# Patient Record
Sex: FEMALE | Race: WHITE | Employment: OTHER | URBAN - METROPOLITAN AREA
[De-identification: names, ages, dates, MRNs, and addresses within clinical notes are randomized per-mention and may not be internally consistent; named-entity substitution may affect disease eponyms.]

---

## 2017-01-03 ENCOUNTER — HOSPITAL ENCOUNTER (OUTPATIENT)
Dept: LAB | Age: 62
Discharge: HOME | End: 2017-01-03

## 2017-01-03 ENCOUNTER — HOSPITAL ENCOUNTER (OUTPATIENT)
Dept: CT IMAGING | Age: 62
Discharge: HOME | End: 2017-01-03
Attending: FAMILY MEDICINE
Payer: COMMERCIAL

## 2017-01-03 DIAGNOSIS — R05.9 COUGH: ICD-10-CM

## 2017-01-03 DIAGNOSIS — R06.00 DYSPNEA: ICD-10-CM

## 2017-01-03 DIAGNOSIS — I11.9 BENIGN HYPERTENSIVE HEART DISEASE WITHOUT HEART FAILURE: ICD-10-CM

## 2017-01-03 PROCEDURE — 71270 CT THORAX DX C-/C+: CPT

## 2017-01-03 PROCEDURE — 71260 CT THORAX DX C+: CPT

## 2017-01-03 PROCEDURE — 74011636320 HC RX REV CODE- 636/320: Performed by: FAMILY MEDICINE

## 2017-01-03 PROCEDURE — 99001 SPECIMEN HANDLING PT-LAB: CPT | Performed by: INTERNAL MEDICINE

## 2017-01-03 RX ADMIN — IOPAMIDOL 80 ML: 612 INJECTION, SOLUTION INTRAVENOUS at 07:47

## 2017-01-04 ENCOUNTER — CLINICAL SUPPORT (OUTPATIENT)
Dept: CARDIOLOGY CLINIC | Age: 62
End: 2017-01-04

## 2017-01-04 VITALS
BODY MASS INDEX: 37.74 KG/M2 | HEIGHT: 63 IN | OXYGEN SATURATION: 96 % | HEART RATE: 84 BPM | WEIGHT: 213 LBS | DIASTOLIC BLOOD PRESSURE: 73 MMHG | SYSTOLIC BLOOD PRESSURE: 129 MMHG

## 2017-01-04 DIAGNOSIS — I10 ESSENTIAL HYPERTENSION: Primary | ICD-10-CM

## 2017-01-04 NOTE — PROGRESS NOTES
Roge Nelson is a 59 y.o. female that is here for a blood pressure check. Her current medications are listed below. Current Outpatient Prescriptions   Medication Sig    lisinopril (PRINIVIL, ZESTRIL) 10 mg tablet Take 1 Tab by mouth daily.  hydrochlorothiazide (HYDRODIURIL) 25 mg tablet Take 1 Tab by mouth daily.  ATORVASTATIN CALCIUM (LIPITOR PO) Take 10 mg by mouth daily.  aspirin 81 mg chewable tablet Take 1 Tab by mouth daily.  buPROPion XL (WELLBUTRIN XL) 150 mg tablet Take 150 mg by mouth every morning.  CALCIUM PO Take 1,200 mg by mouth daily.  multivitamin, stress formula (STRESS TAB) tablet Take 1 Tab by mouth daily.  cholecalciferol (VITAMIN D3) 1,000 unit tablet Take  by mouth daily.  omega-3 fatty acids-vitamin e (FISH OIL) 1,000 mg cap Take 1 Cap by mouth.  Desvenlafaxine SR (PRISTIQ) 50 mg tablet Take 50 mg by mouth daily. Indications: GENERALIZED ANXIETY DISORDER     No current facility-administered medications for this visit. Her   Visit Vitals    /73    Pulse 84    Ht 5' 3\" (1.6 m)    Wt 213 lb (96.6 kg)    SpO2 96%    BMI 37.73 kg/m2       She took her lisinopril this morning at 7:30. She has no complaints and is doing well. She had her labs yesterday and they were reviewed. All within normal limits. Advised to continue same, no changes at this time per Dr. Argenis Garnica. Patient verbalized understanding.

## 2017-01-11 ENCOUNTER — CLINICAL SUPPORT (OUTPATIENT)
Dept: CARDIOLOGY CLINIC | Age: 62
End: 2017-01-11

## 2017-01-11 DIAGNOSIS — R00.1 SYMPTOMATIC BRADYCARDIA: Primary | ICD-10-CM

## 2017-01-11 DIAGNOSIS — Z95.0 CARDIAC PACEMAKER IN SITU: ICD-10-CM

## 2017-02-06 ENCOUNTER — HOSPITAL ENCOUNTER (OUTPATIENT)
Dept: CT IMAGING | Age: 62
Discharge: HOME | End: 2017-02-06
Attending: INTERNAL MEDICINE
Payer: COMMERCIAL

## 2017-02-06 DIAGNOSIS — J84.10 PULMONARY FIBROSIS, UNSPECIFIED (HCC): ICD-10-CM

## 2017-02-06 PROCEDURE — 71250 CT THORAX DX C-: CPT

## 2017-03-23 ENCOUNTER — HOSPITAL ENCOUNTER (OUTPATIENT)
Dept: NON INVASIVE DIAGNOSTICS | Age: 62
Discharge: HOME | End: 2017-03-23
Attending: PHYSICIAN ASSISTANT
Payer: COMMERCIAL

## 2017-03-23 DIAGNOSIS — R06.09 DOE (DYSPNEA ON EXERTION): ICD-10-CM

## 2017-03-23 PROCEDURE — 93306 TTE W/DOPPLER COMPLETE: CPT

## 2017-04-12 ENCOUNTER — OFFICE VISIT (OUTPATIENT)
Dept: CARDIOLOGY CLINIC | Age: 62
End: 2017-04-12

## 2017-04-12 DIAGNOSIS — I48.3 TYPICAL ATRIAL FLUTTER (HCC): Primary | ICD-10-CM

## 2017-04-12 DIAGNOSIS — R00.1 SYMPTOMATIC BRADYCARDIA: ICD-10-CM

## 2017-04-12 DIAGNOSIS — Z95.0 CARDIAC PACEMAKER IN SITU: ICD-10-CM

## 2017-06-20 ENCOUNTER — OFFICE VISIT (OUTPATIENT)
Dept: CARDIOLOGY CLINIC | Age: 62
End: 2017-06-20

## 2017-06-20 VITALS
OXYGEN SATURATION: 96 % | BODY MASS INDEX: 37.74 KG/M2 | DIASTOLIC BLOOD PRESSURE: 73 MMHG | SYSTOLIC BLOOD PRESSURE: 136 MMHG | HEIGHT: 63 IN | WEIGHT: 213 LBS | HEART RATE: 73 BPM

## 2017-06-20 DIAGNOSIS — I48.3 TYPICAL ATRIAL FLUTTER (HCC): Primary | ICD-10-CM

## 2017-06-20 RX ORDER — LOSARTAN POTASSIUM 25 MG/1
TABLET ORAL
Refills: 0 | COMMUNITY
Start: 2017-05-22

## 2017-06-20 RX ORDER — ATORVASTATIN CALCIUM 10 MG/1
TABLET, FILM COATED ORAL
Refills: 0 | COMMUNITY
Start: 2017-05-21 | End: 2020-01-15 | Stop reason: DRUGHIGH

## 2017-06-20 NOTE — LETTER
Patient:  Naomi Arias YOB: 1955 Date of Visit: 6/20/2017 Dear Alyssa Johnson., MD 
[address] [place] [address],[address] [place] [id number] VIA Facsimile: [phone] 
 : 
 
 
     [facility] HPI:  
 Ms. Madai Mayer is a 59 y.o. woman with hypertension. She has a history of atrial flutter with symptomatic bradycardia. She is status post atrial flutter ablation and permanent pacemaker implantation in March 2013. Ms. Madai Mayer is here today for follow up appointment. She used to be an established patient of Dr. Mota November. She is here to establish care with me today. She denies any symptoms to suggest angina or heart failure. She says she has been diagnosed with chronic bronchiolitis by Dr. Jc Boyle and she has been started on inhaler treatment. Since then her breathing has improved significantly. She can walk at least three blocks without any shortness of breath. She says that she does not have any symptoms to be concerned of angina. She denies any chest pain or chest tightness with resting or exertion. She denies any presyncope or syncope. She uses a CPAP machine for her sleep apnea. Past Medical History:  
Diagnosis Date  Atrial flutter CHADS score 1  (-CHF, +HTN, -AGE, -DM, -CVA)  Atrial flutter ablation 3/13  Dyslipidemia  Hypertension  Obstructive sleep apnea CPAP  
 Pacemaker 3/13 ([employer])  Symptomatic bradycardia Past Surgical History:  
Procedure Laterality Date  HX HYSTERECTOMY ? years ago  HX OOPHORECTOMY Current Outpatient Prescriptions Medication Sig  
 losartan (COZAAR) 25 mg tablet take 1 tablet by mouth once daily  atorvastatin (LIPITOR) 10 mg tablet  hydrochlorothiazide (HYDRODIURIL) 25 mg tablet Take 1 Tab by mouth daily.  aspirin 81 mg chewable tablet Take 1 Tab by mouth daily.  buPROPion XL (WELLBUTRIN XL) 150 mg tablet Take 150 mg by mouth every morning.  CALCIUM PO Take 1,200 mg by mouth daily.  multivitamin, stress formula (STRESS TAB) tablet Take 1 Tab by mouth daily.  cholecalciferol (VITAMIN D3) 1,000 unit tablet Take  by mouth daily.  omega-3 fatty acids-vitamin e (FISH OIL) 1,000 mg cap Take 1 Cap by mouth.  Desvenlafaxine SR (PRISTIQ) 50 mg tablet Take 50 mg by mouth daily. Indications: GENERALIZED ANXIETY DISORDER No current facility-administered medications for this visit. Allergies and Intolerances:  
No Known Allergies Family History:  
Non contributory for premature coronary disease in first degree relatives. Social History: She  reports that she has never smoked. She has never used smokeless tobacco.  She  reports that she drinks alcohol. Review of Systems: As above otherwise 11 point review of systems negative including constitutional, skin, HENT, eyes, respiratory, cardiovascular, gastrointestinal, genitourinary, musculoskeletal, endo/heme/aller, neurological. 
 
 
Physical:  
Vitals:  
BP: 136/73 HR: 73 Wt: 213 lb (96.6 kg) Exam:  
GENERAL:  Ms. Tyler Arboleda is a middle-aged woman without complaints. CHEST:  Clear with good air movement CARDIAC:  Regular. No murmurs ABDOMEN:  Bowel sounds present. EXTREMITIES:  No edema. CAROTID:  No bruit. NECK:  No JVD. Review of Data:  
LABS:  
Lab Results Component Value Date/Time WBC 6.5 03/06/2013 12:40 PM  
 HGB 12.7 03/06/2013 12:40 PM  
 HCT 39.7 03/06/2013 12:40 PM  
 PLATELET 484 50/51/2826 12:40 PM  
 
Lab Results Component Value Date/Time  Sodium 138 01/03/2017 12:00 AM  
 Potassium 4.3 01/03/2017 12:00 AM  
 Chloride 98 01/03/2017 12:00 AM  
 CO2 21 01/03/2017 12:00 AM  
 Anion gap 5 02/05/2014 03:42 PM  
 Glucose 105 01/03/2017 12:00 AM  
 BUN 22 01/03/2017 12:00 AM  
 Creatinine 0.74 01/03/2017 12:00 AM  
 BUN/Creatinine ratio 30 01/03/2017 12:00 AM  
 GFR est  01/03/2017 12:00 AM  
 GFR est non-AA 86 01/03/2017 12:00 AM  
 Calcium 9.6 01/03/2017 12:00 AM  
 Bilirubin, total 0.3 03/06/2013 12:40 PM  
 AST (SGOT) 24 03/06/2013 12:40 PM  
 Alk. phosphatase 87 03/06/2013 12:40 PM  
 Protein, total 6.9 03/06/2013 12:40 PM  
 Albumin 3.2 03/06/2013 12:40 PM  
 Globulin 3.7 03/06/2013 12:40 PM  
 A-G Ratio 0.9 03/06/2013 12:40 PM  
 ALT (SGPT) 23 03/06/2013 12:40 PM  
 
Lab Results Component Value Date/Time Cholesterol, total 222 07/12/2010 08:49 AM  
 HDL Cholesterol 51 07/12/2010 08:49 AM  
 LDL, calculated 150 07/12/2010 08:49 AM  
 Triglyceride 105 07/12/2010 08:49 AM  
 
No results found for: HBA1C, [id number] December 2015: Total cholesterol 156, triglycerides 60, HDL 53, LDL 80. EKG: June 2016: 
100% paced 81 bpm. 
 
TRANSTHORACIC ECHO: January 2013: 
LEFT VENTRICLE: Size was normal. Systolic function was normal. Ejection fraction was estimated in the range of 55 % to 60 %. No obvious wall motion abnormalities identified in the views obtained. Wall thickness was normal. DOPPLER: The study was not technically sufficient to allow evaluation of LV diastolic function. RIGHT VENTRICLE: The size was normal. Wall thickness was normal. DOPPLER: The tricuspid jet envelope definition was inadequate for estimation of RV systolic pressure. There are no indirect findings (abnormal RV volume or geometry, altered pulmonary flow velocity profile, or leftward septal displacement) which would suggest moderate or severe pulmonary hypertension. LEFT ATRIUM: Size was normal. 
 
RIGHT ATRIUM: Size was normal. 
 
MITRAL VALVE: There was normal leaflet separation. DOPPLER: There was no evidence for stenosis. There was trivial regurgitation. AORTIC VALVE: The valve was trileaflet. Leaflets exhibited sclerosis. DOPPLER: There was no stenosis. There was no regurgitation. TRICUSPID VALVE: There was normal leaflet separation. DOPPLER: There was no evidence for tricuspid stenosis. There was trivial regurgitation. PULMONIC VALVE: Not well visualized, but normal Doppler findings. AORTA: The root exhibited normal size. PERICARDIUM: No significant pericardial effusion identified. TRANSESOPHAGEAL ECHO: March 2013: 
LEFT VENTRICLE: The ventricle was dilated. Systolic function was normal. Ejection fraction was estimated in the range of 55 % to 60 %. There were no regional wall motion abnormalities. RIGHT VENTRICLE: The size was normal. Systolic function was normal. DOPPLER: Systolic pressure was at the upper limits of normal. Estimated peak pressure was 35 mmHg. LEFT ATRIUM: The atrium was dilated. No thrombus was identified. APPENDAGE: The size was normal. No thrombus was identified. DOPPLER: The function was normal (normal emptying velocity). ATRIAL SEPTUM: No defect or patent foramen ovale was identified. Contrast injection was performed. There was no right-to-left shunt, with provocative maneuvers to increase right atrial pressure. RIGHT ATRIUM: Size was normal. No thrombus was identified. MITRAL VALVE: Normal valve structure. There was normal leaflet separation. No obvious mass, vegetation or thrombus noted. DOPPLER: There was no evidence for stenosis. There was trivial regurgitation. AORTIC VALVE: The valve was trileaflet. Leaflets exhibited normal thickness and normal cuspal separation. No obvious mass, vegetation or thrombus noted. DOPPLER: There was no stenosis. There was no regurgitation. TRICUSPID VALVE: Normal valve structure. There was normal leaflet separation. No obvious mass, vegetation or thrombus noted. DOPPLER: There was mild regurgitation. PULMONIC VALVE: Leaflets exhibited normal thickness, no calcification, and normal cuspal separation. No obvious mass, vegetation or thrombus noted. DOPPLER: There was no regurgitation. AORTA: The root exhibited normal size. There was no atheroma. There was no evidence for dissection. There was no evidence for aneurysm. PERICARDIUM: There was no pericardial effusion. STRESS TEST (EST, PHARM, NUC, ECHO etc): January 2013: 
1. Technically difficult study. 2. Anterior perfusion defect consistent with soft tissue attenuation defect. 3. No regional wall motion abnormalities. 4. Normal systolic functioning. Ejection fraction 60%. CATHETERIZATION:  
 
 
Impression / Plan:   
 Hypertension  Dyslipidemia  Atrial flutter  Symptomatic bradycardia HTN: 
BP today in office 136/76 mm Hg Continue HCTZ and Losartan. Stable HLD: 
Currently on atorvastatin 10 mg daily. Being managed by PCP regularly per patient. Will defer to PCP A. Flutter: She underwent flutter ablation in March, 2013. Today she does not report any recurrent awareness of palpitations. She is being paced. EKG confirmed that. Otherwise her CHADS score remains calculated at 1. She is anticoagulated with aspirin. Other than the above, or unless any additional issues arise, I have asked that she follow up in six months. Sincerely, Dontrell Gregory MD

## 2017-06-20 NOTE — PROGRESS NOTES
1. Have you been to the ER, urgent care clinic since your last visit? Hospitalized since your last visit? No    2. Have you seen or consulted any other health care providers outside of the [address] [place] [address] since your last visit? Include any pap smears or colon screening.  No

## 2017-06-20 NOTE — MR AVS SNAPSHOT
Visit Information Date & Time Provider Department Dept. Phone Encounter #  
 6/20/2017  8:30 AM Julio Vega MD Cardio Specialist at [facility] [phone] [id number] Follow-up Instructions Return in about 9 months (around 3/20/2018). Your Appointments 7/12/2017  4:00 PM  
CARELINK with Pacer  [facility] ([facility]) Appt Note: 3 month f/u after April -[facility] [address] [address] [address] [phone] Danilo Kan  
  
    
 10/11/2017  4:00 PM  
CARELINK with Pacebrittni  [facility] [address] ([facility]) Appt Note: 3 month f/u after July -[facility] [address] [address] [address] [phone] Upcoming Health Maintenance Date Due Hepatitis C Screening 1955 DTaP/Tdap/Td series (1 - Tdap) 12/25/1974 FOBT Q 1 YEAR AGE 50-75 12/25/2003 ZOSTER VACCINE AGE 60> 12/25/2013 PAP AKA CERVICAL CYTOLOGY 3/21/2015 INFLUENZA AGE 9 TO ADULT 8/1/2017 BREAST CANCER SCRN MAMMOGRAM 12/28/2018 Allergies as of 6/20/2017  Review Complete On: 6/20/2017 By: Zelda Sahu LPN No Known Allergies Current Immunizations  Never Reviewed No immunizations on file. Not reviewed this visit You Were Diagnosed With   
  
 Codes Comments Typical atrial flutter (HCC)    -  Primary ICD-10-CM: I48.3 ICD-9-CM: 427.32 Vitals BP Pulse Height(growth percentile) Weight(growth percentile) SpO2 BMI  
 136/73 73 5' 3\" (1.6 m) 213 lb (96.6 kg) 96% 37.73 kg/m2 OB Status Smoking Status Hysterectomy Never Smoker BMI and BSA Data Body Mass Index Body Surface Area  
 37.73 kg/m 2 2.07 m 2 Preferred Pharmacy Pharmacy Name Phone [facility] [phone]  [facility] [phone] Your Updated Medication List  
  
   
This list is accurate as of: 6/20/17  8:49 AM.  Always use your most recent med list.  
  
  
  
  
 aspirin 81 mg chewable tablet Take 1 Tab by mouth daily. atorvastatin 10 mg tablet Commonly known as:  LIPITOR  
  
 CALCIUM PO Take 1,200 mg by mouth daily. FISH OIL 1,000 mg Cap Generic drug:  omega-3 fatty acids-vitamin e Take 1 Cap by mouth. hydroCHLOROthiazide 25 mg tablet Commonly known as:  HYDRODIURIL Take 1 Tab by mouth daily. losartan 25 mg tablet Commonly known as:  COZAAR  
take 1 tablet by mouth once daily  
  
 multivitamin, stress formula tablet Commonly known as:  STRESS TAB Take 1 Tab by mouth daily. PRISTIQ 50 mg tablet Generic drug:  Desvenlafaxine SR Take 50 mg by mouth daily. Indications: GENERALIZED ANXIETY DISORDER  
  
 VITAMIN D3 1,000 unit tablet Generic drug:  cholecalciferol Take  by mouth daily. WELLBUTRIN  mg tablet Generic drug:  buPROPion XL Take 150 mg by mouth every morning. We Performed the Following AMB POC EKG ROUTINE W/ 12 LEADS, INTER & REP [11466 CPT(R)] Follow-up Instructions Return in about 9 months (around 3/20/2018). Introducing [facility]! Dear Jamel Quinones: Thank you for requesting a [facility] account. Our records indicate that you already have an active [facility] [facility] account. You can access your account anytime at [email or web address] Did you know that you can access your hospital and ER discharge instructions at any time in [facility] [facility]? You can also review all of your test results from your hospital stay or ER visit. Additional Information If you have questions, please visit the Frequently Asked Questions section of the [employer] [facility] website at [email or web address] Remember, [facility] [facility] is NOT to be used for urgent needs. For medical emergencies, dial 911. Now available from your iPhone and Android! Please provide this summary of care documentation to your next provider. Your primary care clinician is listed as CATHY Perez. If you have any questions after today's visit, please call [phone].

## 2017-06-20 NOTE — PROGRESS NOTES
[facility]    HPI:    Ms. Fani Grace is a 59 y.o. woman with hypertension. She has a history of atrial flutter with symptomatic bradycardia. She is status post atrial flutter ablation and permanent pacemaker implantation in March 2013. Ms. Fani Grace is here today for follow up appointment. She used to be an established patient of Dr. Funmi Mosley. She is here to establish care with me today. She denies any symptoms to suggest angina or heart failure. She says she has been diagnosed with chronic bronchiolitis by Dr. Ralph Walker and she has been started on inhaler treatment. Since then her breathing has improved significantly. She can walk at least three blocks without any shortness of breath. She says that she does not have any symptoms to be concerned of angina. She denies any chest pain or chest tightness with resting or exertion. She denies any presyncope or syncope. She uses a CPAP machine for her sleep apnea. Past Medical History:   Diagnosis Date    Atrial flutter     CHADS score 1  (-CHF, +HTN, -AGE, -DM, -CVA)    Atrial flutter ablation     3/13    Dyslipidemia     Hypertension     Obstructive sleep apnea     CPAP    Pacemaker     3/13 ([employer])    Symptomatic bradycardia        Past Surgical History:   Procedure Laterality Date    HX HYSTERECTOMY      ? years ago    HX OOPHORECTOMY         Current Outpatient Prescriptions   Medication Sig    losartan (COZAAR) 25 mg tablet take 1 tablet by mouth once daily    atorvastatin (LIPITOR) 10 mg tablet     hydrochlorothiazide (HYDRODIURIL) 25 mg tablet Take 1 Tab by mouth daily.  aspirin 81 mg chewable tablet Take 1 Tab by mouth daily.  buPROPion XL (WELLBUTRIN XL) 150 mg tablet Take 150 mg by mouth every morning.  CALCIUM PO Take 1,200 mg by mouth daily.  multivitamin, stress formula (STRESS TAB) tablet Take 1 Tab by mouth daily.  cholecalciferol (VITAMIN D3) 1,000 unit tablet Take  by mouth daily.       omega-3 fatty acids-vitamin e (FISH OIL) 1,000 mg cap Take 1 Cap by mouth.  Desvenlafaxine SR (PRISTIQ) 50 mg tablet Take 50 mg by mouth daily. Indications: GENERALIZED ANXIETY DISORDER     No current facility-administered medications for this visit. Allergies and Intolerances:   No Known Allergies      Family History:   Non contributory for premature coronary disease in first degree relatives. Social History:   She  reports that she has never smoked. She has never used smokeless tobacco.  She  reports that she drinks alcohol. Review of Systems:   As above otherwise 11 point review of systems negative including constitutional, skin, HENT, eyes, respiratory, cardiovascular, gastrointestinal, genitourinary, musculoskeletal, endo/heme/aller, neurological.      Physical:   Vitals:   BP: 136/73  HR: 73  Wt: 213 lb (96.6 kg)    Exam:   GENERAL:  Ms. Arabella Palacios is a middle-aged woman without complaints. CHEST:  Clear with good air movement  CARDIAC:  Regular. No murmurs  ABDOMEN:  Bowel sounds present. EXTREMITIES:  No edema. CAROTID:  No bruit. NECK:  No JVD. Review of Data:   LABS:   Lab Results   Component Value Date/Time    WBC 6.5 03/06/2013 12:40 PM    HGB 12.7 03/06/2013 12:40 PM    HCT 39.7 03/06/2013 12:40 PM    PLATELET 316 57/52/5482 12:40 PM     Lab Results   Component Value Date/Time    Sodium 138 01/03/2017 12:00 AM    Potassium 4.3 01/03/2017 12:00 AM    Chloride 98 01/03/2017 12:00 AM    CO2 21 01/03/2017 12:00 AM    Anion gap 5 02/05/2014 03:42 PM    Glucose 105 01/03/2017 12:00 AM    BUN 22 01/03/2017 12:00 AM    Creatinine 0.74 01/03/2017 12:00 AM    BUN/Creatinine ratio 30 01/03/2017 12:00 AM    GFR est  01/03/2017 12:00 AM    GFR est non-AA 86 01/03/2017 12:00 AM    Calcium 9.6 01/03/2017 12:00 AM    Bilirubin, total 0.3 03/06/2013 12:40 PM    AST (SGOT) 24 03/06/2013 12:40 PM    Alk.  phosphatase 87 03/06/2013 12:40 PM    Protein, total 6.9 03/06/2013 12:40 PM    Albumin 3.2 03/06/2013 12:40 PM    Globulin 3.7 03/06/2013 12:40 PM    A-G Ratio 0.9 03/06/2013 12:40 PM    ALT (SGPT) 23 03/06/2013 12:40 PM     Lab Results   Component Value Date/Time    Cholesterol, total 222 07/12/2010 08:49 AM    HDL Cholesterol 51 07/12/2010 08:49 AM    LDL, calculated 150 07/12/2010 08:49 AM    Triglyceride 105 07/12/2010 08:49 AM     No results found for: HBA1C, NST1PSFH    December 2015: Total cholesterol 156, triglycerides 60, HDL 53, LDL 80. EKG: June 2016:  100% paced 81 bpm.    TRANSTHORACIC ECHO: January 2013:  LEFT VENTRICLE: Size was normal. Systolic function was normal. Ejection fraction was estimated in the range of 55 % to 60 %. No obvious wall motion abnormalities identified in the views obtained. Wall thickness was normal. DOPPLER: The study was not technically sufficient to allow evaluation of LV diastolic function. RIGHT VENTRICLE: The size was normal. Wall thickness was normal. DOPPLER: The tricuspid jet envelope definition was inadequate for estimation of RV systolic pressure. There are no indirect findings (abnormal RV volume or geometry, altered pulmonary flow velocity profile, or leftward septal displacement) which would suggest moderate or severe pulmonary hypertension. LEFT ATRIUM: Size was normal.    RIGHT ATRIUM: Size was normal.    MITRAL VALVE: There was normal leaflet separation. DOPPLER: There was no evidence for stenosis. There was trivial regurgitation. AORTIC VALVE: The valve was trileaflet. Leaflets exhibited sclerosis. DOPPLER: There was no stenosis. There was no regurgitation. TRICUSPID VALVE: There was normal leaflet separation. DOPPLER: There was no evidence for tricuspid stenosis. There was trivial regurgitation. PULMONIC VALVE: Not well visualized, but normal Doppler findings. AORTA: The root exhibited normal size. PERICARDIUM: No significant pericardial effusion identified.     TRANSESOPHAGEAL ECHO: March 2013:  LEFT VENTRICLE: The ventricle was dilated. Systolic function was normal. Ejection fraction was estimated in the range of 55 % to 60 %. There were no regional wall motion abnormalities. RIGHT VENTRICLE: The size was normal. Systolic function was normal. DOPPLER: Systolic pressure was at the upper limits of normal. Estimated peak pressure was 35 mmHg. LEFT ATRIUM: The atrium was dilated. No thrombus was identified. APPENDAGE: The size was normal. No thrombus was identified. DOPPLER: The function was normal (normal emptying velocity). ATRIAL SEPTUM: No defect or patent foramen ovale was identified. Contrast injection was performed. There was no right-to-left shunt, with provocative maneuvers to increase right atrial pressure. RIGHT ATRIUM: Size was normal. No thrombus was identified. MITRAL VALVE: Normal valve structure. There was normal leaflet separation. No obvious mass, vegetation or thrombus noted. DOPPLER: There was no evidence for stenosis. There was trivial regurgitation. AORTIC VALVE: The valve was trileaflet. Leaflets exhibited normal thickness and normal cuspal separation. No obvious mass, vegetation or thrombus noted. DOPPLER: There was no stenosis. There was no regurgitation. TRICUSPID VALVE: Normal valve structure. There was normal leaflet separation. No obvious mass, vegetation or thrombus noted. DOPPLER: There was mild regurgitation. PULMONIC VALVE: Leaflets exhibited normal thickness, no calcification, and normal cuspal separation. No obvious mass, vegetation or thrombus noted. DOPPLER: There was no regurgitation. AORTA: The root exhibited normal size. There was no atheroma. There was no evidence for dissection. There was no evidence for aneurysm. PERICARDIUM: There was no pericardial effusion. STRESS TEST (EST, PHARM, NUC, ECHO etc): January 2013:  1. Technically difficult study. 2. Anterior perfusion defect consistent with soft tissue attenuation defect.   3. No regional wall motion abnormalities. 4. Normal systolic functioning. Ejection fraction 60%. CATHETERIZATION:       Impression / Plan:     Hypertension    Dyslipidemia    Atrial flutter    Symptomatic bradycardia       HTN:  BP today in office 136/76 mm Hg  Continue HCTZ and Losartan. Stable    HLD:  Currently on atorvastatin 10 mg daily. Being managed by PCP regularly per patient. Will defer to PCP    A. Flutter:  She underwent flutter ablation in March, 2013. Today she does not report any recurrent awareness of palpitations. She is being paced. EKG confirmed that. Otherwise her CHADS score remains calculated at 1. She is anticoagulated with aspirin. Other than the above, or unless any additional issues arise, I have asked that she follow up in six months.

## 2017-07-18 ENCOUNTER — APPOINTMENT (OUTPATIENT)
Dept: GENERAL RADIOLOGY | Age: 62
End: 2017-07-18
Attending: EMERGENCY MEDICINE
Payer: COMMERCIAL

## 2017-07-18 ENCOUNTER — APPOINTMENT (OUTPATIENT)
Dept: NUCLEAR MEDICINE | Age: 62
End: 2017-07-18
Attending: EMERGENCY MEDICINE
Payer: COMMERCIAL

## 2017-07-18 ENCOUNTER — HOSPITAL ENCOUNTER (OUTPATIENT)
Age: 62
Setting detail: OBSERVATION
Discharge: HOME | End: 2017-07-21
Attending: EMERGENCY MEDICINE | Admitting: FAMILY MEDICINE
Payer: COMMERCIAL

## 2017-07-18 DIAGNOSIS — R07.9 ACUTE CHEST PAIN: ICD-10-CM

## 2017-07-18 DIAGNOSIS — I50.31 ACUTE DIASTOLIC CONGESTIVE HEART FAILURE (HCC): Primary | ICD-10-CM

## 2017-07-18 LAB
ALBUMIN SERPL BCP-MCNC: 3.2 G/DL (ref 3.4–5)
ALBUMIN/GLOB SERPL: 0.8 {RATIO} (ref 0.8–1.7)
ALP SERPL-CCNC: 87 U/L (ref 45–117)
ALT SERPL-CCNC: 26 U/L (ref 13–56)
ANION GAP BLD CALC-SCNC: 10 MMOL/L (ref 3–18)
AST SERPL W P-5'-P-CCNC: 20 U/L (ref 15–37)
BASOPHILS # BLD AUTO: 0 K/UL (ref 0–0.06)
BASOPHILS # BLD: 0 % (ref 0–2)
BILIRUB SERPL-MCNC: 1 MG/DL (ref 0.2–1)
BNP SERPL-MCNC: 5308 PG/ML (ref 0–900)
BUN SERPL-MCNC: 11 MG/DL (ref 7–18)
BUN/CREAT SERPL: 14 (ref 12–20)
CALCIUM SERPL-MCNC: 9.6 MG/DL (ref 8.5–10.1)
CHLORIDE SERPL-SCNC: 106 MMOL/L (ref 100–108)
CO2 SERPL-SCNC: 24 MMOL/L (ref 21–32)
CREAT SERPL-MCNC: 0.78 MG/DL (ref 0.6–1.3)
D DIMER PPP FEU-MCNC: 0.58 UG/ML(FEU)
DIFFERENTIAL METHOD BLD: ABNORMAL
EOSINOPHIL # BLD: 0.1 K/UL (ref 0–0.4)
EOSINOPHIL NFR BLD: 1 % (ref 0–5)
ERYTHROCYTE [DISTWIDTH] IN BLOOD BY AUTOMATED COUNT: 13.8 % (ref 11.6–14.5)
GLOBULIN SER CALC-MCNC: 4.1 G/DL (ref 2–4)
GLUCOSE SERPL-MCNC: 116 MG/DL (ref 74–99)
HCT VFR BLD AUTO: 39.2 % (ref 35–45)
HGB BLD-MCNC: 13.4 G/DL (ref 12–16)
LIPASE SERPL-CCNC: 217 U/L (ref 73–393)
LYMPHOCYTES # BLD AUTO: 10 % (ref 21–52)
LYMPHOCYTES # BLD: 1.3 K/UL (ref 0.9–3.6)
MCH RBC QN AUTO: 31.2 PG (ref 24–34)
MCHC RBC AUTO-ENTMCNC: 34.2 G/DL (ref 31–37)
MCV RBC AUTO: 91.2 FL (ref 74–97)
MONOCYTES # BLD: 1.2 K/UL (ref 0.05–1.2)
MONOCYTES NFR BLD AUTO: 9 % (ref 3–10)
NEUTS SEG # BLD: 10.4 K/UL (ref 1.8–8)
NEUTS SEG NFR BLD AUTO: 80 % (ref 40–73)
PLATELET # BLD AUTO: 162 K/UL (ref 135–420)
PMV BLD AUTO: 11.6 FL (ref 9.2–11.8)
POTASSIUM SERPL-SCNC: 4 MMOL/L (ref 3.5–5.5)
PROT SERPL-MCNC: 7.3 G/DL (ref 6.4–8.2)
RBC # BLD AUTO: 4.3 M/UL (ref 4.2–5.3)
SODIUM SERPL-SCNC: 140 MMOL/L (ref 136–145)
TROPONIN I SERPL-MCNC: 0.03 NG/ML (ref 0–0.04)
WBC # BLD AUTO: 13 K/UL (ref 4.6–13.2)

## 2017-07-18 PROCEDURE — 94761 N-INVAS EAR/PLS OXIMETRY MLT: CPT

## 2017-07-18 PROCEDURE — 99285 EMERGENCY DEPT VISIT HI MDM: CPT

## 2017-07-18 PROCEDURE — 93005 ELECTROCARDIOGRAM TRACING: CPT

## 2017-07-18 PROCEDURE — 74011250637 HC RX REV CODE- 250/637: Performed by: EMERGENCY MEDICINE

## 2017-07-18 PROCEDURE — 71010 XR CHEST PORT: CPT

## 2017-07-18 PROCEDURE — 96376 TX/PRO/DX INJ SAME DRUG ADON: CPT

## 2017-07-18 PROCEDURE — 74011250636 HC RX REV CODE- 250/636: Performed by: EMERGENCY MEDICINE

## 2017-07-18 PROCEDURE — 83690 ASSAY OF LIPASE: CPT | Performed by: EMERGENCY MEDICINE

## 2017-07-18 PROCEDURE — 80053 COMPREHEN METABOLIC PANEL: CPT | Performed by: EMERGENCY MEDICINE

## 2017-07-18 PROCEDURE — 85025 COMPLETE CBC W/AUTO DIFF WBC: CPT | Performed by: EMERGENCY MEDICINE

## 2017-07-18 PROCEDURE — 74011000250 HC RX REV CODE- 250: Performed by: EMERGENCY MEDICINE

## 2017-07-18 PROCEDURE — 83880 ASSAY OF NATRIURETIC PEPTIDE: CPT | Performed by: EMERGENCY MEDICINE

## 2017-07-18 PROCEDURE — 96375 TX/PRO/DX INJ NEW DRUG ADDON: CPT

## 2017-07-18 PROCEDURE — 85379 FIBRIN DEGRADATION QUANT: CPT | Performed by: EMERGENCY MEDICINE

## 2017-07-18 PROCEDURE — 77030029684 HC NEB SM VOL KT MONA -A

## 2017-07-18 PROCEDURE — 96374 THER/PROPH/DIAG INJ IV PUSH: CPT

## 2017-07-18 PROCEDURE — 94640 AIRWAY INHALATION TREATMENT: CPT

## 2017-07-18 PROCEDURE — 84484 ASSAY OF TROPONIN QUANT: CPT | Performed by: EMERGENCY MEDICINE

## 2017-07-18 PROCEDURE — A9567 TECHNETIUM TC-99M AEROSOL: HCPCS

## 2017-07-18 RX ORDER — MORPHINE SULFATE 4 MG/ML
4 INJECTION, SOLUTION INTRAMUSCULAR; INTRAVENOUS
Status: COMPLETED | OUTPATIENT
Start: 2017-07-18 | End: 2017-07-18

## 2017-07-18 RX ORDER — FUROSEMIDE 10 MG/ML
40 INJECTION INTRAMUSCULAR; INTRAVENOUS
Status: COMPLETED | OUTPATIENT
Start: 2017-07-18 | End: 2017-07-18

## 2017-07-18 RX ORDER — MORPHINE SULFATE 2 MG/ML
2 INJECTION, SOLUTION INTRAMUSCULAR; INTRAVENOUS
Status: COMPLETED | OUTPATIENT
Start: 2017-07-18 | End: 2017-07-18

## 2017-07-18 RX ORDER — ONDANSETRON 2 MG/ML
4 INJECTION INTRAMUSCULAR; INTRAVENOUS
Status: COMPLETED | OUTPATIENT
Start: 2017-07-18 | End: 2017-07-18

## 2017-07-18 RX ORDER — FAMOTIDINE 10 MG/ML
20 INJECTION INTRAVENOUS
Status: COMPLETED | OUTPATIENT
Start: 2017-07-18 | End: 2017-07-18

## 2017-07-18 RX ORDER — IPRATROPIUM BROMIDE AND ALBUTEROL SULFATE 2.5; .5 MG/3ML; MG/3ML
3 SOLUTION RESPIRATORY (INHALATION)
Status: COMPLETED | OUTPATIENT
Start: 2017-07-18 | End: 2017-07-18

## 2017-07-18 RX ADMIN — Medication 4 MG: at 23:27

## 2017-07-18 RX ADMIN — IPRATROPIUM BROMIDE AND ALBUTEROL SULFATE 3 ML: .5; 3 SOLUTION RESPIRATORY (INHALATION) at 21:51

## 2017-07-18 RX ADMIN — FAMOTIDINE 20 MG: 10 INJECTION INTRAVENOUS at 21:50

## 2017-07-18 RX ADMIN — ONDANSETRON 4 MG: 2 INJECTION INTRAMUSCULAR; INTRAVENOUS at 21:45

## 2017-07-18 RX ADMIN — NITROGLYCERIN 1 INCH: 20 OINTMENT TOPICAL at 22:21

## 2017-07-18 RX ADMIN — FUROSEMIDE 40 MG: 10 INJECTION, SOLUTION INTRAMUSCULAR; INTRAVENOUS at 21:48

## 2017-07-18 RX ADMIN — MORPHINE SULFATE 2 MG: 2 INJECTION, SOLUTION INTRAMUSCULAR; INTRAVENOUS at 21:46

## 2017-07-18 NOTE — IP AVS SNAPSHOT
[address] [address] 
[phone] Patient: Lenny Palmer MRN: [id number] Current Discharge Medication List  
  
START taking these medications Dose & Instructions Dispensing Information Comments Morning Noon Evening Bedtime  
 bumetanide 1 mg tablet Commonly known as:  Seven Tubbs Your next dose is:  Tomorrow Dose:  1 mg Take 1 Tab by mouth daily. Quantity:  30 Tab Refills:  0  
     
  
   
   
   
  
 carvedilol 6.25 mg tablet Commonly known as:  Jessica Alstrom Your next dose is: Tonight Dose:  6.25 mg Take 1 Tab by mouth two (2) times daily (with meals). Quantity:  60 Tab Refills:  0 CONTINUE these medications which have NOT CHANGED Dose & Instructions Dispensing Information Comments Morning Noon Evening Bedtime  
 aspirin 81 mg chewable tablet Your next dose is:  Tomorrow Dose:  81 mg Take 1 Tab by mouth daily. Quantity:  90 Tab Refills:  3  
     
  
   
   
   
  
 atorvastatin 10 mg tablet Commonly known as:  LIPITOR Your next dose is:  Tomorrow Refills:  0  
     
  
   
   
   
  
 CALCIUM PO Your next dose is:  Tomorrow Dose:  1200 mg Take 1,200 mg by mouth daily. Refills:  0  
     
  
   
   
   
  
 FISH OIL 1,000 mg Cap Generic drug:  omega-3 fatty acids-vitamin e Your next dose is:  Tomorrow Dose:  1 Cap Take 1 Cap by mouth. Refills:  0  
     
  
   
   
   
  
 losartan 25 mg tablet Commonly known as:  COZAAR Your next dose is:  Tomorrow 
 
  
   
 take 1 tablet by mouth once daily Refills:  0  
     
  
   
   
   
  
 multivitamin, stress formula tablet Commonly known as:  STRESS TAB Your next dose is:  Tomorrow Dose:  1 Tab Take 1 Tab by mouth daily. Refills:  0 PRISTIQ 50 mg ER tablet Generic drug:  desvenlafaxine succinate Your next dose is:  Tomorrow Dose:  50 mg Take 50 mg by mouth daily. Indications: GENERALIZED ANXIETY DISORDER Refills:  0  
     
  
   
   
   
  
 VITAMIN D3 1,000 unit tablet Generic drug:  cholecalciferol Your next dose is:  Tomorrow Take  by mouth daily. Refills:  0 WELLBUTRIN  mg tablet Generic drug:  buPROPion XL Your next dose is:  Tomorrow Dose:  150 mg Take 150 mg by mouth every morning. Refills:  0 STOP taking these medications   
 hydroCHLOROthiazide 25 mg tablet Commonly known as:  HYDRODIURIL Where to Get Your Medications [facility] [address]  [address], [address] [facility] [address] Phone:  [phone]  
  bumetanide 1 mg tablet  
 carvedilol 6.25 mg tablet

## 2017-07-18 NOTE — IP AVS SNAPSHOT
Juanpablo Latham 
 
 
 [address] [place] [address] 
[phone] Patient: Marcela Chávez MRN: [id number] DIMITRY:[id number] You are allergic to the following No active allergies Recent Documentation Height Weight Breastfeeding? BMI OB Status Smoking Status 1.575 m 95.5 kg No 38.5 kg/m2 Hysterectomy Never Smoker Emergency Contacts Name Discharge Info Relation Home Work Mobile [address] CAREGIVER [3] Spouse [3] [phone] About your hospitalization You were admitted on:  July 19, 2017 You last [facility]  [address] [place] [place] [address] on:  July 21, 2017 Unit phone number:  [phone] Why you were hospitalized Your primary diagnosis was:  Not on File Your diagnoses also included:  Chest Pain, Acute On Chronic Systolic Congestive Heart Failure (Hcc), Bronchiolitis, Dyslipidemia, Cardiac Pacemaker In Situ, Benign Hypertensive Heart Disease Without Heart Failure, Atrial Flutter (Hcc) Providers Seen During Your Hospitalizations Provider Role Specialty Primary office phone Rajan Angel MD Attending Provider Emergency Medicine [phone] Vonda Correa MD Attending Provider [facility] [phone] Mary Henley MD Attending Provider Internal Medicine [phone] Roxana Pitts MD Attending Provider Internal Medicine [phone] Your Primary Care Physician (PCP) Primary Care Physician Office Phone Office Fax Nathalie Huynh [phone] Follow-up Information Follow up With Details Comments Contact Info Javier Hernandez MD Schedule an appointment as soon as possible for a visit in 1 week For follow-up [address] [place] [phone] 
[phone]  Candelaria Westfall MD Schedule an appointment as soon as possible for a visit in 2 weeks For follow-up [id number] [facility] [address] [facility] [place] [phone] Current Discharge Medication List  
  
START taking these medications Dose & Instructions Dispensing Information Comments Morning Noon Evening Bedtime  
 bumetanide 1 mg tablet Commonly known as:  Ana María [place] Your next dose is:  Tomorrow Dose:  1 mg Take 1 Tab by mouth daily. Quantity:  30 Tab Refills:  0  
     
  
   
   
   
  
 carvedilol 6.25 mg tablet Commonly known as:  Wendall [place] Your next dose is: Tonight Dose:  6.25 mg Take 1 Tab by mouth two (2) times daily (with meals). Quantity:  60 Tab Refills:  0 CONTINUE these medications which have NOT CHANGED Dose & Instructions Dispensing Information Comments Morning Noon Evening Bedtime  
 aspirin 81 mg chewable tablet Your next dose is:  Tomorrow Dose:  81 mg Take 1 Tab by mouth daily. Quantity:  90 Tab Refills:  3  
     
  
   
   
   
  
 atorvastatin 10 mg tablet Commonly known as:  LIPITOR Your next dose is:  Tomorrow Refills:  0  
     
  
   
   
   
  
 CALCIUM PO Your next dose is:  Tomorrow Dose:  1200 mg Take 1,200 mg by mouth daily. Refills:  0  
     
  
   
   
   
  
 FISH OIL 1,000 mg Cap Generic drug:  omega-3 fatty acids-vitamin e Your next dose is:  Tomorrow Dose:  1 Cap Take 1 Cap by mouth. Refills:  0  
     
  
   
   
   
  
 losartan 25 mg tablet Commonly known as:  COZAAR Your next dose is:  Tomorrow 
 
  
   
 take 1 tablet by mouth once daily Refills:  0  
     
  
   
   
   
  
 multivitamin, stress formula tablet Commonly known as:  STRESS TAB Your next dose is:  Tomorrow Dose:  1 Tab Take 1 Tab by mouth daily. Refills:  0 PRISTIQ 50 mg ER tablet Generic drug:  desvenlafaxine succinate Your next dose is:  Tomorrow Dose:  50 mg Take 50 mg by mouth daily. Indications: GENERALIZED ANXIETY DISORDER Refills:  0  
     
  
   
   
   
  
 VITAMIN D3 1,000 unit tablet Generic drug:  cholecalciferol Your next dose is:  Tomorrow Take  by mouth daily. Refills:  0 WELLBUTRIN  mg tablet Generic drug:  buPROPion XL Your next dose is:  Tomorrow Dose:  150 mg Take 150 mg by mouth every morning. Refills:  0 STOP taking these medications   
 hydroCHLOROthiazide 25 mg tablet Commonly known as:  HYDRODIURIL Where to Get Your Medications [facility] [address]  [facility] [address] [place] [address] Phone:  [phone]  
  bumetanide 1 mg tablet  
 carvedilol 6.25 mg tablet Discharge Instructions DISCHARGE SUMMARY from Nurse The following personal items are in your possession at time of discharge: 
 
Dental Appliances: None Visual Aid: Glasses, With patient Home Medications: None Jewelry: Genell Milks Clothing: Pants, Socks, Shirt Other Valuables: None PATIENT INSTRUCTIONS: 
 
 
What to do at Home: 
Recommended activity: Activity as tolerated, If you experience any of the following symptoms chest pain, nausea, vomiting, please follow up with PCP. *  Please give a list of your current medications to your Primary Care Provider. *  Please update this list whenever your medications are discontinued, doses are 
    changed, or new medications (including over-the-counter products) are added. *  Please carry medication information at all times in case of emergency situations. These are general instructions for a healthy lifestyle: No smoking/ No tobacco products/ Avoid exposure to second hand smoke Surgeon General's Warning:  Quitting smoking now greatly reduces serious risk to your health. Obesity, smoking, and sedentary lifestyle greatly increases your risk for illness A healthy diet, regular physical exercise & weight monitoring are important for maintaining a healthy lifestyle You may be retaining fluid if you have a history of heart failure or if you experience any of the following symptoms:  Weight gain of 3 pounds or more overnight or 5 pounds in a week, increased swelling in our hands or feet or shortness of breath while lying flat in bed. Please call your doctor as soon as you notice any of these symptoms; do not wait until your next office visit. Recognize signs and symptoms of STROKE: 
 
F-face looks uneven A-arms unable to move or move unevenly S-speech slurred or non-existent T-time-call 911 as soon as signs and symptoms begin-DO NOT go Back to bed or wait to see if you get better-TIME IS BRAIN. Warning Signs of HEART ATTACK Call 911 if you have these symptoms: 
? Chest discomfort. Most heart attacks involve discomfort in the center of the chest that lasts more than a few minutes, or that goes away and comes back. It can feel like uncomfortable pressure, squeezing, fullness, or pain. ? Discomfort in other areas of the upper body. Symptoms can include pain or discomfort in one or both arms, the back, neck, jaw, or stomach. ? Shortness of breath with or without chest discomfort. ? Other signs may include breaking out in a cold sweat, nausea, or lightheadedness. Don't wait more than five minutes to call [address]! Fast action can save your life. Calling 911 is almost always the fastest way to get lifesaving treatment. Emergency Medical Services staff can begin treatment when they arrive  up to an hour sooner than if someone gets to the hospital by car. The discharge information has been reviewed with the patient.   The patient verbalized understanding. Discharge medications reviewed with the patient and appropriate educational materials and side effects teaching were provided. Cardiac Rehabilitation: Care Instructions Your Care Instructions Cardiac rehabilitation is a program for people who have a heart problem, such as a heart attack, heart failure, or a heart valve disease. The program includes exercise, lifestyle changes, education, and emotional support. Cardiac rehab can help you improve the quality of your life through better overall health. It can help you lose weight and feel better about yourself. On your cardiac rehab team, you may have your doctor, a nurse specialist, a dietitian, and a physical therapist. They will design your cardiac rehab program specifically for you. You will learn how to reduce your risk for heart problems, how to manage stress, and how to eat a heart-healthy diet. By the end of the program, you will be ready to maintain a healthier lifestyle on your own. Follow-up care is a key part of your treatment and safety. Be sure to make and go to all appointments, and call your doctor if you are having problems. It's also a good idea to know your test results and keep a list of the medicines you take. How can you care for yourself at home? · Take your medicines exactly as prescribed. Call your doctor if you think you are having a problem with your medicine. You will get more details on the specific medicines your doctor prescribes. · Weigh yourself every day if your doctor tells you to. Watch for sudden weight gain. Weigh yourself on the same scale with the same amount of clothing at the same time of day. · Plan your meals so that you are eating heart-healthy foods. ¨ Eat a variety of foods daily. Fresh fruits and vegetables and whole-grains are good choices. ¨ Limit your fat intake, especially saturated and trans fat. ¨ Limit salt (sodium). ¨ Increase fiber in your diet. ¨ Limit alcohol. · Learn how to take your pulse so that you can track your heart rate during exercise. · Always check with your doctor before you begin a new exercise program. 
· Warm up before you exercise and cool down afterward for at least 15 minutes each. This will help your heart gradually prepare for and recover from exercise and avoid pushing your heart too hard. · Stop exercising if you have any unusual discomfort, such as chest pain. · Do not smoke. Smoking can make heart problems worse. If you need help quitting, talk to your doctor about stop-smoking programs and medicines. These can increase your chances of quitting for good. When should you call for help? Call 911 anytime you think you may need emergency care. For example, call if: 
· You have severe trouble breathing. · You cough up pink, foamy mucus and you have trouble breathing. · You have symptoms of a heart attack. These may include: ¨ Chest pain or pressure, or a strange feeling in the chest. 
¨ Sweating. ¨ Shortness of breath. ¨ Nausea or vomiting. ¨ Pain, pressure, or a strange feeling in the back, neck, jaw, or upper belly or in one or both shoulders or arms. ¨ Lightheadedness or sudden weakness. ¨ A fast or irregular heartbeat. After you call 911, the  may tell you to chew 1 adult-strength or 2 to 4 low-dose aspirin. Wait for an ambulance. Do not try to drive yourself. · You have angina symptoms (such as chest pain or pressure) that do not go away with rest or are not getting better within 5 minutes after you take a dose of nitroglycerin. · You have symptoms of a stroke. These may include: 
¨ Sudden numbness, tingling, weakness, or loss of movement in your face, arm, or leg, especially on only one side of your body. ¨ Sudden vision changes. ¨ Sudden trouble speaking. ¨ Sudden confusion or trouble understanding simple statements. ¨ Sudden problems with walking or balance. ¨ A sudden, severe headache that is different from past headaches. · You passed out (lost consciousness). Call your doctor now or seek immediate medical care if: 
· You have new or increased shortness of breath. · You are dizzy or lightheaded, or you feel like you may faint. · You gain weight suddenly, such as more than 2 to 3 pounds in a day or 5 pounds in a week. (Your doctor may suggest a different range of weight gain.) · You have increased swelling in your legs, ankles, or feet. Watch closely for changes in your health, and be sure to contact your doctor if you have any problems. Where can you learn more? Go to http://itzel-palka.info/. Enter U737 in the search box to learn more about \"Cardiac Rehabilitation: Care Instructions. \" Current as of: February 23, 2017 Content Version: 11.3 © 0632-4136 [employer]. Care instructions adapted under license by [employer] (which disclaims liability or warranty for this information). If you have questions about a medical condition or this instruction, always ask your healthcare professional. [place] [address] any warranty or liability for your use of this information. Avoiding Triggers With Heart Failure: Care Instructions Your Care Instructions Triggers are anything that make your heart failure flare up. A flare-up is also called \"sudden heart failure\" or \"acute heart failure. \" When you have a flare-up, fluid builds up in your lungs, and you have problems breathing. You might need to go to the hospital. By watching for changes in your condition and avoiding triggers, you can prevent heart failure flare-ups. Follow-up care is a key part of your treatment and safety. Be sure to make and go to all appointments, and call your doctor if you are having problems. It's also a good idea to know your test results and keep a list of the medicines you take. How can you care for yourself at home? Watch for changes in your weight and condition · Weigh yourself without clothing at the same time each day. Record your weight. Call your doctor if you have sudden weight gain, such as more than 2 to 3 pounds in a day or 5 pounds in a week. (Your doctor may suggest a different range of weight gain.) A sudden weight gain may mean that your heart failure is getting worse. · Keep a daily record of your symptoms. Write down any changes in how you feel, such as new shortness of breath, cough, or problems eating. Also record if your ankles are more swollen than usual and if you feel more tired than usual. Note anything that you ate or did that could have triggered these changes. Limit sodium Sodium causes your body to hold on to extra water. This may cause your heart failure symptoms to get worse. People get most of their sodium from processed foods. Fast food and restaurant meals also tend to be very high in sodium. · Your doctor may suggest that you limit sodium to 2,000 milligrams (mg) a day or less. That is less than 1 teaspoon of salt a day, including all the salt you eat in cooking or in packaged foods. · Read food labels on cans and food packages. They tell you how much sodium you get in one serving. Check the serving size. If you eat more than one serving, you are getting more sodium. · Be aware that sodium can come in forms other than salt, including monosodium glutamate (MSG), sodium citrate, and sodium bicarbonate (baking soda). MSG is often added to Asian food. You can sometimes ask for food without MSG or salt. · Slowly reducing salt will help you adjust to the taste. Take the salt shaker off the table. · Flavor your food with garlic, lemon juice, onion, vinegar, herbs, and spices instead of salt. Do not use soy sauce, steak sauce, onion salt, garlic salt, mustard, or ketchup on your food, unless it is labeled \"low-sodium\" or \"low-salt. \" 
 · Make your own salad dressings, sauces, and ketchup without adding salt. · Use fresh or frozen ingredients, instead of canned ones, whenever you can. Choose low-sodium canned goods. · Eat less processed food and food from restaurants, including fast food. Exercise as directed Moderate, regular exercise is very good for your heart. It improves your blood flow and helps control your weight. But too much exercise can stress your heart and cause a heart failure flare-up. · Check with your doctor before you start an exercise program. 
· Walking is an easy way to get exercise. Start out slowly. Gradually increase the length and pace of your walk. Swimming, riding a bike, and using a treadmill are also good forms of exercise. · When you exercise, watch for signs that your heart is working too hard. You are pushing yourself too hard if you cannot talk while you are exercising. If you become short of breath or dizzy or have chest pain, stop, sit down, and rest. 
· Do not exercise when you do not feel well. Take medicines correctly · Take your medicines exactly as prescribed. Call your doctor if you think you are having a problem with your medicine. · Make a list of all the medicines you take. Include those prescribed to you by other doctors and any over-the-counter medicines, vitamins, or supplements you take. Take this list with you when you go to any doctor. · Take your medicines at the same time every day. It may help you to post a list of all the medicines you take every day and what time of day you take them. · Make taking your medicine as simple as you can. Plan times to take your medicines when you are doing other things, such as eating a meal or getting ready for bed. This will make it easier to remember to take your medicines. · Get organized. Use helpful tools, such as daily or weekly pill containers. When should you call for help? Call 911 if you have symptoms of sudden heart failure such as: · You have severe trouble breathing. · You cough up pink, foamy mucus. · You have a new irregular or rapid heartbeat. Call your doctor now or seek immediate medical care if: 
· You have new or increased shortness of breath. · You are dizzy or lightheaded, or you feel like you may faint. · You have sudden weight gain, such as more than 2 to 3 pounds in a day or 5 pounds in a week. (Your doctor may suggest a different range of weight gain.) · You have increased swelling in your legs, ankles, or feet. · You are suddenly so tired or weak that you cannot do your usual activities. Watch closely for changes in your health, and be sure to contact your doctor if you develop new symptoms. Where can you learn more? Go to http://itzel-palak.info/. Enter Y373 in the search box to learn more about \"Avoiding Triggers With Heart Failure: Care Instructions. \" Current as of: February 23, 2017 Content Version: 11.3 © 2356-9958 [employer]. Care instructions adapted under license by [employer] (which disclaims liability or warranty for this information). If you have questions about a medical condition or this instruction, always ask your healthcare professional. [address] any warranty or liability for your use of this information. Learning About Heart Failure What is heart failure? Heart failure means that your heart muscle does not pump as much blood as your body needs. Failure does not mean that your heart has stopped. It means that your heart is not pumping as well as it should. Your body has an amazing ability to make up for heart failure. It may do such a good job that you don't know you have a disease. But at some point, your heart and body will no longer be able to keep up. Then fluid starts to build up in your lungs and other parts of your body. What can you expect when you have heart failure? Heart failure is a lifelong (chronic) disease. Treatment may be able to slow the disease and help you feel better. But heart failure tends to get worse over time. Despite this, there are many steps you can take to feel better and stay healthy longer. Early on, your symptoms may not be too bad. As heart failure gets worse, symptoms typically get worse, and you may need to limit your activities. Heart failure can also get worse suddenly. If this happens, you need emergency care. Then, after treatment, your symptoms may go back to being stable (which means they stay the same) for a long time. Heart failure can lead to other health problems, such as heart rhythm problems. Over time, your treatment options may change, especially as your symptoms get worse. As heart failure gets worse, palliative care can help improve the quality of your life. You can do advance care planning to decide what kind of care you want at the end of your life. What are the symptoms? Symptoms of heart failure start to happen when your heart can't pump enough blood to the rest of your body. In the early stages of heart failure, you may: · Feel tired easily. · Be short of breath when you exert yourself. · Feel like your heart is pounding or racing (palpitations). · Feel weak or dizzy. As heart failure gets worse, fluid starts to build up in your lungs and other parts of your body. This may cause you to: · Feel short of breath even at rest. 
· Have swelling (edema), especially in your legs, ankles, and feet. · Gain weight. This may happen over just a day or two, or more slowly. · Cough or wheeze, especially when you lie down. How is heart failure treated? · You'll probably take several medicines. · You might attend cardiac rehabilitation (rehab) to get education and support that help you make lifestyle changes and stay as healthy as possible. · You may get a heart device. A pacemaker helps your heart pump blood.  An ICD can stop abnormal heart rhythms. How can you care for yourself? There are many steps you can take to feel better and stay healthy longer. These steps are an important part of treatment. They can help you stay active and enjoy life. · Take your medicine the right way. Avoid medicines that can make your symptoms worse. · Check your weight and symptoms every day. Know what to do if your symptoms get worse. · Limit sodium. This helps keep fluid from building up. It may help you feel better. · Be active. Exercise regularly, but don't exercise too hard. · Be heart-healthy. Eat healthy foods, stay at a healthy weight, limit alcohol, and don't smoke. · Stay as healthy as possible. Avoid colds and flu, get help for depression and anxiety, and manage stress. Follow-up care is a key part of your treatment and safety. Be sure to make and go to all appointments, and call your doctor if you are having problems. It's also a good idea to know your test results and keep a list of the medicines you take. Where can you learn more? Go to http://itzel-palak.info/. Enter B269 in the search box to learn more about \"Learning About Heart Failure. \" Current as of: November 15, 2016 Content Version: 11.3 © 4296-4909 [employer]. Care instructions adapted under license by [employer] (which disclaims liability or warranty for this information). If you have questions about a medical condition or this instruction, always ask your healthcare professional. [place] [address] any warranty or liability for your use of this information. Learning About Self-Care for Heart Failure What is self-care for heart failure? Heart failure usually gets worse over time.  But there are many things you can do to feel better, stay healthy longer, and avoid the hospital. 
Self-care means managing your health by doing certain things every day, like weighing yourself. It's about knowing which symptoms to watch for so you can avoid getting worse. When you practice good self-care, you know when it's time to call your doctor and when your heart failure has turned into an emergency. The lists below can help. Top 5 self-care tips for every day 1. Take your medicines as prescribed. This gives them the best chance of helping you. 2. Watch for signs that you're getting worse. Weighing yourself every day is one of the best ways to do this. Weight gain may be a sign that your body is holding on to too much fluid. Weigh yourself at the same time each day, using the same scale, on a hard, flat surface. The best time is in the morning after you go to the bathroom and before you eat or drink anything. 3. Find out what your triggers are, and learn to avoid them. Triggers are things that make your heart failure worse, often suddenly. A trigger may be eating too much salt, missing a dose of your medicine, or exercising too hard. 4. Limit sodium. This helps keep fluid from building up and may help you feel better. Your doctor may suggest that you limit sodium to 2,000 milligrams (mg) a day or less. That's less than 1 teaspoon. You can stay under this amount by limiting the salt you eat at home and by watching for \"hidden\" sodium when you eat out or shop for food. 5. Try to exercise throughout the week. Exercise makes your heart stronger and can help you avoid symptoms. Walking is a great way to get exercise. If your doctor says it's safe, start out with some short walks, and then slowly build up to longer ones. When should you act? Try to become familiar with signs that mean your heart failure is getting worse. If you need help, talk with your doctor about making a personal plan. Here are some things to watch for as you practice your daily self-care.  Call your doctor if: 
· You have sudden weight gain, such as more than 2 to 3 pounds in a day or 5 pounds in a week. (Your doctor may suggest a different range of weight gain.) · You have new or worse swelling in your feet, ankles, or legs. · Your breathing gets worse. Activities that did not make you short of breath before are hard for you now. · Your breathing when you lie down is worse than usual, or you wake up at night needing to catch your breath. Be sure to make and go to all of your follow-up appointments. And it's always a good idea to call your doctor anytime you have a sudden change in symptoms. When is it an emergency? Sometimes the symptoms get worse very quickly. This is called sudden heart failure. It causes fluid to build up in your lungs. Sudden heart failure is an emergency. If you have any of these symptoms, you need care right away. Call 911 if: 
· You have severe shortness of breath. · You have an irregular or fast heartbeat. · You cough up foamy, pink mucus. What else can you do to stay healthy? There are other things you can do to take care of your body and your heart. These things will help you feel better. And they will also reduce your risk of heart attack and stroke. · Try to stay at a healthy weight. Eat a healthy diet with lots of fresh fruit, vegetables, and whole grains. · If you smoke, quit. · Limit the amount of alcohol you drink. · Keep high blood pressure and diabetes under control. If you need help, talk with your doctor. If your doctor has not set you up with a cardiac rehabilitation (rehab) program, talk to him or her about whether that is right for you. Cardiac rehab includes exercise, help with diet and lifestyle changes, and emotional support. Also let your doctor know if: 
· You're having trouble sleeping. Sleep is important to your well-being. It also helps your heart work the way it's supposed to. Your doctor can help you decide if you need treatment for sleep problems.  
· You're feeling sad or hopeless much of the time, or you are worried and anxious. Heart failure can be hard on your emotions. Treatment with counseling and medicine can help. And when you feel better, you're more likely to take care of yourself. Follow-up care is a key part of your treatment and safety. Be sure to make and go to all appointments, and call your doctor if you are having problems. It's also a good idea to know your test results and keep a list of the medicines you take. Where can you learn more? Go to http://itzel-palak.info/. Enter B210 in the search box to learn more about \"Learning About Self-Care for Heart Failure. \" Current as of: March 16, 2017 Content Version: 11.3 © 5277-6257 [employer]. Care instructions adapted under license by [employer] (which disclaims liability or warranty for this information). If you have questions about a medical condition or this instruction, always ask your healthcare professional. [place] [address] any warranty or liability for your use of this information. Discharge Orders None Introducing [facility]! Dear Jessica Olivia: Thank you for requesting a [employer] account. Our records indicate that you already have an active [employer] account. You can access your account anytime at [email or web address] Did you know that you can access your hospital and ER discharge instructions at any time in [employer]? You can also review all of your test results from your hospital stay or ER visit. Additional Information If you have questions, please visit the Frequently Asked Questions section of the [employer] website at [email or web address] Remember, [employer] is NOT to be used for urgent needs. For medical emergencies, dial 911. Now available from your iPhone and Android! General Information Please provide this summary of care documentation to your next provider.  
  
  
    
    
 Patient Signature: ____________________________________________________________ Date:  ____________________________________________________________  
  
Edrie Gunnels Provider Signature:  ____________________________________________________________ Date:  ____________________________________________________________

## 2017-07-19 PROBLEM — R07.9 CHEST PAIN: Status: ACTIVE | Noted: 2017-07-19

## 2017-07-19 PROBLEM — I50.9 CHF (CONGESTIVE HEART FAILURE) (HCC): Status: ACTIVE | Noted: 2017-07-19

## 2017-07-19 LAB
ANION GAP BLD CALC-SCNC: 8 MMOL/L (ref 3–18)
ATRIAL RATE: 74 BPM
ATRIAL RATE: 96 BPM
BASOPHILS # BLD AUTO: 0 K/UL (ref 0–0.06)
BASOPHILS # BLD: 0 % (ref 0–2)
BUN SERPL-MCNC: 16 MG/DL (ref 7–18)
BUN/CREAT SERPL: 19 (ref 12–20)
CALCIUM SERPL-MCNC: 9.1 MG/DL (ref 8.5–10.1)
CALCULATED P AXIS, ECG09: 86 DEGREES
CALCULATED P AXIS, ECG09: 93 DEGREES
CALCULATED R AXIS, ECG10: -76 DEGREES
CALCULATED R AXIS, ECG10: -77 DEGREES
CALCULATED T AXIS, ECG11: 87 DEGREES
CALCULATED T AXIS, ECG11: 91 DEGREES
CHLORIDE SERPL-SCNC: 102 MMOL/L (ref 100–108)
CO2 SERPL-SCNC: 28 MMOL/L (ref 21–32)
CREAT SERPL-MCNC: 0.84 MG/DL (ref 0.6–1.3)
DIAGNOSIS, 93000: NORMAL
DIAGNOSIS, 93000: NORMAL
DIFFERENTIAL METHOD BLD: ABNORMAL
EOSINOPHIL # BLD: 0.1 K/UL (ref 0–0.4)
EOSINOPHIL NFR BLD: 0 % (ref 0–5)
ERYTHROCYTE [DISTWIDTH] IN BLOOD BY AUTOMATED COUNT: 13.9 % (ref 11.6–14.5)
GLUCOSE SERPL-MCNC: 106 MG/DL (ref 74–99)
HCT VFR BLD AUTO: 37 % (ref 35–45)
HGB BLD-MCNC: 12.2 G/DL (ref 12–16)
LYMPHOCYTES # BLD AUTO: 10 % (ref 21–52)
LYMPHOCYTES # BLD: 1.2 K/UL (ref 0.9–3.6)
MCH RBC QN AUTO: 30.7 PG (ref 24–34)
MCHC RBC AUTO-ENTMCNC: 33 G/DL (ref 31–37)
MCV RBC AUTO: 93 FL (ref 74–97)
MONOCYTES # BLD: 1.2 K/UL (ref 0.05–1.2)
MONOCYTES NFR BLD AUTO: 10 % (ref 3–10)
NEUTS SEG # BLD: 9.7 K/UL (ref 1.8–8)
NEUTS SEG NFR BLD AUTO: 80 % (ref 40–73)
P-R INTERVAL, ECG05: 172 MS
P-R INTERVAL, ECG05: 192 MS
PLATELET # BLD AUTO: 192 K/UL (ref 135–420)
PMV BLD AUTO: 11.3 FL (ref 9.2–11.8)
POTASSIUM SERPL-SCNC: 4 MMOL/L (ref 3.5–5.5)
Q-T INTERVAL, ECG07: 404 MS
Q-T INTERVAL, ECG07: 470 MS
QRS DURATION, ECG06: 178 MS
QRS DURATION, ECG06: 194 MS
QTC CALCULATION (BEZET), ECG08: 510 MS
QTC CALCULATION (BEZET), ECG08: 521 MS
RBC # BLD AUTO: 3.98 M/UL (ref 4.2–5.3)
SODIUM SERPL-SCNC: 138 MMOL/L (ref 136–145)
TROPONIN I SERPL-MCNC: 0.03 NG/ML (ref 0–0.04)
TROPONIN I SERPL-MCNC: <0.02 NG/ML (ref 0–0.04)
VENTRICULAR RATE, ECG03: 74 BPM
VENTRICULAR RATE, ECG03: 96 BPM
WBC # BLD AUTO: 12.2 K/UL (ref 4.6–13.2)

## 2017-07-19 PROCEDURE — 74011250637 HC RX REV CODE- 250/637: Performed by: INTERNAL MEDICINE

## 2017-07-19 PROCEDURE — 80048 BASIC METABOLIC PNL TOTAL CA: CPT | Performed by: FAMILY MEDICINE

## 2017-07-19 PROCEDURE — C8924 2D TTE W OR W/O FOL W/CON,FU: HCPCS

## 2017-07-19 PROCEDURE — 84484 ASSAY OF TROPONIN QUANT: CPT | Performed by: EMERGENCY MEDICINE

## 2017-07-19 PROCEDURE — 65660000000 HC RM CCU STEPDOWN

## 2017-07-19 PROCEDURE — 36415 COLL VENOUS BLD VENIPUNCTURE: CPT | Performed by: FAMILY MEDICINE

## 2017-07-19 PROCEDURE — 96372 THER/PROPH/DIAG INJ SC/IM: CPT

## 2017-07-19 PROCEDURE — 74011250637 HC RX REV CODE- 250/637: Performed by: FAMILY MEDICINE

## 2017-07-19 PROCEDURE — 85025 COMPLETE CBC W/AUTO DIFF WBC: CPT | Performed by: FAMILY MEDICINE

## 2017-07-19 PROCEDURE — 74011250636 HC RX REV CODE- 250/636: Performed by: INTERNAL MEDICINE

## 2017-07-19 PROCEDURE — 93005 ELECTROCARDIOGRAM TRACING: CPT

## 2017-07-19 PROCEDURE — 96376 TX/PRO/DX INJ SAME DRUG ADON: CPT

## 2017-07-19 PROCEDURE — 94640 AIRWAY INHALATION TREATMENT: CPT

## 2017-07-19 PROCEDURE — 74011250636 HC RX REV CODE- 250/636: Performed by: HOSPITALIST

## 2017-07-19 PROCEDURE — 74011000250 HC RX REV CODE- 250: Performed by: INTERNAL MEDICINE

## 2017-07-19 PROCEDURE — 74011250636 HC RX REV CODE- 250/636: Performed by: FAMILY MEDICINE

## 2017-07-19 PROCEDURE — 93971 EXTREMITY STUDY: CPT

## 2017-07-19 PROCEDURE — 99218 HC RM OBSERVATION: CPT

## 2017-07-19 RX ORDER — LORAZEPAM 2 MG/ML
INJECTION INTRAMUSCULAR
Status: DISPENSED
Start: 2017-07-19 | End: 2017-07-19

## 2017-07-19 RX ORDER — GUAIFENESIN 100 MG/5ML
81 LIQUID (ML) ORAL DAILY
Status: DISCONTINUED | OUTPATIENT
Start: 2017-07-19 | End: 2017-07-21 | Stop reason: HOSPADM

## 2017-07-19 RX ORDER — CARVEDILOL 3.12 MG/1
3.12 TABLET ORAL 2 TIMES DAILY WITH MEALS
Status: DISCONTINUED | OUTPATIENT
Start: 2017-07-19 | End: 2017-07-21

## 2017-07-19 RX ORDER — FUROSEMIDE 10 MG/ML
40 INJECTION INTRAMUSCULAR; INTRAVENOUS 2 TIMES DAILY
Status: COMPLETED | OUTPATIENT
Start: 2017-07-19 | End: 2017-07-20

## 2017-07-19 RX ORDER — ENOXAPARIN SODIUM 100 MG/ML
40 INJECTION SUBCUTANEOUS EVERY 24 HOURS
Status: DISCONTINUED | OUTPATIENT
Start: 2017-07-19 | End: 2017-07-21 | Stop reason: HOSPADM

## 2017-07-19 RX ORDER — HYDROCHLOROTHIAZIDE 25 MG/1
25 TABLET ORAL DAILY
Status: DISCONTINUED | OUTPATIENT
Start: 2017-07-19 | End: 2017-07-19

## 2017-07-19 RX ORDER — ATORVASTATIN CALCIUM 20 MG/1
10 TABLET, FILM COATED ORAL DAILY
Status: DISCONTINUED | OUTPATIENT
Start: 2017-07-19 | End: 2017-07-21 | Stop reason: HOSPADM

## 2017-07-19 RX ORDER — ONDANSETRON 2 MG/ML
4 INJECTION INTRAMUSCULAR; INTRAVENOUS
Status: DISCONTINUED | OUTPATIENT
Start: 2017-07-19 | End: 2017-07-21 | Stop reason: HOSPADM

## 2017-07-19 RX ORDER — SODIUM CHLORIDE 0.9 % (FLUSH) 0.9 %
5-10 SYRINGE (ML) INJECTION EVERY 8 HOURS
Status: DISCONTINUED | OUTPATIENT
Start: 2017-07-19 | End: 2017-07-21 | Stop reason: HOSPADM

## 2017-07-19 RX ORDER — BUPROPION HYDROCHLORIDE 150 MG/1
150 TABLET ORAL
Status: DISCONTINUED | OUTPATIENT
Start: 2017-07-19 | End: 2017-07-21 | Stop reason: HOSPADM

## 2017-07-19 RX ORDER — MORPHINE SULFATE 2 MG/ML
2 INJECTION, SOLUTION INTRAMUSCULAR; INTRAVENOUS
Status: DISCONTINUED | OUTPATIENT
Start: 2017-07-19 | End: 2017-07-21 | Stop reason: HOSPADM

## 2017-07-19 RX ORDER — FUROSEMIDE 10 MG/ML
40 INJECTION INTRAMUSCULAR; INTRAVENOUS DAILY
Status: DISCONTINUED | OUTPATIENT
Start: 2017-07-19 | End: 2017-07-19

## 2017-07-19 RX ORDER — SODIUM CHLORIDE 0.9 % (FLUSH) 0.9 %
5-10 SYRINGE (ML) INJECTION AS NEEDED
Status: DISCONTINUED | OUTPATIENT
Start: 2017-07-19 | End: 2017-07-21 | Stop reason: HOSPADM

## 2017-07-19 RX ORDER — LOSARTAN POTASSIUM 50 MG/1
25 TABLET ORAL DAILY
Status: DISCONTINUED | OUTPATIENT
Start: 2017-07-19 | End: 2017-07-21 | Stop reason: HOSPADM

## 2017-07-19 RX ORDER — DESVENLAFAXINE SUCCINATE 50 MG/1
50 TABLET, EXTENDED RELEASE ORAL DAILY
Status: DISCONTINUED | OUTPATIENT
Start: 2017-07-19 | End: 2017-07-21 | Stop reason: HOSPADM

## 2017-07-19 RX ADMIN — FUROSEMIDE 40 MG: 10 INJECTION, SOLUTION INTRAMUSCULAR; INTRAVENOUS at 17:07

## 2017-07-19 RX ADMIN — ENOXAPARIN SODIUM 40 MG: 40 INJECTION SUBCUTANEOUS at 11:12

## 2017-07-19 RX ADMIN — ONDANSETRON 4 MG: 2 INJECTION INTRAMUSCULAR; INTRAVENOUS at 10:33

## 2017-07-19 RX ADMIN — DESVENLAFAXINE SUCCINATE 50 MG: 50 TABLET, EXTENDED RELEASE ORAL at 11:09

## 2017-07-19 RX ADMIN — PERFLUTREN 1 ML: 6.52 INJECTION, SUSPENSION INTRAVENOUS at 12:30

## 2017-07-19 RX ADMIN — ATORVASTATIN CALCIUM 10 MG: 20 TABLET, FILM COATED ORAL at 11:08

## 2017-07-19 RX ADMIN — CARVEDILOL 3.12 MG: 3.12 TABLET, FILM COATED ORAL at 17:07

## 2017-07-19 RX ADMIN — Medication 10 ML: at 11:12

## 2017-07-19 RX ADMIN — Medication 10 ML: at 14:00

## 2017-07-19 RX ADMIN — BUPROPION HYDROCHLORIDE 150 MG: 150 TABLET, FILM COATED, EXTENDED RELEASE ORAL at 11:09

## 2017-07-19 RX ADMIN — Medication 10 ML: at 22:04

## 2017-07-19 RX ADMIN — LOSARTAN POTASSIUM 25 MG: 50 TABLET ORAL at 11:09

## 2017-07-19 RX ADMIN — ASPIRIN 81 MG 81 MG: 81 TABLET ORAL at 11:08

## 2017-07-19 RX ADMIN — FUROSEMIDE 40 MG: 10 INJECTION, SOLUTION INTRAMUSCULAR; INTRAVENOUS at 11:09

## 2017-07-19 RX ADMIN — HYDROCHLOROTHIAZIDE 25 MG: 25 TABLET ORAL at 11:09

## 2017-07-19 NOTE — PROGRESS NOTES
Progress Note      Patient: Fidel Horn               Sex: female          DOA: 7/18/2017       YOB: 1955      Age:  59 y.o.        LOS:  LOS: 0 days               Subjective:   Help of cardiology greatly appreciated . the patient has acute systolic  chf exacerbation as she has an ef of 30 % . She also dieudonne a h/o bronchiolitis obliterans but her lungs are clear today . Discussed with dr Lizandro Huang . theophylline pt's chads score is 1. She is s/p atrial flutter ablation . she is s/p pacemaker .  The pt has been given iv lasix and has been put on coreg       Objective:      Visit Vitals    /71 (BP 1 Location: Right arm, BP Patient Position: At rest)    Pulse 66    Temp 99.3 °F (37.4 °C)    Resp 20    Ht 5' 2\" (1.575 m)    Wt 101.8 kg (224 lb 6.4 oz)    SpO2 97%    Breastfeeding No    BMI 41.04 kg/m2       Physical Exam:  Pt is alert and denies dyspnea at rest .discussed with herfqamily   Heart reg rate an rhythm   Lungs good breath sounds heard   abdomen soft and no pain on plapation   Neuro nonfocal      Lab/Data Reviewed:  CMP:   Lab Results   Component Value Date/Time     07/19/2017 11:20 AM    K 4.0 07/19/2017 11:20 AM     07/19/2017 11:20 AM    CO2 28 07/19/2017 11:20 AM    AGAP 8 07/19/2017 11:20 AM     (H) 07/19/2017 11:20 AM    BUN 16 07/19/2017 11:20 AM    CREA 0.84 07/19/2017 11:20 AM    GFRAA >60 07/19/2017 11:20 AM    GFRNA >60 07/19/2017 11:20 AM    CA 9.1 07/19/2017 11:20 AM    ALB 3.2 (L) 07/18/2017 09:15 PM    TP 7.3 07/18/2017 09:15 PM    GLOB 4.1 (H) 07/18/2017 09:15 PM    AGRAT 0.8 07/18/2017 09:15 PM    SGOT 20 07/18/2017 09:15 PM    ALT 26 07/18/2017 09:15 PM     CBC:   Lab Results   Component Value Date/Time    WBC 12.2 07/19/2017 11:20 AM    HGB 12.2 07/19/2017 11:20 AM    HCT 37.0 07/19/2017 11:20 AM     07/19/2017 11:20 AM     All Cardiac Markers in the last 24 hours:   Lab Results   Component Value Date/Time    TROIQ <0.02 07/19/2017 11:20 AM    TROIQ 0.03 07/19/2017 01:44 AM    TROIQ 0.03 07/18/2017 09:15 PM           Assessment/Plan     Principal Problem:    CHF (congestive heart failure) ([address] (7/19/2017 pt has acute systolic chf wuith an ef of 30 %    Active Problems:    Chest pain (7/19/2017)  Atrial flutter ablation  S/p pacemaker   chads score of 1   htn  Sleep apnea the patient uses a cpap      Plan:pt is being diuresed as per cardiology . will be on coreg

## 2017-07-19 NOTE — CONSULTS
[facility] - Consult Note    Consultation request by Porter Hobson MD for advice/opinion related to evaluating CHF (congestive heart failure) ([facility] Chest pain;CHF Doloris Hodgkins*    Date of  Admission: 7/18/2017  7:44 PM   Primary Care Physician:  Brianna Harding MD     Assessment:     -  Acute respiratory distress; Most likely CHF exacerbation  -  Acute systolic CHF exacerbation. LVEF 30% in 03/17  -  Chest piressure; Most likely from CHF exacerbation. Doubt true angina. MI ruled out.  Atrial flutter    CHADS score 1  (-CHF, +HTN, -AGE, -DM, -CVA)   Atrial flutter ablation    3/13   Cardiomyopathy ([facility]    LVEF 30% (03/17)   Dyslipidemia    Hypertension    Obstructive sleep apnea    CPAP   Pacemaker    3/13 ([employer])       Plan:     Admitted with most likely systolic CHF exacerbation. Known a.flutter and pacemaker. MI ruled out    - DC HCTZ  - Will give lasix 40 mg IV BID for another 2 more doses. Fluid overload has improved. NO significant rales after lasix this am  - Start coreg 3.125 mg BID for cardiomyopathy and HTN. ALready have back up pacer  - Continue ARB  - - Strict I/O  - Fluid restriction 1.5-1.8 L / Day  - Advised patient for salt restriction in diet. - Sign, symptoms of CHF and diagnosis and management for CHF was discussed with patient in detail.   - Patient was advised to obtain daily am weight and if there is weight gain > 5 lbs over 3-4 days, was advised to take extra dose of diuretics for few days and once achieve baseline weight, reduce back to maintanance dose. - Limited echo today     History of Present Illness: This is a 59 y.o. female admitted for CHF (congestive heart failure) ([facility] Chest pain;CHF (conges*. Patient complains of:    SOB and chest pressure  59year old female with known ef 30%. Aflutter ablation and PPM in 2013  Came with SOB for last 2/3 days.   Chronic LLE swelling, unchanged  No obvious PND  Some chest pressure on deep breathing. V/Q and LE dopper unremarkable  Cardiolgoy called for CHF    Currently Nervous. No CP   No SOB, improved  No palpitations or syncope    Cardiac risk factors: none      Review of Symptoms:  Except as stated above include:  Constitutional:  Negative except Chset pressure, SOB and swelling  Respiratory:  negative  Cardiovascular:  negative  Gastrointestinal: negative  Genitourinary:  negative  Musculoskeletal:  Negative  Neurological:  Negative  Dermatological:  Negative  Endocrinological: Negative  Psychological:  Negative    A comprehensive review of systems was negative.      Past Medical History:     Past Medical History:   Diagnosis Date    Atrial flutter     CHADS score 1  (-CHF, +HTN, -AGE, -DM, -CVA)    Atrial flutter ablation     3/13    Cardiomyopathy ([address]     LVEF 30% (03/17)    Dyslipidemia     Hypertension     Obstructive sleep apnea     CPAP    Pacemaker     3/13 ([employer])         Social History:     Social History     Social History    Marital status:      Spouse name: N/A    Number of children: N/A    Years of education: N/A     Social History Main Topics    Smoking status: Never Smoker    Smokeless tobacco: Never Used    Alcohol use Yes    Drug use: No    Sexual activity: Not Asked     Other Topics Concern    None     Social History Narrative        Family History:     Family History   Problem Relation Age of Onset    Breast Cancer Sister 59        Medications:   No Known Allergies     Current Facility-Administered Medications   Medication Dose Route Frequency    LORazepam (ATIVAN) 2 mg/mL injection        sodium chloride (NS) flush 5-10 mL  5-10 mL IntraVENous Q8H    sodium chloride (NS) flush 5-10 mL  5-10 mL IntraVENous PRN    morphine injection 2 mg  2 mg IntraVENous Q4H PRN    enoxaparin (LOVENOX) injection 40 mg  40 mg SubCUTAneous Q24H    furosemide (LASIX) injection 40 mg  40 mg IntraVENous DAILY    aspirin chewable tablet 81 mg  81 mg Oral DAILY    atorvastatin (LIPITOR) tablet 10 mg  10 mg Oral DAILY    buPROPion XL (WELLBUTRIN XL) tablet 150 mg  150 mg Oral 7am    desvenlafaxine succinate (PRISTIQ) ER tablet 50 mg  50 mg Oral DAILY    hydroCHLOROthiazide (HYDRODIURIL) tablet 25 mg  25 mg Oral DAILY    losartan (COZAAR) tablet 25 mg  25 mg Oral DAILY    ondansetron (ZOFRAN) injection 4 mg  4 mg IntraVENous Q6H PRN         Physical Exam:     Visit Vitals    /73    Pulse 72    Temp 97.8 °F (36.6 °C)    Resp 22    Ht 5' 2\" (1.575 m)    Wt 224 lb 6.4 oz (101.8 kg)    SpO2 95%    Breastfeeding No    BMI 41.04 kg/m2     BP Readings from Last 3 Encounters:   07/19/17 144/73   06/20/17 136/73   01/04/17 129/73     Pulse Readings from Last 3 Encounters:   07/19/17 72   06/20/17 73   01/04/17 84     Wt Readings from Last 3 Encounters:   07/19/17 224 lb 6.4 oz (101.8 kg)   06/20/17 213 lb (96.6 kg)   01/04/17 213 lb (96.6 kg)       General:  alert, cooperative, no distress, appears stated age  Neck:  no JVD  Lungs:  Minimal basilar rales  Heart:  regular rate and rhythm, S1, S2 normal, no murmur, click, rub or gallop  Abdomen:  abdomen is soft without significant tenderness, masses, organomegaly or guarding  Extremities:  extremities normal, atraumatic, no cyanosis. Trace LLE edema. Skin: Warm and dry.  no hyperpigmentation, vitiligo, or suspicious lesions  Neuro: alert, oriented x3, affect appropriate, no focal neurological deficits, moves all extremities well, no involuntary movements, reflexes at knee and ankle intact  Psych: non focal     Data Review:     Recent Labs      07/18/17 2115   WBC  13.0   HGB  13.4   HCT  39.2   PLT  162     Recent Labs      07/18/17 2115   NA  140   K  4.0   CL  106   CO2  24   GLU  116*   BUN  11   CREA  0.78   CA  9.6   ALB  3.2*   SGOT  20   ALT  26       Results for orders placed or performed during the hospital encounter of 07/18/17   EKG, 12 LEAD, INITIAL   Result Value Ref Range    Ventricular Rate 96 BPM Atrial Rate 96 BPM    P-R Interval 192 ms    QRS Duration 178 ms    Q-T Interval 404 ms    QTC Calculation (Bezet) 510 ms    Calculated P Axis 93 degrees    Calculated R Axis -76 degrees    Calculated T Axis 91 degrees    Diagnosis       Electronic ventricular pacemaker  When compared with ECG of 06-MAR-2013 20:39,  Electronic ventricular pacemaker has replaced Sinus rhythm  Vent. rate has increased BY  44 BPM     Results for orders placed or performed in visit on 09/03/14   PACEMAKER CHECK    Impression    V-paced - 99%; A-sensed - 68%; A-paced - 33%; V-sensed - 2%; Lead  impedances and threshold WNL; some mode switching and runs of  PVCs   Results for orders placed or performed in visit on 02/11/13   AMB POC EKG ROUTINE W/ 12 LEADS, INTER & REP    Impression    Atrial flutter with controlled ventricular rate.        All Cardiac Markers in the last 24 hours:    Lab Results   Component Value Date/Time    TROIQ 0.03 07/19/2017 01:44 AM    TROIQ 0.03 07/18/2017 09:15 PM       Last Lipid:    Lab Results   Component Value Date/Time    Cholesterol, total 222 07/12/2010 08:49 AM    HDL Cholesterol 51 07/12/2010 08:49 AM    LDL, calculated 150 07/12/2010 08:49 AM    Triglyceride 105 07/12/2010 08:49 AM    CHOL/HDL Ratio 4.4 07/12/2010 08:49 AM       Signed By: Pepe Osman MD     July 19, 2017

## 2017-07-19 NOTE — H&P
History and Physical    Patient: Garland Reed               Sex: female          DOA: 7/18/2017       YOB: 1955      Age:  59 y.o.        LOS:  LOS: 0 days        Chief Complaint   Patient presents with    Chest Pain (Angina)    Shortness of Breath         HPI:     Garland Reed is a 59 y.o. female with pmhx of htn, hld, atrial flutter, pacemaker, JOSÉ MIGUEL, depression who presents with c/o sob onset 2 days ago. Patient reports that she also had heavy pressure like chest pain and was unable to breath. She denies fever, nausea, vomiting. She was positive for cough productive of white phelgm. In the ED she received morphine , lasix 40 mg, nitrobid. CXR shows cardiomegaly with BL pulmonary congestion. Patient wuill be admitted for further treatment. Past Medical History:   Diagnosis Date    Atrial flutter     CHADS score 1  (-CHF, +HTN, -AGE, -DM, -CVA)    Atrial flutter ablation     3/13    Dyslipidemia     Hypertension     Obstructive sleep apnea     CPAP    Pacemaker     3/13 ([employer])    Symptomatic bradycardia    . Past Surgical History:   Procedure Laterality Date    HX HYSTERECTOMY      ? years ago    HX OOPHORECTOMY         No current facility-administered medications on file prior to encounter. Current Outpatient Prescriptions on File Prior to Encounter   Medication Sig Dispense Refill    losartan (COZAAR) 25 mg tablet take 1 tablet by mouth once daily  0    atorvastatin (LIPITOR) 10 mg tablet   0    hydrochlorothiazide (HYDRODIURIL) 25 mg tablet Take 1 Tab by mouth daily. 90 Tab 3    aspirin 81 mg chewable tablet Take 1 Tab by mouth daily. 90 Tab 3    buPROPion XL (WELLBUTRIN XL) 150 mg tablet Take 150 mg by mouth every morning.  CALCIUM PO Take 1,200 mg by mouth daily.  multivitamin, stress formula (STRESS TAB) tablet Take 1 Tab by mouth daily.  cholecalciferol (VITAMIN D3) 1,000 unit tablet Take  by mouth daily.         omega-3 fatty acids-vitamin e (FISH OIL) 1,000 mg cap Take 1 Cap by mouth.  Desvenlafaxine SR (PRISTIQ) 50 mg tablet Take 50 mg by mouth daily. Indications: GENERALIZED ANXIETY DISORDER         Social History     Social History    Marital status:      Spouse name: N/A    Number of children: N/A    Years of education: N/A     Occupational History    Not on file. Social History Main Topics    Smoking status: Never Smoker    Smokeless tobacco: Never Used    Alcohol use Yes    Drug use: No    Sexual activity: Not on file     Other Topics Concern    Not on file     Social History Narrative       Prior to Admission Medications   Prescriptions Last Dose Informant Patient Reported? Taking? CALCIUM PO   Yes No   Sig: Take 1,200 mg by mouth daily. Desvenlafaxine SR (PRISTIQ) 50 mg tablet   Yes No   Sig: Take 50 mg by mouth daily. Indications: GENERALIZED ANXIETY DISORDER   aspirin 81 mg chewable tablet   No No   Sig: Take 1 Tab by mouth daily. atorvastatin (LIPITOR) 10 mg tablet   Yes No   buPROPion XL (WELLBUTRIN XL) 150 mg tablet   Yes No   Sig: Take 150 mg by mouth every morning. cholecalciferol (VITAMIN D3) 1,000 unit tablet   Yes No   Sig: Take  by mouth daily. hydrochlorothiazide (HYDRODIURIL) 25 mg tablet   No No   Sig: Take 1 Tab by mouth daily. losartan (COZAAR) 25 mg tablet   Yes No   Sig: take 1 tablet by mouth once daily   multivitamin, stress formula (STRESS TAB) tablet   Yes No   Sig: Take 1 Tab by mouth daily. omega-3 fatty acids-vitamin e (FISH OIL) 1,000 mg cap   Yes No   Sig: Take 1 Cap by mouth. Facility-Administered Medications: None       Family History   Problem Relation Age of Onset    Breast Cancer Sister 59       No Known Allergies    Review of Systems   Constitutional: Negative. Eyes: Negative. Respiratory: Positive for cough, sputum production, shortness of breath and wheezing. Cardiovascular: Positive for chest pain.    Gastrointestinal: Negative. Genitourinary: Negative. Musculoskeletal: Negative. Skin: Negative. Neurological: Positive for headaches. Endo/Heme/Allergies: Negative. Psychiatric/Behavioral: Negative. Physical Exam:       Visit Vitals    /58    Pulse 60    Temp 98.7 °F (37.1 °C)    Resp 22    Ht 5' 2\" (1.575 m)    Wt 101.8 kg (224 lb 6.4 oz)    SpO2 98%    Breastfeeding No    BMI 41.04 kg/m2     Physical Exam   Constitutional: She is oriented to person, place, and time. She appears well-developed and well-nourished. She appears distressed. HENT:   Head: Normocephalic and atraumatic. Eyes: Conjunctivae and EOM are normal. Pupils are equal, round, and reactive to light. No scleral icterus. Neck: Neck supple. Cardiovascular: Normal rate, regular rhythm and normal heart sounds. No murmur heard. Pulmonary/Chest: Effort normal. She has wheezes. She has rales. Abdominal: Soft. Bowel sounds are normal.   Musculoskeletal: She exhibits no edema. Neurological: She is alert and oriented to person, place, and time. Skin: Skin is warm. No rash noted. She is not diaphoretic. No erythema. No pallor. Psychiatric: She has a normal mood and affect. Ancillary Studies: All lab and imaging reviewed for the past 24 hours. Recent Results (from the past 24 hour(s))   EKG, 12 LEAD, INITIAL    Collection Time: 07/18/17  7:39 PM   Result Value Ref Range    Ventricular Rate 96 BPM    Atrial Rate 96 BPM    P-R Interval 192 ms    QRS Duration 178 ms    Q-T Interval 404 ms    QTC Calculation (Bezet) 510 ms    Calculated P Axis 93 degrees    Calculated R Axis -76 degrees    Calculated T Axis 91 degrees    Diagnosis       Electronic ventricular pacemaker  When compared with ECG of 06-MAR-2013 20:39,  Electronic ventricular pacemaker has replaced Sinus rhythm  Vent.  rate has increased BY  44 BPM     CBC WITH AUTOMATED DIFF    Collection Time: 07/18/17  9:15 PM   Result Value Ref Range    WBC 13.0 4.6 - 13.2 K/uL    RBC 4.30 4.20 - 5.30 M/uL    HGB 13.4 12.0 - 16.0 g/dL    HCT 39.2 35.0 - 45.0 %    MCV 91.2 74.0 - 97.0 FL    MCH 31.2 24.0 - 34.0 PG    MCHC 34.2 31.0 - 37.0 g/dL    RDW 13.8 11.6 - 14.5 %    PLATELET 220 065 - 893 K/uL    MPV 11.6 9.2 - 11.8 FL    NEUTROPHILS 80 (H) 40 - 73 %    LYMPHOCYTES 10 (L) 21 - 52 %    MONOCYTES 9 3 - 10 %    EOSINOPHILS 1 0 - 5 %    BASOPHILS 0 0 - 2 %    ABS. NEUTROPHILS 10.4 (H) 1.8 - 8.0 K/UL    ABS. LYMPHOCYTES 1.3 0.9 - 3.6 K/UL    ABS. MONOCYTES 1.2 0.05 - 1.2 K/UL    ABS. EOSINOPHILS 0.1 0.0 - 0.4 K/UL    ABS. BASOPHILS 0.0 0.0 - 0.06 K/UL    DF AUTOMATED     METABOLIC PANEL, COMPREHENSIVE    Collection Time: 07/18/17  9:15 PM   Result Value Ref Range    Sodium 140 136 - 145 mmol/L    Potassium 4.0 3.5 - 5.5 mmol/L    Chloride 106 100 - 108 mmol/L    CO2 24 21 - 32 mmol/L    Anion gap 10 3.0 - 18 mmol/L    Glucose 116 (H) 74 - 99 mg/dL    BUN 11 7.0 - 18 MG/DL    Creatinine 0.78 0.6 - 1.3 MG/DL    BUN/Creatinine ratio 14 12 - 20      GFR est AA >60 >60 ml/min/1.73m2    GFR est non-AA >60 >60 ml/min/1.73m2    Calcium 9.6 8.5 - 10.1 MG/DL    Bilirubin, total 1.0 0.2 - 1.0 MG/DL    ALT (SGPT) 26 13 - 56 U/L    AST (SGOT) 20 15 - 37 U/L    Alk.  phosphatase 87 45 - 117 U/L    Protein, total 7.3 6.4 - 8.2 g/dL    Albumin 3.2 (L) 3.4 - 5.0 g/dL    Globulin 4.1 (H) 2.0 - 4.0 g/dL    A-G Ratio 0.8 0.8 - 1.7     TROPONIN I    Collection Time: 07/18/17  9:15 PM   Result Value Ref Range    Troponin-I, Qt. 0.03 0.0 - 0.045 NG/ML   D DIMER    Collection Time: 07/18/17  9:15 PM   Result Value Ref Range    D DIMER 0.58 (H) <0.46 ug/ml(FEU)   NT-PRO BNP    Collection Time: 07/18/17  9:15 PM   Result Value Ref Range    NT pro-BNP 5308 (H) 0 - 900 PG/ML   LIPASE    Collection Time: 07/18/17  9:15 PM   Result Value Ref Range    Lipase 217 73 - 393 U/L   EKG, 12 LEAD, SUBSEQUENT    Collection Time: 07/19/17 12:55 AM   Result Value Ref Range    Ventricular Rate 74 BPM    Atrial Rate 74 BPM P-R Interval 172 ms    QRS Duration 194 ms    Q-T Interval 470 ms    QTC Calculation (Bezet) 521 ms    Calculated P Axis 86 degrees    Calculated R Axis -77 degrees    Calculated T Axis 87 degrees    Diagnosis       Atrial-sensed ventricular-paced rhythm with occasional premature ventricular   complexes  Abnormal ECG  When compared with ECG of 18-JUL-2017 19:39,  premature ventricular complexes are now present  Vent.  rate has decreased BY  22 BPM     TROPONIN I    Collection Time: 07/19/17  1:44 AM   Result Value Ref Range    Troponin-I, Qt. 0.03 0.0 - 0.045 NG/ML       Assessment/Plan     Principal Problem:    CHF (congestive heart failure) ([facility] (7/19/2017)    Active Problems:    Chest pain (7/19/2017)      Left leg swelling  HLD  HTN  Pacemaker  Elevated DDIMER   Obesity    PLAN:    Admit  Restrict fluid   Restrict Na < 2g/d  Lasix 40 mg IV every day  ECHO ordered  Trend troponin for CP  Cardiology consult in AM  PVL left leg ordered to r/o DVT  DVT Prophylaxis   Full code       Davida Hedrick MD  7/19/2017  12:26 AM

## 2017-07-19 NOTE — MANAGEMENT PLAN
[facility]/[address]   Discharge Planning/ Assessment    Reasons for Intervention:   Interviewed patient. Verified demographics listed on face sheet with patient; all information correct. Patient has [employer] [facility] [employer]. Patient stated their PCP is Dr Nadiya Jesus and last appt 2 months ago. She also sees Dr Ashlee Esquivel and Dr Nieves More regularly. Patient lives in private home with spouse. Patient's NOK is , Carlo Coker at [phone]. Patient independent with ADLs prior to admission. Pt works, drives and does all self care. DME prior to admission: CPAP.  Discharge plan is [address] Jaxon STARK BSN  Outcomes Manager  Pager # [phone]    High Risk Criteria  [] Yes  [x]No   Physician Referral  [] Yes  [x]No        Date    Nursing Referral  [] Yes  [x]No        Date    Patient/Family Request  [] Yes  [x]No        Date       Resources:    Medicare  [] Yes  [x]No   Medicaid  [] Yes  [x]No   No Resources  [] Yes  [x]No   Private Insurance  [x] Yes  []No    Name/Phone Number    Other  [] Yes  [x]No        (i.e. Workman's Comp)         Prior Services:    Prior Services  [] Yes  [x]No   Home Health  [] Yes  [x]No   [address]  [] Yes  [x]No        Number of [address]  [] Yes  [x]No       Meals on Wheels  [] Yes  [x]No   Office on Aging  [] Yes  [x]No   Transportation Services  [] Yes  [x]No   Nursing Home  [] Yes  [x]No        Nursing Home Name    [address]  [] Yes  [x]No        [address] Name    Other       Information Source:      Information obtained from  [x] Patient  [] Parent   [] [address]   [] Child  [] Spouse   [] Significant Other/Partner   [] Friend      [] EMS    [] Nursing Home Chart          [] Other:   Chart Review  [x] Yes  []No     Family/Support System:    Patient lives with  [] Alone    [x] Spouse   [] Significant Other  [] Children  [] Caretaker   [] Parent  [] Sibling     [] Other       Other Support System:    Is the patient responsible for care of others  [] Yes  [x]No   Information of person caring for patient on  discharge    Managers financial affairs independently  [x] Yes  []No   If no, explain:      Status Prior to Admission:    Mental Status  [x] Awake  [x] Alert  [x] Oriented  [] Quiet/Calm [] Lethargic/Sedated   [] Disoriented  [] Restless/Anxious  [] Combative   Personal Care  [] Dependent  [x] [address]  [] Requires Assistance   Meal Preparation Ability  [x] Independent   [] Standby Assistance   [] Minimal Assistance   [] Moderate Assistance  [] Maximum Assistance     [] Total Assistance   Chores  [x] Independent with Chores   [] N/A Nursing Home Resident   [] Requires Assistance   Bowel/Bladder  [x] Continent  [] Catheter  [] Incontinent  [] Ostomy Self-Care    [] Urine Diversion Self-Care  [] Maximum Assistance     [] Total Assistance   Number of Persons needed for assistance    DME at home  [] Harris Red  [] Katlyn Red   [] Commode    [] Bathroom/Grab Bars  [] Hospital Bed  [] Nebulizer  [] Oxygen           [] Raised Toilet Seat  [] Shower Chair  [] Side Rails for Bed   [] Tub Transfer Bench   [] Yevgeniy Alcantara  [] Amedeo Grave, Standard      [x] Other:CPAP   Vendor      Treatment Presently Receiving:    Current Treatments  [] Chemotherapy  [] Dialysis  [] Insulin  [] IVAB [x] IVF   [] O2  [] PCA   [] PT   [] RT   [] Tube Feedings   [] Wound Care     Psychosocial Evaluation:    Verbalized Knowledge of Disease Process  [] Patient  []Family   Coping with Disease Process  [] Patient  []Family   Requires Further Counseling Coping with Disease Process  [] Patient  []Family     Identified Projected Needs:    Home Health Aid  [] Yes  [x]No   Transportation  [] Yes  [x]No   Education  [] Yes  [x]No        Specific Education     Financial Counseling  [] Yes  [x]No   Inability to Care for Self/Will Require 24 hour care  [] Yes  [x]No   Pain Management  [] Yes  [x]No   Home Infusion Therapy  [] Yes  [x]No Oxygen Therapy  [] Yes  [x]No   DME  [] Yes  [x]No   Long Term Care Placement  [] Yes  [x]No   Rehab  [] Yes  [x]No   Physical Therapy  [] Yes  [x]No   Needs Anticipated At This Time  [] Yes  [x]No     Intra-Hospital Referral:    [address]  [] Yes  [x]No     [] Yes  [x]No   Patient Representative  [] Yes  [x]No   Staff for Teaching Needs  [] Yes  [x]No   Specialty Teaching Needs     Diabetic Educator  [] Yes  [x]No   Referral for Diabetic Educator Needed  [] Yes  [x]No  If Yes, place order for Nutritionist or Diabetic Consult     Tentative Discharge Plan:    Home with No Services  [x] Yes  []No   Home with [address]  [] Yes  [x]No        If Yes, specify type    [address]  [] Yes  [x]No        If Yes, specify type    Meals on Wheels  [] Yes  [x]No   Office of Aging  [] Yes  [x]No   NHP  [] Yes  [x]No   Return to the Nursing Home  [] Yes  [x]No   Rehab Therapy  [] Yes  [x]No   Acute Rehab  [] Yes  [x]No   Subacute Rehab  [] Yes  [x]No   Private Care  [] Yes  [x]No   Substance Abuse Referral  [] Yes  [x]No   Transportation  [] Yes  [x]No   Chore Service  [] Yes  [x]No   Inpatient Hospice  [] Yes  [x]No   OP RT  [] Yes  [x] No   OP Hemo  [] Yes  [x] No   OP PT  [] Yes  [x]No   Support Group  [] Yes  [x]No   Reach to Recovery  [] Yes  [x]No   OP Oncology Clinic  [] Yes  [x]No   Clinic Appointment  [] Yes  [x]No   DME  [] Yes  [x]No   Comments    Name of D/C Planner or  Given to Patient or Family Lissa Adams RN BSN  Outcomes Manager  Pager # [phone]   Phone Number         Extension    Date 7/19/2017   Time 1130   If you are discharged home, whom do you designate to participate in your discharge plan and receive any information needed?      Enter name of designee spouse        Phone # of designee         Address of designee         Updated         Patient refused to designate any           individual

## 2017-07-19 NOTE — PROGRESS NOTES
735 -- Bedside and Verbal shift change report given to Ashley Coombs (oncoming nurse) by Mounika Mccullough (offgoing nurse). Report included the following information SBAR, Kardex, Intake/Output, MAR, Accordion and Recent Results. Pt has been NPO for Duplex Lower Ext and 2D Echo.     944 -- Pt off unit for Duplex. 1046 -- Pt returned to unit. 1100 -- AM medications administered, will continue to monitor. 1130 -- Pt off unit for 2D Echo.     1330 -- Pt returned to unit. 1800 Fluid rest sign placed on whiteboard.      1400 -- Shift reassessment, pt condition unchanged, will continue to monitor.       1700-- Shift reassessment, pt condition unchanged, will continue to monitor.       1900 -- Bedside and Verbal shift change report given to Wilmer Taylor (oncoming nurse) by Ashley Coombs (offgoing nurse).  Report included the following information SBAR, Kardex, Intake/Output and MAR

## 2017-07-19 NOTE — CDMP QUERY
Pt admitted with Chf, Ht 5'2 with wt of 224 lbs calculating bmi at  39, for coding purposes please document in your progress notes the most current bmi .    =>Morbid Obesity with bmi of 41.0     (Ht 5'2 and wt of 224 lbs )   =>Other Explanation of clinical findings  =>Unable to Determine (no explanation of clinical findings)    The medical record reflects the following:    Risk:  obesity     Clinical Indicators:  ht 5'2, wt 224 lbs    Treatment:  dietary consult    Please clarify and document your clinical opinion in the progress notes and discharge summary including the definitive and/or presumptive diagnosis, (suspected or probable), related to the above clinical findings. Please include clinical findings supporting your diagnosis. If you DECLINE this query or would like to communicate with [employer], please utilize the \"[employer] message box\" at the TOP of the Progress Note on the right.       Thank you,  Jordy Ivy RN/CCDS    [phone]

## 2017-07-19 NOTE — ED NOTES
Patient is difficult IV stick. 3 nurses have looked at patient and 1 ed tech.  Patient has been stuck greater than 3 times

## 2017-07-19 NOTE — PROGRESS NOTES
1959  Received bedside verbal shift report. Pt lying in bed resting watching tv. 3lnc in place. piv # 22 to right hand intact piv# 20 to right wrist intact. Av paced on telemetry box # 11. Call bell within reach, side rails up x 3, bed low and locked. No complaints offered, pt instructed to call for assistance. 0013  Reassessment completed, no changes noted    0525  Reassessment completed , no changes noted. 0740 Bedside and Verbal shift change report given to benny hidalgo rn  (oncoming nurse) by Kyra Mullins rn  (offgoing nurse). Report included the following information SBAR, Kardex, Intake/Output, MAR, Recent Results and Cardiac Rhythm av paced.

## 2017-07-19 NOTE — PROGRESS NOTES
Left lower extremity venous duplex exam completed. Preliminary report: negative for deep venous thrombosis of left lower extremity. Worksheet to provide results is unavailable at this time.

## 2017-07-19 NOTE — ED PROVIDER NOTES
HPI Comments: Marcela Chávez is a 59 y.o. Female with c/o central, pressure like cp with sob for last day exacerbated with lying down, exertion. Nausea with some self induced vomiting, loose stools. Slight swelling left ankle. No fever. Cough with white sputum. No h/o chf, vte. No recent surgery, active cancer, immobilization. Intermittent wheezing with h/o rad    The history is provided by the patient. Past Medical History:   Diagnosis Date    Atrial flutter     CHADS score 1  (-CHF, +HTN, -AGE, -DM, -CVA)    Atrial flutter ablation     3/13    Dyslipidemia     Hypertension     Obstructive sleep apnea     CPAP    Pacemaker     3/13 ([employer])    Symptomatic bradycardia        Past Surgical History:   Procedure Laterality Date    HX HYSTERECTOMY      ? years ago   [place] HX OOPHORECTOMY           Family History:   Problem Relation Age of Onset    Breast Cancer Sister 59       Social History     Social History    Marital status:      Spouse name: N/A    Number of children: N/A    Years of education: N/A     Occupational History    Not on file. Social History Main Topics    Smoking status: Never Smoker    Smokeless tobacco: Never Used    Alcohol use Yes    Drug use: No    Sexual activity: Not on file     Other Topics Concern    Not on file     Social History Narrative         ALLERGIES: Review of patient's allergies indicates no known allergies. Review of Systems   Constitutional: Negative for fever. HENT: Negative for sore throat. Eyes: Negative for visual disturbance. Respiratory: Positive for cough, chest tightness, shortness of breath and wheezing. Cardiovascular: Positive for chest pain. Negative for palpitations. Gastrointestinal: Negative for abdominal pain and constipation. Endocrine: Negative for polyuria. Genitourinary: Negative for difficulty urinating. Musculoskeletal: Negative for gait problem. Skin: Negative for rash.    Neurological: Negative for syncope. Hematological: Does not bruise/bleed easily. Psychiatric/Behavioral: Positive for sleep disturbance. Vitals:    07/18/17 2245 07/19/17 0042 07/19/17 0044 07/19/17 0050   BP:  141/64  126/70   Pulse: 93  79 77   Resp: 26  23 26   Temp:       SpO2: 91%  94% 90%   Weight:                Physical Exam   Constitutional: She is oriented to person, place, and time. She appears well-developed and well-nourished. She appears distressed (appears mildly uncomfortable). HENT:   Head: Normocephalic and atraumatic. Right Ear: External ear normal.   Left Ear: External ear normal.   Nose: Nose normal.   Mouth/Throat: Uvula is midline, oropharynx is clear and moist and mucous membranes are normal.   Eyes: Conjunctivae are normal. No scleral icterus. Neck: Neck supple. Cardiovascular: Normal rate, regular rhythm, normal heart sounds and intact distal pulses. Pulmonary/Chest: Effort normal and breath sounds normal. She exhibits tenderness. Abdominal: Soft. Normal appearance. She exhibits no distension and no mass. There is no hepatosplenomegaly. There is tenderness. There is no rigidity, no rebound, no guarding and no CVA tenderness. Musculoskeletal: She exhibits no edema. Neurological: She is alert and oriented to person, place, and time. Gait normal.   Skin: Skin is warm and dry. She is not diaphoretic. Psychiatric: Her behavior is normal.   Nursing note and vitals reviewed.        [facility]  ED Course       Procedures  Vitals:  Patient Vitals for the past 12 hrs:   Temp Pulse Resp BP SpO2   07/19/17 0050 - 77 26 126/70 90 %   07/19/17 0044 - 79 23 - 94 %   07/19/17 0042 - - - 141/64 -   07/18/17 2245 - 93 26 - 91 %   07/18/17 2230 - 95 25 - 96 %   07/18/17 2221 - 86 - 144/62 -   07/18/17 2215 - 91 22 144/62 (!) 79 %   07/18/17 2200 - 68 23 139/59 100 %   07/18/17 2148 - 71 - 153/79 -   07/18/17 2145 - 71 26 164/66 98 %   07/18/17 2130 - 68 29 (!) 129/107 96 %   07/18/17 2116 - 58 30 145/83 97 % 07/18/17 2100 - 76 27 - 98 %   07/18/17 2045 - 76 27 152/65 -   07/18/17 2030 - 92 26 172/71 95 %   07/18/17 1945 - - - 170/67 93 %   07/18/17 1944 98.6 °F (37 °C) 96 16 161/83 -         Medications ordered:   Medications   LORazepam (ATIVAN) 2 mg/mL injection (not administered)   morphine injection 2 mg (2 mg IntraVENous Given 7/18/17 2146)   ondansetron (ZOFRAN) injection 4 mg (4 mg IntraVENous Given 7/18/17 2145)   famotidine (PF) (PEPCID) injection 20 mg (20 mg IntraVENous Given 7/18/17 2150)   albuterol-ipratropium (DUO-NEB) 2.5 MG-0.5 MG/3 ML (3 mL Nebulization Given 7/18/17 2151)   furosemide (LASIX) injection 40 mg (40 mg IntraVENous Given 7/18/17 2148)   nitroglycerin (NITROBID) 2 % ointment 1 Inch (1 Inch Topical Given 7/18/17 2221)   morphine injection 4 mg (4 mg IntraVENous Given 7/18/17 2327)   technetium pentetate (Tc-DTPA) injection 1 millicurie (1 millicurie Inhalation Given 7/18/17 2339)   technetium albumin aggregated (MAA) solution 5.6 millicurie (5.6 millicuries IntraVENous Given 7/19/17 0000)         Lab findings:  Recent Results (from the past 12 hour(s))   EKG, 12 LEAD, INITIAL    Collection Time: 07/18/17  7:39 PM   Result Value Ref Range    Ventricular Rate 96 BPM    Atrial Rate 96 BPM    P-R Interval 192 ms    QRS Duration 178 ms    Q-T Interval 404 ms    QTC Calculation (Bezet) 510 ms    Calculated P Axis 93 degrees    Calculated R Axis -76 degrees    Calculated T Axis 91 degrees    Diagnosis       Electronic ventricular pacemaker  When compared with ECG of 06-MAR-2013 20:39,  Electronic ventricular pacemaker has replaced Sinus rhythm  Vent.  rate has increased BY  44 BPM     CBC WITH AUTOMATED DIFF    Collection Time: 07/18/17  9:15 PM   Result Value Ref Range    WBC 13.0 4.6 - 13.2 K/uL    RBC 4.30 4.20 - 5.30 M/uL    HGB 13.4 12.0 - 16.0 g/dL    HCT 39.2 35.0 - 45.0 %    MCV 91.2 74.0 - 97.0 FL    MCH 31.2 24.0 - 34.0 PG    MCHC 34.2 31.0 - 37.0 g/dL    RDW 13.8 11.6 - 14.5 % PLATELET 479 799 - 082 K/uL    MPV 11.6 9.2 - 11.8 FL    NEUTROPHILS 80 (H) 40 - 73 %    LYMPHOCYTES 10 (L) 21 - 52 %    MONOCYTES 9 3 - 10 %    EOSINOPHILS 1 0 - 5 %    BASOPHILS 0 0 - 2 %    ABS. NEUTROPHILS 10.4 (H) 1.8 - 8.0 K/UL    ABS. LYMPHOCYTES 1.3 0.9 - 3.6 K/UL    ABS. MONOCYTES 1.2 0.05 - 1.2 K/UL    ABS. EOSINOPHILS 0.1 0.0 - 0.4 K/UL    ABS. BASOPHILS 0.0 0.0 - 0.06 K/UL    DF AUTOMATED     METABOLIC PANEL, COMPREHENSIVE    Collection Time: 07/18/17  9:15 PM   Result Value Ref Range    Sodium 140 136 - 145 mmol/L    Potassium 4.0 3.5 - 5.5 mmol/L    Chloride 106 100 - 108 mmol/L    CO2 24 21 - 32 mmol/L    Anion gap 10 3.0 - 18 mmol/L    Glucose 116 (H) 74 - 99 mg/dL    BUN 11 7.0 - 18 MG/DL    Creatinine 0.78 0.6 - 1.3 MG/DL    BUN/Creatinine ratio 14 12 - 20      GFR est AA >60 >60 ml/min/1.73m2    GFR est non-AA >60 >60 ml/min/1.73m2    Calcium 9.6 8.5 - 10.1 MG/DL    Bilirubin, total 1.0 0.2 - 1.0 MG/DL    ALT (SGPT) 26 13 - 56 U/L    AST (SGOT) 20 15 - 37 U/L    Alk.  phosphatase 87 45 - 117 U/L    Protein, total 7.3 6.4 - 8.2 g/dL    Albumin 3.2 (L) 3.4 - 5.0 g/dL    Globulin 4.1 (H) 2.0 - 4.0 g/dL    A-G Ratio 0.8 0.8 - 1.7     TROPONIN I    Collection Time: 07/18/17  9:15 PM   Result Value Ref Range    Troponin-I, Qt. 0.03 0.0 - 0.045 NG/ML   D DIMER    Collection Time: 07/18/17  9:15 PM   Result Value Ref Range    D DIMER 0.58 (H) <0.46 ug/ml(FEU)   NT-PRO BNP    Collection Time: 07/18/17  9:15 PM   Result Value Ref Range    NT pro-BNP 5308 (H) 0 - 900 PG/ML   LIPASE    Collection Time: 07/18/17  9:15 PM   Result Value Ref Range    Lipase 217 73 - 393 U/L   EKG, 12 LEAD, SUBSEQUENT    Collection Time: 07/19/17 12:55 AM   Result Value Ref Range    Ventricular Rate 74 BPM    Atrial Rate 74 BPM    P-R Interval 172 ms    QRS Duration 194 ms    Q-T Interval 470 ms    QTC Calculation (Bezet) 521 ms    Calculated P Axis 86 degrees    Calculated R Axis -77 degrees    Calculated T Axis 87 degrees Diagnosis       Atrial-sensed ventricular-paced rhythm with occasional premature ventricular   complexes  Abnormal ECG  When compared with ECG of 18-JUL-2017 19:39,  premature ventricular complexes are now present  Vent. rate has decreased BY  22 BPM     TROPONIN I    Collection Time: 07/19/17  1:44 AM   Result Value Ref Range    Troponin-I, Qt. 0.03 0.0 - 0.045 NG/ML       EKG interpretation by ED Physician:  Pacemaker with no acute st tw changes indicative of ischemia  Rate 96, qtc 510  Pacemaker rhythm now present vs previous    X-Ray, CT or other radiology findings or impressions:  XR CHEST PORT    (Results Pending)   NM LUNG PERFUSION W VENT    (Results Pending)   cardiomegaly with interstitial edema  Low prob for pe  Progress notes, Consult notes or additional Procedure notes:   Feeling somewhat better; acute chf likely etiology which was d/w pt  Along with need for admission  D/w dr Mau Monterroso who will admit    ED Critical Care Note    System at risk for life threatening failure: cardiac, pulm, renal, neuro  Associated problems: elevated bnp, hyperglycemia, elevated dimer    Critical Care services provided: bedside management, consult, documentation  Excluded procedures (time not included in critical care): ecg interp    Total Critical Care Time (in minutes) 48          Reevaluation of patient:   Stable for admission    Disposition:  Diagnosis:   1. Acute diastolic congestive heart failure ([address]    2. Acute chest pain        Disposition: admit      Follow-up Information     None            Patient's Medications   Start Taking    No medications on file   Continue Taking    ASPIRIN 81 MG CHEWABLE TABLET    Take 1 Tab by mouth daily. ATORVASTATIN (LIPITOR) 10 MG TABLET        BUPROPION XL (WELLBUTRIN XL) 150 MG TABLET    Take 150 mg by mouth every morning. CALCIUM PO    Take 1,200 mg by mouth daily. CHOLECALCIFEROL (VITAMIN D3) 1,000 UNIT TABLET    Take  by mouth daily.       DESVENLAFAXINE SR (PRISTIQ) 50 MG TABLET    Take 50 mg by mouth daily. Indications: GENERALIZED ANXIETY DISORDER    HYDROCHLOROTHIAZIDE (HYDRODIURIL) 25 MG TABLET    Take 1 Tab by mouth daily. LOSARTAN (COZAAR) 25 MG TABLET    take 1 tablet by mouth once daily    MULTIVITAMIN, STRESS FORMULA (STRESS TAB) TABLET    Take 1 Tab by mouth daily. OMEGA-3 FATTY ACIDS-VITAMIN E (FISH OIL) 1,000 MG CAP    Take 1 Cap by mouth.      These Medications have changed    No medications on file   Stop Taking    No medications on file

## 2017-07-19 NOTE — ACP (ADVANCE CARE PLANNING)
Patient has designated __spouse______________________ to participate in his/her discharge plan and to receive any needed information.      Name: Karen Huntley as pt  Phone Harinder Lance

## 2017-07-20 PROBLEM — J21.9 BRONCHIOLITIS: Status: ACTIVE | Noted: 2017-07-20

## 2017-07-20 PROBLEM — I50.23 ACUTE ON CHRONIC SYSTOLIC CONGESTIVE HEART FAILURE (HCC): Status: ACTIVE | Noted: 2017-07-20

## 2017-07-20 LAB
ALBUMIN SERPL BCP-MCNC: 3.2 G/DL (ref 3.4–5)
ALBUMIN/GLOB SERPL: 0.8 {RATIO} (ref 0.8–1.7)
ALP SERPL-CCNC: 85 U/L (ref 45–117)
ALT SERPL-CCNC: 28 U/L (ref 13–56)
ANION GAP BLD CALC-SCNC: 7 MMOL/L (ref 3–18)
AST SERPL W P-5'-P-CCNC: 19 U/L (ref 15–37)
BASOPHILS # BLD AUTO: 0 K/UL (ref 0–0.06)
BASOPHILS # BLD: 0 % (ref 0–2)
BILIRUB SERPL-MCNC: 0.7 MG/DL (ref 0.2–1)
BUN SERPL-MCNC: 15 MG/DL (ref 7–18)
BUN/CREAT SERPL: 16 (ref 12–20)
CALCIUM SERPL-MCNC: 9.2 MG/DL (ref 8.5–10.1)
CHLORIDE SERPL-SCNC: 102 MMOL/L (ref 100–108)
CO2 SERPL-SCNC: 31 MMOL/L (ref 21–32)
CREAT SERPL-MCNC: 0.96 MG/DL (ref 0.6–1.3)
DIFFERENTIAL METHOD BLD: ABNORMAL
EOSINOPHIL # BLD: 0.2 K/UL (ref 0–0.4)
EOSINOPHIL NFR BLD: 2 % (ref 0–5)
ERYTHROCYTE [DISTWIDTH] IN BLOOD BY AUTOMATED COUNT: 13.8 % (ref 11.6–14.5)
GLOBULIN SER CALC-MCNC: 4.1 G/DL (ref 2–4)
GLUCOSE SERPL-MCNC: 104 MG/DL (ref 74–99)
HCT VFR BLD AUTO: 39.8 % (ref 35–45)
HGB BLD-MCNC: 13 G/DL (ref 12–16)
INR PPP: 1.1 (ref 0.8–1.2)
LYMPHOCYTES # BLD AUTO: 18 % (ref 21–52)
LYMPHOCYTES # BLD: 1.6 K/UL (ref 0.9–3.6)
MCH RBC QN AUTO: 30.7 PG (ref 24–34)
MCHC RBC AUTO-ENTMCNC: 32.7 G/DL (ref 31–37)
MCV RBC AUTO: 93.9 FL (ref 74–97)
MONOCYTES # BLD: 1.3 K/UL (ref 0.05–1.2)
MONOCYTES NFR BLD AUTO: 15 % (ref 3–10)
NEUTS SEG # BLD: 5.6 K/UL (ref 1.8–8)
NEUTS SEG NFR BLD AUTO: 65 % (ref 40–73)
PLATELET # BLD AUTO: 214 K/UL (ref 135–420)
PMV BLD AUTO: 11.8 FL (ref 9.2–11.8)
POTASSIUM SERPL-SCNC: 4.9 MMOL/L (ref 3.5–5.5)
PROT SERPL-MCNC: 7.3 G/DL (ref 6.4–8.2)
PROTHROMBIN TIME: 13.7 SEC (ref 11.5–15.2)
RBC # BLD AUTO: 4.24 M/UL (ref 4.2–5.3)
SODIUM SERPL-SCNC: 140 MMOL/L (ref 136–145)
WBC # BLD AUTO: 8.7 K/UL (ref 4.6–13.2)

## 2017-07-20 PROCEDURE — 96372 THER/PROPH/DIAG INJ SC/IM: CPT

## 2017-07-20 PROCEDURE — 85025 COMPLETE CBC W/AUTO DIFF WBC: CPT | Performed by: FAMILY MEDICINE

## 2017-07-20 PROCEDURE — 74011250636 HC RX REV CODE- 250/636: Performed by: FAMILY MEDICINE

## 2017-07-20 PROCEDURE — 96375 TX/PRO/DX INJ NEW DRUG ADDON: CPT

## 2017-07-20 PROCEDURE — 74011000250 HC RX REV CODE- 250: Performed by: HOSPITALIST

## 2017-07-20 PROCEDURE — 65660000000 HC RM CCU STEPDOWN

## 2017-07-20 PROCEDURE — 85610 PROTHROMBIN TIME: CPT | Performed by: FAMILY MEDICINE

## 2017-07-20 PROCEDURE — 74011250637 HC RX REV CODE- 250/637: Performed by: INTERNAL MEDICINE

## 2017-07-20 PROCEDURE — 96376 TX/PRO/DX INJ SAME DRUG ADON: CPT

## 2017-07-20 PROCEDURE — 80053 COMPREHEN METABOLIC PANEL: CPT | Performed by: FAMILY MEDICINE

## 2017-07-20 PROCEDURE — 74011250637 HC RX REV CODE- 250/637: Performed by: FAMILY MEDICINE

## 2017-07-20 PROCEDURE — 99218 HC RM OBSERVATION: CPT

## 2017-07-20 PROCEDURE — 36415 COLL VENOUS BLD VENIPUNCTURE: CPT | Performed by: FAMILY MEDICINE

## 2017-07-20 PROCEDURE — 74011250636 HC RX REV CODE- 250/636: Performed by: INTERNAL MEDICINE

## 2017-07-20 RX ORDER — BUMETANIDE 0.25 MG/ML
1 INJECTION INTRAMUSCULAR; INTRAVENOUS 2 TIMES DAILY
Status: COMPLETED | OUTPATIENT
Start: 2017-07-20 | End: 2017-07-20

## 2017-07-20 RX ORDER — BUMETANIDE 1 MG/1
1 TABLET ORAL DAILY
Status: DISCONTINUED | OUTPATIENT
Start: 2017-07-21 | End: 2017-07-21 | Stop reason: HOSPADM

## 2017-07-20 RX ADMIN — Medication 10 ML: at 07:34

## 2017-07-20 RX ADMIN — ASPIRIN 81 MG 81 MG: 81 TABLET ORAL at 08:21

## 2017-07-20 RX ADMIN — CARVEDILOL 3.12 MG: 3.12 TABLET, FILM COATED ORAL at 17:58

## 2017-07-20 RX ADMIN — Medication 10 ML: at 21:55

## 2017-07-20 RX ADMIN — BUMETANIDE 1 MG: 0.25 INJECTION INTRAMUSCULAR; INTRAVENOUS at 14:59

## 2017-07-20 RX ADMIN — LOSARTAN POTASSIUM 25 MG: 50 TABLET ORAL at 08:20

## 2017-07-20 RX ADMIN — ATORVASTATIN CALCIUM 10 MG: 20 TABLET, FILM COATED ORAL at 08:21

## 2017-07-20 RX ADMIN — BUPROPION HYDROCHLORIDE 150 MG: 150 TABLET, FILM COATED, EXTENDED RELEASE ORAL at 08:21

## 2017-07-20 RX ADMIN — Medication 10 ML: at 13:10

## 2017-07-20 RX ADMIN — BUMETANIDE 1 MG: 0.25 INJECTION INTRAMUSCULAR; INTRAVENOUS at 21:58

## 2017-07-20 RX ADMIN — DESVENLAFAXINE SUCCINATE 50 MG: 50 TABLET, EXTENDED RELEASE ORAL at 12:54

## 2017-07-20 RX ADMIN — CARVEDILOL 3.12 MG: 3.12 TABLET, FILM COATED ORAL at 08:21

## 2017-07-20 RX ADMIN — ENOXAPARIN SODIUM 40 MG: 40 INJECTION SUBCUTANEOUS at 08:20

## 2017-07-20 RX ADMIN — FUROSEMIDE 40 MG: 10 INJECTION, SOLUTION INTRAMUSCULAR; INTRAVENOUS at 08:20

## 2017-07-20 NOTE — PROGRESS NOTES
Progress Note      Patient: Garland Reed               Sex: female          DOA: 7/18/2017       YOB: 1955      Age:  59 y.o.        LOS:  LOS: 1 day               Subjective:   Acute on chronic systolic HF on IV bumex, appreciate cards, continue 2 more IV doses then oral in am      Objective:      Visit Vitals    /67    Pulse 64    Temp 98.2 °F (36.8 °C)    Resp 14    Ht 5' 2\" (1.575 m)    Wt 95.5 kg (210 lb 8 oz)    SpO2 98%    Breastfeeding No    BMI 38.5 kg/m2       Physical Exam:  Pt is alert and denies dyspnea at rest .discussed with herfqamily   Heart reg rate an rhythm   Lungs good breath sounds heard   abdomen soft and no pain on plapation   Neuro nonfocal      Lab/Data Reviewed:  CMP:   Lab Results   Component Value Date/Time     07/20/2017 04:40 AM    K 4.9 07/20/2017 04:40 AM     07/20/2017 04:40 AM    CO2 31 07/20/2017 04:40 AM    AGAP 7 07/20/2017 04:40 AM     (H) 07/20/2017 04:40 AM    BUN 15 07/20/2017 04:40 AM    CREA 0.96 07/20/2017 04:40 AM    GFRAA >60 07/20/2017 04:40 AM    GFRNA 59 (L) 07/20/2017 04:40 AM    CA 9.2 07/20/2017 04:40 AM    ALB 3.2 (L) 07/20/2017 04:40 AM    TP 7.3 07/20/2017 04:40 AM    GLOB 4.1 (H) 07/20/2017 04:40 AM    AGRAT 0.8 07/20/2017 04:40 AM    SGOT 19 07/20/2017 04:40 AM    ALT 28 07/20/2017 04:40 AM     CBC:   Lab Results   Component Value Date/Time    WBC 8.7 07/20/2017 04:40 AM    HGB 13.0 07/20/2017 04:40 AM    HCT 39.8 07/20/2017 04:40 AM     07/20/2017 04:40 AM     All Cardiac Markers in the last 24 hours:   No results found for: CPK, CKMMB, CKMB, RCK3, CKMBT, CKNDX, CKND1, DEREK, TROPT, TROIQ, ALIZA, TROPT, TNIPOC, BNP, BNPP        Assessment/Plan     Principal Problem:    CHF (congestive heart failure) ([address] (7/19/2017 pt has acute systolic chf wuith an ef of 30 %    Active Problems:    Chest pain (7/19/2017)  Atrial flutter ablation  S/p pacemaker   chads score of 1   htn  Sleep apnea the patient uses a cpap      Plan: DC tomorrow on lasix, Wean 02 off between now and then

## 2017-07-20 NOTE — PROGRESS NOTES
conducted an initial consultation and Spiritual Assessment for Cheryle Brier, who is a 59 y.o.,female. Patients Primary Language is: [place]. According to the patients EMR Catholic Affiliation is: Silvia Oropeza.     The reason the Patient came to the hospital is:   Patient Active Problem List    Diagnosis Date Noted    Acute on chronic systolic congestive heart failure ([facility] [facility] 07/20/2017    Bronchiolitis 07/20/2017    Chest pain 07/19/2017    Cardiac pacemaker in situ 02/25/2016    Hypertension     Dyslipidemia     Atrial flutter      Symptomatic bradycardia         The  provided the following Interventions:  Initiated a relationship of care and support with patient setting on her bed in 3011 this afternoon  Listened empathically as patient talked about being here and the care that she has received. Feels so much better than when she came in. Provided information about Spiritual Care Services and the advance directive information as well. Offered prayer and assurance of continued prayers on patients behalf. Patient will do an advance directive form later. The following outcomes were achieved:  Patient shared limited information about her medical narrative and spiritual journey/beliefs. Patient processed feeling about current hospitalization. Patient expressed gratitude for pastoral care visit. Assessment:  Patient does not have any Roman Catholic/cultural needs that will affect patients preferences in health care. There are no further spiritual or Roman Catholic issues which require Spiritual Care Services interventions at this time. Plan:  Chaplains will continue to follow and will provide pastoral care on an as needed/requested basis    . [facility]   [phone]

## 2017-07-20 NOTE — PROGRESS NOTES
[facility] - Progress Note  Admit Date: 7/18/2017    Assessment:     Hospital Problems  Date Reviewed: 12/19/2016          Codes Class Noted POA    Acute on chronic systolic congestive heart failure [facility]), will have pacemaker interrogated tomorrow. Would continue BB, ARB, and diuretic. Will uptitrate as tolerated. Will repeat CXR and BNP in AM. ICD-10-CM: I50.23  ICD-9-CM: 428.23, 428.0  7/20/2017 Unknown        Bronchiolitis ICD-10-CM: J21.9  ICD-9-CM: 466.19  7/20/2017 Unknown        Chest pain ICD-10-CM: R07.9  ICD-9-CM: 786.50  7/19/2017 Unknown        Cardiac pacemaker in situ, will have interrogated in AM ICD-10-CM: Z95.0  ICD-9-CM: V45.01  2/25/2016 Yes        Hypertension, stable ICD-10-CM: I11.9  ICD-9-CM: 402.10  Unknown Yes        Dyslipidemia ICD-10-CM: E78.5  ICD-9-CM: 272.4  Unknown Yes        Atrial flutter  ICD-10-CM: I48.92  ICD-9-CM: 427.32  Unknown Yes    Overview Addendum 7/20/2017  2:48 PM by Sandra Aguayo MD     CHADS score 1  (-CHF, +HTN, -AGE, -DM, -CVA), S/P Ablation                   Plan: Will check Pacer. Repeat CXR and labs    Subjective:     No new complaints.      Objective:      Patient Vitals for the past 8 hrs:   Temp Pulse Resp BP SpO2   07/20/17 1326 98.2 °F (36.8 °C) 64 14 126/67 98 %   07/20/17 1014 97.9 °F (36.6 °C) 65 16 123/70 96 %         Patient Vitals for the past 96 hrs:   Weight   07/20/17 0643 210 lb 8 oz (95.5 kg)   07/19/17 0250 224 lb 6.4 oz (101.8 kg)   07/18/17 1944 225 lb (102.1 kg)                    Intake/Output Summary (Last 24 hours) at 07/20/17 1449  Last data filed at 07/20/17 1328   Gross per 24 hour   Intake             1228 ml   Output             2300 ml   Net            -1072 ml       Medications  Current Facility-Administered Medications   Medication Dose Route Frequency    bumetanide (BUMEX) injection 1 mg  1 mg IntraVENous BID    [START ON 7/21/2017] bumetanide (BUMEX) tablet 1 mg  1 mg Oral DAILY    sodium chloride (NS) flush 5-10 mL  5-10 mL IntraVENous Q8H    sodium chloride (NS) flush 5-10 mL  5-10 mL IntraVENous PRN    morphine injection 2 mg  2 mg IntraVENous Q4H PRN    enoxaparin (LOVENOX) injection 40 mg  40 mg SubCUTAneous Q24H    aspirin chewable tablet 81 mg  81 mg Oral DAILY    atorvastatin (LIPITOR) tablet 10 mg  10 mg Oral DAILY    buPROPion XL (WELLBUTRIN XL) tablet 150 mg  150 mg Oral 7am    desvenlafaxine succinate (PRISTIQ) ER tablet 50 mg  50 mg Oral DAILY    losartan (COZAAR) tablet 25 mg  25 mg Oral DAILY    ondansetron (ZOFRAN) injection 4 mg  4 mg IntraVENous Q6H PRN    carvedilol (COREG) tablet 3.125 mg  3.125 mg Oral BID WITH MEALS         Physical Exam:  General:  alert, cooperative, no distress  Neck:  no JVD  Lungs:  clear to auscultation bilaterally  Heart:  regular rate and rhythm  Abdomen:  abdomen is soft without significant tenderness, masses, organomegaly or guarding  Extremities:  no edema    Data Review:     Labs: Results:       Chemistry Recent Labs      07/20/17 0440  07/19/17   1120  07/18/17 2115   GLU  104*  106*  116*   NA  140  138  140   K  4.9  4.0  4.0   CL  102  102  106   CO2  31  28  24   BUN  15  16  11   CREA  0.96  0.84  0.78   CA  9.2  9.1  9.6   AGAP  7  8  10   BUCR  16  19  14   AP  85   --   87   TP  7.3   --   7.3   ALB  3.2*   --   3.2*   GLOB  4.1*   --   4.1*   AGRAT  0.8   --   0.8      CBC w/Diff Recent Labs      07/20/17 0440  07/19/17   1120  07/18/17 2115   WBC  8.7  12.2  13.0   RBC  4.24  3.98*  4.30   HGB  13.0  12.2  13.4   HCT  39.8  37.0  39.2   PLT  214  192  162   GRANS  65  80*  80*   LYMPH  18*  10*  10*   EOS  2  0  1      Cardiac Enzymes No results found for: CPK, CKMMB, CKMB, RCK3, CKMBT, CKNDX, CKND1, DEREK, TROPT, TROIQ, ALIZA, TROPT, TNIPOC, BNP, BNPP   Coagulation Recent Labs      07/20/17   0440   PTP  13.7   INR  1.1       Lipid Panel Lab Results   Component Value Date/Time    Cholesterol, total 222 07/12/2010 08:49 AM    HDL Cholesterol 51 07/12/2010 08:49 AM    LDL, calculated 150 07/12/2010 08:49 AM    VLDL, calculated 21 07/12/2010 08:49 AM    Triglyceride 105 07/12/2010 08:49 AM    CHOL/HDL Ratio 4.4 07/12/2010 08:49 AM      BNP No results found for: BNP, BNPP, XBNPT   Liver Enzymes Recent Labs      07/20/17   0440   TP  7.3   ALB  3.2*   AP  85   SGOT  19      Digoxin    Thyroid Studies Lab Results   Component Value Date/Time    TSH 3.54 01/15/2013 12:12 AM          Signed By: Renee Shipman MD     July 20, 2017

## 2017-07-21 ENCOUNTER — APPOINTMENT (OUTPATIENT)
Dept: GENERAL RADIOLOGY | Age: 62
End: 2017-07-21
Attending: INTERNAL MEDICINE
Payer: COMMERCIAL

## 2017-07-21 VITALS
HEIGHT: 62 IN | DIASTOLIC BLOOD PRESSURE: 68 MMHG | HEART RATE: 63 BPM | BODY MASS INDEX: 38.11 KG/M2 | RESPIRATION RATE: 16 BRPM | TEMPERATURE: 97.9 F | OXYGEN SATURATION: 97 % | SYSTOLIC BLOOD PRESSURE: 135 MMHG | WEIGHT: 207.1 LBS

## 2017-07-21 LAB — BNP SERPL-MCNC: 398 PG/ML (ref 0–900)

## 2017-07-21 PROCEDURE — 99218 HC RM OBSERVATION: CPT

## 2017-07-21 PROCEDURE — 71020 XR CHEST PA LAT: CPT

## 2017-07-21 PROCEDURE — 36415 COLL VENOUS BLD VENIPUNCTURE: CPT | Performed by: INTERNAL MEDICINE

## 2017-07-21 PROCEDURE — 74011250636 HC RX REV CODE- 250/636: Performed by: FAMILY MEDICINE

## 2017-07-21 PROCEDURE — 96372 THER/PROPH/DIAG INJ SC/IM: CPT

## 2017-07-21 PROCEDURE — 74011250637 HC RX REV CODE- 250/637: Performed by: HOSPITALIST

## 2017-07-21 PROCEDURE — 74011250637 HC RX REV CODE- 250/637: Performed by: INTERNAL MEDICINE

## 2017-07-21 PROCEDURE — 83880 ASSAY OF NATRIURETIC PEPTIDE: CPT | Performed by: INTERNAL MEDICINE

## 2017-07-21 PROCEDURE — 74011250637 HC RX REV CODE- 250/637: Performed by: FAMILY MEDICINE

## 2017-07-21 RX ORDER — CARVEDILOL 6.25 MG/1
6.25 TABLET ORAL 2 TIMES DAILY WITH MEALS
Qty: 60 TAB | Refills: 0 | Status: SHIPPED | OUTPATIENT
Start: 2017-07-21 | End: 2017-08-14 | Stop reason: SDUPTHER

## 2017-07-21 RX ORDER — BUMETANIDE 1 MG/1
1 TABLET ORAL DAILY
Qty: 30 TAB | Refills: 0 | Status: SHIPPED | OUTPATIENT
Start: 2017-07-21 | End: 2017-08-14 | Stop reason: SDUPTHER

## 2017-07-21 RX ORDER — CARVEDILOL 6.25 MG/1
6.25 TABLET ORAL 2 TIMES DAILY WITH MEALS
Status: DISCONTINUED | OUTPATIENT
Start: 2017-07-21 | End: 2017-07-21 | Stop reason: HOSPADM

## 2017-07-21 RX ADMIN — ATORVASTATIN CALCIUM 10 MG: 20 TABLET, FILM COATED ORAL at 09:05

## 2017-07-21 RX ADMIN — Medication 10 ML: at 06:54

## 2017-07-21 RX ADMIN — ENOXAPARIN SODIUM 40 MG: 40 INJECTION SUBCUTANEOUS at 09:06

## 2017-07-21 RX ADMIN — CARVEDILOL 3.12 MG: 3.12 TABLET, FILM COATED ORAL at 09:05

## 2017-07-21 RX ADMIN — ASPIRIN 81 MG 81 MG: 81 TABLET ORAL at 09:05

## 2017-07-21 RX ADMIN — LOSARTAN POTASSIUM 25 MG: 50 TABLET ORAL at 09:05

## 2017-07-21 RX ADMIN — BUPROPION HYDROCHLORIDE 150 MG: 150 TABLET, FILM COATED, EXTENDED RELEASE ORAL at 09:05

## 2017-07-21 RX ADMIN — BUMETANIDE 1 MG: 1 TABLET ORAL at 09:05

## 2017-07-21 NOTE — ROUTINE PROCESS
0715 Report received from [facility], [facility]. Patient alert and oriented. Denies any pain. 0800 Patient off the floor to Radiology for Chest X Ray.  0900 Patient back in the room from radiology. Meds given. 1149 Patient for discharge today.

## 2017-07-21 NOTE — PROGRESS NOTES
2015  Received bedside verbal shift report. Pt lying in bed resting watching tv. 2lnc in place. piv # 22 to right hand intact piv# 20 to right wrist intact. Av paced on telemetry box # 11. Call bell within reach, side rails up x 3, bed low and locked. No complaints offered, pt instructed to call for assistance. 2120  Heart failure teaching booklet and low sodium diet teaching sheets given at this time.      0017  Reassessment completed, no changes noted    0406  Reassessment completed , no changes noted.     0713  Bedside and Verbal shift change report given to 4500 Delano Myles  (oncoming nurse) by Denilson Pickering rn  (offgoing nurse).  Report included the following information SBAR, Kardex, Intake/Output, MAR, Recent Results and Cardiac Rhythm av paced

## 2017-07-21 NOTE — PROGRESS NOTES
[facility] - Progress Note  Admit Date: 7/18/2017    Assessment:     Hospital Problems  Date Reviewed: 12/19/2016          Codes Class Noted POA    Acute on chronic systolic congestive heart failure ([facility] [address] CXR shows resolution of failure and BNP normalized. OK for DC from cardiac standpoint, and can see in office in 10 days. ( will arrange ) ICD-10-CM: I50.23  ICD-9-CM: 428.23, 428.0  7/20/2017 Unknown        Bronchiolitis ICD-10-CM: J21.9  ICD-9-CM: 466.19  7/20/2017 Unknown        Chest pain ICD-10-CM: R07.9  ICD-9-CM: 786.50  7/19/2017 Unknown        Cardiac pacemaker in situ ICD-10-CM: Z95.0  ICD-9-CM: V45.01  2/25/2016 Yes        Hypertension ICD-10-CM: I11.9  ICD-9-CM: 402.10  Unknown Yes        Dyslipidemia ICD-10-CM: E78.5  ICD-9-CM: 272.4  Unknown Yes        Atrial flutter  ICD-10-CM: I48.92  ICD-9-CM: 427.32  Unknown Yes    Overview Addendum 7/20/2017  2:48 PM by Sai Richard MD     CHADS score 1  (-CHF, +HTN, -AGE, -DM, -CVA), S/P Ablation                   Plan:   OK for DC from cardiac standpoint      Subjective:     No new complaints.  Feels better    Objective:      Patient Vitals for the past 8 hrs:   Temp Pulse Resp BP SpO2   07/21/17 0859 - 63 - 135/68 97 %   07/21/17 0525 97.9 °F (36.6 °C) 64 16 136/79 94 %         Patient Vitals for the past 96 hrs:   Weight   07/20/17 0643 210 lb 8 oz (95.5 kg)   07/19/17 0250 224 lb 6.4 oz (101.8 kg)   07/18/17 1944 225 lb (102.1 kg)                    Intake/Output Summary (Last 24 hours) at 07/21/17 1155  Last data filed at 07/20/17 1857   Gross per 24 hour   Intake              530 ml   Output              300 ml   Net              230 ml       Medications  Current Facility-Administered Medications   Medication Dose Route Frequency    carvedilol (COREG) tablet 6.25 mg  6.25 mg Oral BID WITH MEALS    bumetanide (BUMEX) tablet 1 mg  1 mg Oral DAILY    sodium chloride (NS) flush 5-10 mL  5-10 mL IntraVENous Q8H    sodium chloride (NS) flush 5-10 mL  5-10 mL IntraVENous PRN    morphine injection 2 mg  2 mg IntraVENous Q4H PRN    enoxaparin (LOVENOX) injection 40 mg  40 mg SubCUTAneous Q24H    aspirin chewable tablet 81 mg  81 mg Oral DAILY    atorvastatin (LIPITOR) tablet 10 mg  10 mg Oral DAILY    buPROPion XL (WELLBUTRIN XL) tablet 150 mg  150 mg Oral 7am    desvenlafaxine succinate (PRISTIQ) ER tablet 50 mg  50 mg Oral DAILY    losartan (COZAAR) tablet 25 mg  25 mg Oral DAILY    ondansetron (ZOFRAN) injection 4 mg  4 mg IntraVENous Q6H PRN         Physical Exam:  General:  alert, cooperative, no distress  Neck:  no JVD  Lungs:  clear to auscultation bilaterally  Heart:  regular rate and rhythm  Abdomen:  abdomen is soft  Extremities:  no edema    Data Review:     Labs: Results:       Chemistry Recent Labs      07/20/17   0440  07/19/17   1120  07/18/17   2115   GLU  104*  106*  116*   NA  140  138  140   K  4.9  4.0  4.0   CL  102  102  106   CO2  31  28  24   BUN  15  16  11   CREA  0.96  0.84  0.78   CA  9.2  9.1  9.6   AGAP  7  8  10   BUCR  16  19  14   AP  85   --   87   TP  7.3   --   7.3   ALB  3.2*   --   3.2*   GLOB  4.1*   --   4.1*   AGRAT  0.8   --   0.8      CBC w/Diff Recent Labs      07/20/17   0440  07/19/17   1120  07/18/17   2115   WBC  8.7  12.2  13.0   RBC  4.24  3.98*  4.30   HGB  13.0  12.2  13.4   HCT  39.8  37.0  39.2   PLT  214  192  162   GRANS  65  80*  80*   LYMPH  18*  10*  10*   EOS  2  0  1      Cardiac Enzymes No results found for: CPK, CKMMB, CKMB, RCK3, CKMBT, CKNDX, CKND1, DEREK, TROPT, TROIQ, ALIZA, TROPT, TNIPOC, BNP, BNPP   Coagulation Recent Labs      07/20/17   0440   PTP  13.7   INR  1.1       Lipid Panel Lab Results   Component Value Date/Time    Cholesterol, total 222 07/12/2010 08:49 AM    HDL Cholesterol 51 07/12/2010 08:49 AM    LDL, calculated 150 07/12/2010 08:49 AM    VLDL, calculated 21 07/12/2010 08:49 AM    Triglyceride 105 07/12/2010 08:49 AM    CHOL/HDL Ratio 4.4 07/12/2010 08:49 AM BNP No results found for: BNP, BNPP, XBNPT   Liver Enzymes Recent Labs      07/20/17   0440   TP  7.3   ALB  3.2*   AP  85   SGOT  19      Digoxin    Thyroid Studies Lab Results   Component Value Date/Time    TSH 3.54 01/15/2013 12:12 AM          Signed By: Srinath Elaine MD     July 21, 2017

## 2017-07-21 NOTE — DISCHARGE INSTRUCTIONS
DISCHARGE SUMMARY from Nurse    The following personal items are in your possession at time of discharge:    Dental Appliances: None  Visual Aid: Glasses, With patient     Home Medications: None  Jewelry: Earrings, Ring  Clothing: Pants, Socks, Shirt  Other Valuables: None             PATIENT INSTRUCTIONS:      What to do at Home:  Recommended activity: Activity as tolerated,     If you experience any of the following symptoms chest pain, nausea, vomiting, please follow up with PCP. *  Please give a list of your current medications to your Primary Care Provider. *  Please update this list whenever your medications are discontinued, doses are      changed, or new medications (including over-the-counter products) are added. *  Please carry medication information at all times in case of emergency situations. These are general instructions for a healthy lifestyle:    No smoking/ No tobacco products/ Avoid exposure to second hand smoke    Surgeon General's Warning:  Quitting smoking now greatly reduces serious risk to your health. Obesity, smoking, and sedentary lifestyle greatly increases your risk for illness    A healthy diet, regular physical exercise & weight monitoring are important for maintaining a healthy lifestyle    You may be retaining fluid if you have a history of heart failure or if you experience any of the following symptoms:  Weight gain of 3 pounds or more overnight or 5 pounds in a week, increased swelling in our hands or feet or shortness of breath while lying flat in bed. Please call your doctor as soon as you notice any of these symptoms; do not wait until your next office visit. Recognize signs and symptoms of STROKE:    F-face looks uneven    A-arms unable to move or move unevenly    S-speech slurred or non-existent    T-time-call 911 as soon as signs and symptoms begin-DO NOT go       Back to bed or wait to see if you get better-TIME IS BRAIN.     Warning Signs of HEART ATTACK     Call 911 if you have these symptoms:   Chest discomfort. Most heart attacks involve discomfort in the center of the chest that lasts more than a few minutes, or that goes away and comes back. It can feel like uncomfortable pressure, squeezing, fullness, or pain.  Discomfort in other areas of the upper body. Symptoms can include pain or discomfort in one or both arms, the back, neck, jaw, or stomach.  Shortness of breath with or without chest discomfort.  Other signs may include breaking out in a cold sweat, nausea, or lightheadedness. Don't wait more than five minutes to call 911 - MINUTES MATTER! Fast action can save your life. Calling 911 is almost always the fastest way to get lifesaving treatment. Emergency Medical Services staff can begin treatment when they arrive -- up to an hour sooner than if someone gets to the hospital by car. The discharge information has been reviewed with the patient. The patient verbalized understanding. Discharge medications reviewed with the patient and appropriate educational materials and side effects teaching were provided. Cardiac Rehabilitation: Care Instructions  Your Care Instructions    Cardiac rehabilitation is a program for people who have a heart problem, such as a heart attack, heart failure, or a heart valve disease. The program includes exercise, lifestyle changes, education, and emotional support. Cardiac rehab can help you improve the quality of your life through better overall health. It can help you lose weight and feel better about yourself. On your cardiac rehab team, you may have your doctor, a nurse specialist, a dietitian, and a physical therapist. They will design your cardiac rehab program specifically for you. You will learn how to reduce your risk for heart problems, how to manage stress, and how to eat a heart-healthy diet.  By the end of the program, you will be ready to maintain a healthier lifestyle on your own. Follow-up care is a key part of your treatment and safety. Be sure to make and go to all appointments, and call your doctor if you are having problems. It's also a good idea to know your test results and keep a list of the medicines you take. How can you care for yourself at home? · Take your medicines exactly as prescribed. Call your doctor if you think you are having a problem with your medicine. You will get more details on the specific medicines your doctor prescribes. · Weigh yourself every day if your doctor tells you to. Watch for sudden weight gain. Weigh yourself on the same scale with the same amount of clothing at the same time of day. · Plan your meals so that you are eating heart-healthy foods. ¨ Eat a variety of foods daily. Fresh fruits and vegetables and whole-grains are good choices. ¨ Limit your fat intake, especially saturated and trans fat. ¨ Limit salt (sodium). ¨ Increase fiber in your diet. ¨ Limit alcohol. · Learn how to take your pulse so that you can track your heart rate during exercise. · Always check with your doctor before you begin a new exercise program.  · Warm up before you exercise and cool down afterward for at least 15 minutes each. This will help your heart gradually prepare for and recover from exercise and avoid pushing your heart too hard. · Stop exercising if you have any unusual discomfort, such as chest pain. · Do not smoke. Smoking can make heart problems worse. If you need help quitting, talk to your doctor about stop-smoking programs and medicines. These can increase your chances of quitting for good. When should you call for help? Call 911 anytime you think you may need emergency care. For example, call if:  · You have severe trouble breathing. · You cough up pink, foamy mucus and you have trouble breathing. · You have symptoms of a heart attack.  These may include:  ¨ Chest pain or pressure, or a strange feeling in the chest.  ¨ Sweating. ¨ Shortness of breath. ¨ Nausea or vomiting. ¨ Pain, pressure, or a strange feeling in the back, neck, jaw, or upper belly or in one or both shoulders or arms. ¨ Lightheadedness or sudden weakness. ¨ A fast or irregular heartbeat. After you call 911, the  may tell you to chew 1 adult-strength or 2 to 4 low-dose aspirin. Wait for an ambulance. Do not try to drive yourself. · You have angina symptoms (such as chest pain or pressure) that do not go away with rest or are not getting better within 5 minutes after you take a dose of nitroglycerin. · You have symptoms of a stroke. These may include:  ¨ Sudden numbness, tingling, weakness, or loss of movement in your face, arm, or leg, especially on only one side of your body. ¨ Sudden vision changes. ¨ Sudden trouble speaking. ¨ Sudden confusion or trouble understanding simple statements. ¨ Sudden problems with walking or balance. ¨ A sudden, severe headache that is different from past headaches. · You passed out (lost consciousness). Call your doctor now or seek immediate medical care if:  · You have new or increased shortness of breath. · You are dizzy or lightheaded, or you feel like you may faint. · You gain weight suddenly, such as more than 2 to 3 pounds in a day or 5 pounds in a week. (Your doctor may suggest a different range of weight gain.)  · You have increased swelling in your legs, ankles, or feet. Watch closely for changes in your health, and be sure to contact your doctor if you have any problems. Where can you learn more? Go to http://itzel-palak.info/. Enter C396 in the search box to learn more about \"Cardiac Rehabilitation: Care Instructions. \"  Current as of: February 23, 2017  Content Version: 11.3  © 7011-8169 [employer]. Care instructions adapted under license by [employer] (which disclaims liability or warranty for this information).  If you have questions about a medical condition or this instruction, always ask your healthcare professional. [address] any warranty or liability for your use of this information. Avoiding Triggers With Heart Failure: Care Instructions  Your Care Instructions  Triggers are anything that make your heart failure flare up. A flare-up is also called \"sudden heart failure\" or \"acute heart failure. \" When you have a flare-up, fluid builds up in your lungs, and you have problems breathing. You might need to go to the hospital. By watching for changes in your condition and avoiding triggers, you can prevent heart failure flare-ups. Follow-up care is a key part of your treatment and safety. Be sure to make and go to all appointments, and call your doctor if you are having problems. It's also a good idea to know your test results and keep a list of the medicines you take. How can you care for yourself at home? Watch for changes in your weight and condition  · Weigh yourself without clothing at the same time each day. Record your weight. Call your doctor if you have sudden weight gain, such as more than 2 to 3 pounds in a day or 5 pounds in a week. (Your doctor may suggest a different range of weight gain.) A sudden weight gain may mean that your heart failure is getting worse. · Keep a daily record of your symptoms. Write down any changes in how you feel, such as new shortness of breath, cough, or problems eating. Also record if your ankles are more swollen than usual and if you feel more tired than usual. Note anything that you ate or did that could have triggered these changes. Limit sodium  Sodium causes your body to hold on to extra water. This may cause your heart failure symptoms to get worse. People get most of their sodium from processed foods. Fast food and restaurant meals also tend to be very high in sodium. · Your doctor may suggest that you limit sodium to 2,000 milligrams (mg) a day or less.  That is less than 1 teaspoon of salt a day, including all the salt you eat in cooking or in packaged foods. · Read food labels on cans and food packages. They tell you how much sodium you get in one serving. Check the serving size. If you eat more than one serving, you are getting more sodium. · Be aware that sodium can come in forms other than salt, including monosodium glutamate (MSG), sodium citrate, and sodium bicarbonate (baking soda). MSG is often added to Asian food. You can sometimes ask for food without MSG or salt. · Slowly reducing salt will help you adjust to the taste. Take the salt shaker off the table. · Flavor your food with garlic, lemon juice, onion, vinegar, herbs, and spices instead of salt. Do not use soy sauce, steak sauce, onion salt, garlic salt, mustard, or ketchup on your food, unless it is labeled \"low-sodium\" or \"low-salt. \"  · Make your own salad dressings, sauces, and ketchup without adding salt. · Use fresh or frozen ingredients, instead of canned ones, whenever you can. Choose low-sodium canned goods. · Eat less processed food and food from restaurants, including fast food. Exercise as directed  Moderate, regular exercise is very good for your heart. It improves your blood flow and helps control your weight. But too much exercise can stress your heart and cause a heart failure flare-up. · Check with your doctor before you start an exercise program.  · Walking is an easy way to get exercise. Start out slowly. Gradually increase the length and pace of your walk. Swimming, riding a bike, and using a treadmill are also good forms of exercise. · When you exercise, watch for signs that your heart is working too hard. You are pushing yourself too hard if you cannot talk while you are exercising. If you become short of breath or dizzy or have chest pain, stop, sit down, and rest.  · Do not exercise when you do not feel well.   Take medicines correctly  · Take your medicines exactly as prescribed. Call your doctor if you think you are having a problem with your medicine. · Make a list of all the medicines you take. Include those prescribed to you by other doctors and any over-the-counter medicines, vitamins, or supplements you take. Take this list with you when you go to any doctor. · Take your medicines at the same time every day. It may help you to post a list of all the medicines you take every day and what time of day you take them. · Make taking your medicine as simple as you can. Plan times to take your medicines when you are doing other things, such as eating a meal or getting ready for bed. This will make it easier to remember to take your medicines. · Get organized. Use helpful tools, such as daily or weekly pill containers. When should you call for help? Call 911 if you have symptoms of sudden heart failure such as:  · You have severe trouble breathing. · You cough up pink, foamy mucus. · You have a new irregular or rapid heartbeat. Call your doctor now or seek immediate medical care if:  · You have new or increased shortness of breath. · You are dizzy or lightheaded, or you feel like you may faint. · You have sudden weight gain, such as more than 2 to 3 pounds in a day or 5 pounds in a week. (Your doctor may suggest a different range of weight gain.)  · You have increased swelling in your legs, ankles, or feet. · You are suddenly so tired or weak that you cannot do your usual activities. Watch closely for changes in your health, and be sure to contact your doctor if you develop new symptoms. Where can you learn more? Go to http://itzel-palak.info/. Enter Q182 in the search box to learn more about \"Avoiding Triggers With Heart Failure: Care Instructions. \"  Current as of: February 23, 2017  Content Version: 11.3  © 9362-7659 [employer].  Care instructions adapted under license by [employer] (which disclaims liability or warranty for this information). If you have questions about a medical condition or this instruction, always ask your healthcare professional. [address] any warranty or liability for your use of this information. Learning About Heart Failure  What is heart failure? Heart failure means that your heart muscle does not pump as much blood as your body needs. Failure does not mean that your heart has stopped. It means that your heart is not pumping as well as it should. Your body has an amazing ability to make up for heart failure. It may do such a good job that you don't know you have a disease. But at some point, your heart and body will no longer be able to keep up. Then fluid starts to build up in your lungs and other parts of your body. What can you expect when you have heart failure? Heart failure is a lifelong (chronic) disease. Treatment may be able to slow the disease and help you feel better. But heart failure tends to get worse over time. Despite this, there are many steps you can take to feel better and stay healthy longer. Early on, your symptoms may not be too bad. As heart failure gets worse, symptoms typically get worse, and you may need to limit your activities. Heart failure can also get worse suddenly. If this happens, you need emergency care. Then, after treatment, your symptoms may go back to being stable (which means they stay the same) for a long time. Heart failure can lead to other health problems, such as heart rhythm problems. Over time, your treatment options may change, especially as your symptoms get worse. As heart failure gets worse, palliative care can help improve the quality of your life. You can do advance care planning to decide what kind of care you want at the end of your life. What are the symptoms? Symptoms of heart failure start to happen when your heart can't pump enough blood to the rest of your body.   In the early stages of heart failure, you may:  · Feel tired easily. · Be short of breath when you exert yourself. · Feel like your heart is pounding or racing (palpitations). · Feel weak or dizzy. As heart failure gets worse, fluid starts to build up in your lungs and other parts of your body. This may cause you to:  · Feel short of breath even at rest.  · Have swelling (edema), especially in your legs, ankles, and feet. · Gain weight. This may happen over just a day or two, or more slowly. · Cough or wheeze, especially when you lie down. How is heart failure treated? · You'll probably take several medicines. · You might attend cardiac rehabilitation (rehab) to get education and support that help you make lifestyle changes and stay as healthy as possible. · You may get a heart device. A pacemaker helps your heart pump blood. An ICD can stop abnormal heart rhythms. How can you care for yourself? There are many steps you can take to feel better and stay healthy longer. These steps are an important part of treatment. They can help you stay active and enjoy life. · Take your medicine the right way. Avoid medicines that can make your symptoms worse. · Check your weight and symptoms every day. Know what to do if your symptoms get worse. · Limit sodium. This helps keep fluid from building up. It may help you feel better. · Be active. Exercise regularly, but don't exercise too hard. · Be heart-healthy. Eat healthy foods, stay at a healthy weight, limit alcohol, and don't smoke. · Stay as healthy as possible. Avoid colds and flu, get help for depression and anxiety, and manage stress. Follow-up care is a key part of your treatment and safety. Be sure to make and go to all appointments, and call your doctor if you are having problems. It's also a good idea to know your test results and keep a list of the medicines you take. Where can you learn more? Go to http://itzel-palak.info/.   Enter E719 in the search box to learn more about \"Learning About Heart Failure. \"  Current as of: November 15, 2016  Content Version: 11.3  © 9136-4872 [employer]. Care instructions adapted under license by [employer] (which disclaims liability or warranty for this information). If you have questions about a medical condition or this instruction, always ask your healthcare professional. [address] any warranty or liability for your use of this information. Learning About Self-Care for Heart Failure  What is self-care for heart failure? Heart failure usually gets worse over time. But there are many things you can do to feel better, stay healthy longer, and avoid the hospital.  Self-care means managing your health by doing certain things every day, like weighing yourself. It's about knowing which symptoms to watch for so you can avoid getting worse. When you practice good self-care, you know when it's time to call your doctor and when your heart failure has turned into an emergency. The lists below can help. Top 5 self-care tips for every day  1. Take your medicines as prescribed. This gives them the best chance of helping you. 2. Watch for signs that you're getting worse. Weighing yourself every day is one of the best ways to do this. Weight gain may be a sign that your body is holding on to too much fluid. Weigh yourself at the same time each day, using the same scale, on a hard, flat surface. The best time is in the morning after you go to the bathroom and before you eat or drink anything. 3. Find out what your triggers are, and learn to avoid them. Triggers are things that make your heart failure worse, often suddenly. A trigger may be eating too much salt, missing a dose of your medicine, or exercising too hard. 4. Limit sodium. This helps keep fluid from building up and may help you feel better. Your doctor may suggest that you limit sodium to 2,000 milligrams (mg) a day or less.  That's less than 1 teaspoon. You can stay under this amount by limiting the salt you eat at home and by watching for \"hidden\" sodium when you eat out or shop for food. 5. Try to exercise throughout the week. Exercise makes your heart stronger and can help you avoid symptoms. Walking is a great way to get exercise. If your doctor says it's safe, start out with some short walks, and then slowly build up to longer ones. When should you act? Try to become familiar with signs that mean your heart failure is getting worse. If you need help, talk with your doctor about making a personal plan. Here are some things to watch for as you practice your daily self-care. Call your doctor if:  · You have sudden weight gain, such as more than 2 to 3 pounds in a day or 5 pounds in a week. (Your doctor may suggest a different range of weight gain.)  · You have new or worse swelling in your feet, ankles, or legs. · Your breathing gets worse. Activities that did not make you short of breath before are hard for you now. · Your breathing when you lie down is worse than usual, or you wake up at night needing to catch your breath. Be sure to make and go to all of your follow-up appointments. And it's always a good idea to call your doctor anytime you have a sudden change in symptoms. When is it an emergency? Sometimes the symptoms get worse very quickly. This is called sudden heart failure. It causes fluid to build up in your lungs. Sudden heart failure is an emergency. If you have any of these symptoms, you need care right away. Call 911 if:  · You have severe shortness of breath. · You have an irregular or fast heartbeat. · You cough up foamy, pink mucus. What else can you do to stay healthy? There are other things you can do to take care of your body and your heart. These things will help you feel better. And they will also reduce your risk of heart attack and stroke. · Try to stay at a healthy weight.  Eat a healthy diet with lots of fresh fruit, vegetables, and whole grains. · If you smoke, quit. · Limit the amount of alcohol you drink. · Keep high blood pressure and diabetes under control. If you need help, talk with your doctor. If your doctor has not set you up with a cardiac rehabilitation (rehab) program, talk to him or her about whether that is right for you. Cardiac rehab includes exercise, help with diet and lifestyle changes, and emotional support. Also let your doctor know if:  · You're having trouble sleeping. Sleep is important to your well-being. It also helps your heart work the way it's supposed to. Your doctor can help you decide if you need treatment for sleep problems. · You're feeling sad or hopeless much of the time, or you are worried and anxious. Heart failure can be hard on your emotions. Treatment with counseling and medicine can help. And when you feel better, you're more likely to take care of yourself. Follow-up care is a key part of your treatment and safety. Be sure to make and go to all appointments, and call your doctor if you are having problems. It's also a good idea to know your test results and keep a list of the medicines you take. Where can you learn more? Go to http://itzel-palak.info/. Enter I566 in the search box to learn more about \"Learning About Self-Care for Heart Failure. \"  Current as of: March 16, 2017  Content Version: 11.3  © 9272-3156 [employer]. Care instructions adapted under license by [employer] (which disclaims liability or warranty for this information). If you have questions about a medical condition or this instruction, always ask your healthcare professional. [address] any warranty or liability for your use of this information.

## 2017-07-21 NOTE — PROGRESS NOTES
NUTRITION  Patient/Family Education Record    FACTORS THAT MAY INFLUENCE PATIENTS ABILITY TO LEARN:   []   Language barrier    []   Cultural needs   []   Motivation    []   Cognitive limitation    []   Physical   []   Education   []   Physiological factors   []   Hearing/vision/speaking impairment   []   Jewish beliefs    []   Financial limitations    []  Other:   [x]   No barriers limiting ability to learn     Person Instructed:   [x]   Patient   [x]   Family   []  Other     Preference for Learning:   [x]   Verbal   [x]   Written   []  Demonstration     Patient educated on:   [x] Cardiac/heart healthy diet  [] 2gm Sodium diet  [] Vitamin K regulated diet (coumadin)  [] Weight loss/portion control  [] High protein  [] Other:    Goal:  Patient will demonstrate understanding of modified diet by 7/28    Outcome:   [x]  Patient verbalized understanding of education and willing to comply with recommendations. Demonstrated teach back.   []  Patient declined education  []  Patient needs follow up education; scheduled date for follow up:  [x]  Written information provided  [x]  RD contact information provided    Thomas Jean

## 2017-07-21 NOTE — PROGRESS NOTES
07:30- Report received from previous nurse. Patient has no c/o at this time. 08:30- Assessment completed- see flow sheet. Lasix ordered this A.M.-teaching done on lasix medication and dietary teaching in regards to low salt, fluid restriction. Continue to monitor. Call light in reach. 12:00- No change in condition. Continue to monitor. Family visiting with patient. 19:30- report to oncoming nurse given.

## 2017-07-21 NOTE — DISCHARGE SUMMARY
Discharge Summary    Patient: Jesus Alberto Silva               Sex: female          DOA: 7/18/2017         YOB: 1955      Age:  59 y.o.        LOS:  LOS: 2 days                Admit Date: 7/18/2017    Discharge Date: 7/21/2017    Discharge Medications:     Discharge Medication List as of 7/21/2017 11:30 AM      START taking these medications    Details   bumetanide (BUMEX) 1 mg tablet Take 1 Tab by mouth daily. , Normal, Disp-30 Tab, R-0      carvedilol (COREG) 6.25 mg tablet Take 1 Tab by mouth two (2) times daily (with meals). , Normal, Disp-60 Tab, R-0         CONTINUE these medications which have NOT CHANGED    Details   losartan (COZAAR) 25 mg tablet take 1 tablet by mouth once daily, Historical Med, R-0      atorvastatin (LIPITOR) 10 mg tablet Historical Med, R-0      aspirin 81 mg chewable tablet Take 1 Tab by mouth daily. Normal, 81 mg, Disp-90 Tab, R-3      buPROPion XL (WELLBUTRIN XL) 150 mg tablet Take 150 mg by mouth every morning. Historical Med, 150 mg      CALCIUM PO Take 1,200 mg by mouth daily. Historical Med, 1,200 mg      multivitamin, stress formula (STRESS TAB) tablet Take 1 Tab by mouth daily. Historical Med, 1 Tab      cholecalciferol (VITAMIN D3) 1,000 unit tablet Take  by mouth daily. Historical Med      omega-3 fatty acids-vitamin e (FISH OIL) 1,000 mg cap Take 1 Cap by mouth. Historical Med, 1 Cap      Desvenlafaxine SR (PRISTIQ) 50 mg tablet Take 50 mg by mouth daily. Indications: GENERALIZED ANXIETY DISORDERHistorical Med, 50 mg         STOP taking these medications       hydrochlorothiazide (HYDRODIURIL) 25 mg tablet Comments:   Reason for Stopping:                Follow-up: pcp, cardiology    Discharge Condition: Stable    Activity: Activity as tolerated    Diet: Cardiac Diet    Labs:  Labs: Results:       Chemistry Recent Labs      07/20/17   0440  07/19/17   1120  07/18/17   2115   GLU  104*  106*  116*   NA  140  138  140   K  4.9  4.0  4.0   CL  102  102  106   CO2 31  28  24   BUN  15  16  11   CREA  0.96  0.84  0.78   CA  9.2  9.1  9.6   AGAP  7  8  10   BUCR  16  19  14   AP  85   --   87   TP  7.3   --   7.3   ALB  3.2*   --   3.2*   GLOB  4.1*   --   4.1*   AGRAT  0.8   --   0.8      CBC w/Diff Recent Labs      17   0440  17   1120  17   2115   WBC  8.7  12.2  13.0   RBC  4.24  3.98*  4.30   HGB  13.0  12.2  13.4   HCT  39.8  37.0  39.2   PLT  214  192  162   GRANS  65  80*  80*   LYMPH  18*  10*  10*   EOS  2  0  1      Cardiac Enzymes No results for input(s): CPK, CKND1, DEREK in the last 72 hours. No lab exists for component: CKRMB, TROIP   Coagulation Recent Labs      17   0440   PTP  13.7   INR  1.1       Lipid Panel Lab Results   Component Value Date/Time    Cholesterol, total 222 2010 08:49 AM    HDL Cholesterol 51 2010 08:49 AM    LDL, calculated 150 2010 08:49 AM    VLDL, calculated 21 2010 08:49 AM    Triglyceride 105 2010 08:49 AM    CHOL/HDL Ratio 4.4 2010 08:49 AM      BNP No results for input(s): BNPP in the last 72 hours. Liver Enzymes Recent Labs      17   0440   TP  7.3   ALB  3.2*   AP  85   SGOT  19      Thyroid Studies Lab Results   Component Value Date/Time    TSH 3.54 01/15/2013 12:12 AM          Imagin/21  Xray- Interval resolution of previous mild pulmonary vascular congestion.     Mild bibasilar scarring and/or atelectasis. Echo  LEFT VENTRICLE: Size was normal. Systolic function was moderately to  markedly reduced by visual assessment. Ejection fraction was estimated to  be 30 % in the range of 30 %. Moderate diffuse hypokinesis with  paradoxical septal motion and possible apical regional variatino. Wall  thickness was increased. RIGHT VENTRICLE: A pacing wire was present. DOPPLER: Estimated peak  pressure was in the range of 35 mmHg to 40 mmHg.     Consults: Cardiology    Treatment Team: Treatment Team: Care Manager: Jenny Serrano RN; Care Manager: Aníbal Diaz Andrea    Significant Diagnostic Studies: labs:   Recent Results (from the past 24 hour(s))   NT-PRO BNP    Collection Time: 07/21/17  5:05 AM   Result Value Ref Range    NT pro- 0 - 900 PG/ML         Discharge diagnoses:    Problem List as of 7/21/2017  Date Reviewed: 12/19/2016          Codes Class Noted - Resolved    Acute on chronic systolic congestive heart failure ([address] ICD-10-CM: I50.23  ICD-9-CM: 428.23, 428.0  7/20/2017 - Present        Bronchiolitis ICD-10-CM: J21.9  ICD-9-CM: 466.19  7/20/2017 - Present        Chest pain ICD-10-CM: R07.9  ICD-9-CM: 786.50  7/19/2017 - Present        Cardiac pacemaker in situ ICD-10-CM: Z95.0  ICD-9-CM: V45.01  2/25/2016 - Present        Hypertension ICD-10-CM: I11.9  ICD-9-CM: 402.10  Unknown - Present        Dyslipidemia ICD-10-CM: E78.5  ICD-9-CM: 272.4  Unknown - Present        Atrial flutter  ICD-10-CM: I48.92  ICD-9-CM: 427.32  Unknown - Present    Overview Addendum 7/20/2017  2:48 PM by Morris Gurrola MD     CHADS score 1  (-CHF, +HTN, -AGE, -DM, -CVA), S/P Ablation             Symptomatic bradycardia ICD-10-CM: R00.1  ICD-9-CM: 427.89  Unknown - Present            Hospital Course:   Major issues addressed during hospitalization outlined  below. Ayleen French is a 59 y.o. female with pmhx of htn, hld, atrial flutter, pacemaker, JOSÉ MIGUEL, depression who presents with c/o sob onset 2 days ago. Patient reports that she also had heavy pressure like chest pain and was unable to breath. She denies fever, nausea, vomiting. She was positive for cough productive of white phelgm. In the ED she received morphine , lasix 40 mg, nitrobid. CXR shows cardiomegaly with BL pulmonary congestion. Patient wuill be admitted for further treatment. Patient improved symptomatically with treatment of CHF with IV bumex. Cardiology was consulted and Echo was completed with findings of EF 30%.   She was started on coreg and tapered on bumex to po bumex - she was discharged with script. Extensive education done about diet, fluid intake, follow up. She was in agreement to f/u with cardiology and PCP on discharge. Repeat xray as above and repeat bnp confirms improvement of HF.     Nancy Blackburn MD  July 21, 2017        Total time spent 40 minutes

## 2017-08-09 ENCOUNTER — OFFICE VISIT (OUTPATIENT)
Dept: CARDIOLOGY CLINIC | Age: 62
End: 2017-08-09

## 2017-08-09 VITALS — HEART RATE: 84 BPM | BODY MASS INDEX: 36.8 KG/M2 | OXYGEN SATURATION: 95 % | WEIGHT: 200 LBS | HEIGHT: 62 IN

## 2017-08-09 DIAGNOSIS — G47.33 OSA (OBSTRUCTIVE SLEEP APNEA): ICD-10-CM

## 2017-08-09 DIAGNOSIS — R07.9 CHEST PAIN, UNSPECIFIED TYPE: ICD-10-CM

## 2017-08-09 DIAGNOSIS — I48.3 TYPICAL ATRIAL FLUTTER (HCC): ICD-10-CM

## 2017-08-09 DIAGNOSIS — E78.5 DYSLIPIDEMIA: ICD-10-CM

## 2017-08-09 DIAGNOSIS — I11.9 BENIGN HYPERTENSIVE HEART DISEASE WITHOUT HEART FAILURE: ICD-10-CM

## 2017-08-09 DIAGNOSIS — I50.23 ACUTE ON CHRONIC SYSTOLIC CONGESTIVE HEART FAILURE (HCC): Primary | ICD-10-CM

## 2017-08-09 DIAGNOSIS — Z95.0 CARDIAC PACEMAKER IN SITU: ICD-10-CM

## 2017-08-09 NOTE — LETTER
NOTIFICATION RETURN TO WORK / SCHOOL 
 
8/9/2017 9:39 AM 
 
Ms. Jesus Alberto Silva [address] [place] [address] [phone] To Whom It May Concern: 
 
Jesus Alberto Silva is currently under the care of CARDIO SPECIALIST AT [facility]. She will return to work/school on: Monday 8/14/2017. If there are questions or concerns please have the patient contact our office. Sincerely, Sai Richard MD

## 2017-08-09 NOTE — MR AVS SNAPSHOT
Visit Information Date & Time Provider Department Dept. Phone Encounter #  
 8/9/2017  9:00 AM Sai Richard MD [address] [place] [address] Specialist at [facility] [phone] [id number] Follow-up Instructions Return in about 6 weeks (around 9/20/2017). Your Appointments 10/11/2017  4:00 PM  
CARELINK with [facility] [address] ([facility]) Appt Note: 3 month f/u after July -[facility] [place] [phone]  
[phone] [address] Upcoming Health Maintenance Date Due Hepatitis C Screening 1955 Pneumococcal 19-64 Medium Risk (1 of 1 - PPSV23) 12/25/1972 DTaP/Tdap/Td series (1 - Tdap) 12/25/1974 FOBT Q 1 YEAR AGE 50-75 12/25/2003 ZOSTER VACCINE AGE 60> 10/25/2013 PAP AKA CERVICAL CYTOLOGY 3/21/2015 INFLUENZA AGE 9 TO ADULT 8/1/2017 BREAST CANCER SCRN MAMMOGRAM 12/28/2018 Allergies as of 8/9/2017  Review Complete On: 8/9/2017 By: Rajan Ashford RN No Known Allergies Current Immunizations  Never Reviewed No immunizations on file. Not reviewed this visit Vitals Pulse Height(growth percentile) Weight(growth percentile) SpO2 BMI OB Status 84 5' 2\" (1.575 m) 200 lb (90.7 kg) 95% 36.58 kg/m2 Hysterectomy Smoking Status Never Smoker BMI and BSA Data Body Mass Index Body Surface Area  
 36.58 kg/m 2 1.99 m 2 Preferred Pharmacy Pharmacy Name Phone [facility] - [address], [address] [phone] Your Updated Medication List  
  
   
This list is accurate as of: 8/9/17  9:55 AM.  Always use your most recent med list.  
  
  
  
  
 aspirin 81 mg chewable tablet Take 1 Tab by mouth daily. atorvastatin 10 mg tablet Commonly known as:  LIPITOR  
  
 bumetanide 1 mg tablet Commonly known as:  Hyacinth Caal Take 1 Tab by mouth daily. CALCIUM PO Take 1,200 mg by mouth daily. carvedilol 6.25 mg tablet Commonly known as:  Mirzajunior Rizorow Take 1 Tab by mouth two (2) times daily (with meals). FISH OIL 1,000 mg Cap Generic drug:  omega-3 fatty acids-vitamin e Take 1 Cap by mouth.  
  
 losartan 25 mg tablet Commonly known as:  COZAAR  
take 1 tablet by mouth once daily  
  
 multivitamin, stress formula tablet Commonly known as:  STRESS TAB Take 1 Tab by mouth daily. PRISTIQ 50 mg ER tablet Generic drug:  desvenlafaxine succinate Take 50 mg by mouth daily. Indications: GENERALIZED ANXIETY DISORDER  
  
 VITAMIN D3 1,000 unit tablet Generic drug:  cholecalciferol Take  by mouth daily. WELLBUTRIN  mg tablet Generic drug:  buPROPion XL Take 150 mg by mouth every morning. Follow-up Instructions Return in about 6 weeks (around 9/20/2017). Introducing [facility]! Dear Eloy Auguste: Thank you for requesting a [employer] account. Our records indicate that you already have an active [employer]Line account. You can access your account anytime at [email or web address] Did you know that you can access your hospital and ER discharge instructions at any time in [employer]Line? You can also review all of your test results from your hospital stay or ER visit. Additional Information If you have questions, please visit the Frequently Asked Questions section of the [employer]Line website at [email or web address] Remember, [employer]Line is NOT to be used for urgent needs. For medical emergencies, dial 911. Now available from your iPhone and Android! Please provide this summary of care documentation to your next provider. Your primary care clinician is listed as CATHY Aguillon. If you have any questions after today's visit, please call [phone].

## 2017-08-14 PROBLEM — G47.33 OSA (OBSTRUCTIVE SLEEP APNEA): Status: ACTIVE | Noted: 2017-08-14

## 2017-08-14 RX ORDER — CARVEDILOL 6.25 MG/1
6.25 TABLET ORAL 2 TIMES DAILY WITH MEALS
Qty: 60 TAB | Refills: 9 | Status: SHIPPED | OUTPATIENT
Start: 2017-08-14 | End: 2018-06-14 | Stop reason: SDUPTHER

## 2017-08-14 RX ORDER — BUMETANIDE 1 MG/1
1 TABLET ORAL DAILY
Qty: 30 TAB | Refills: 9 | Status: SHIPPED | OUTPATIENT
Start: 2017-08-14 | End: 2018-06-14 | Stop reason: SDUPTHER

## 2017-08-15 NOTE — COMMUNICATION BODY
[facility]    HPI:    Ms. Sejal Lemons is a 59 y.o. woman with hypertension. She has a history of atrial flutter with symptomatic bradycardia. She is status post atrial flutter ablation and permanent pacemaker implantation in March 2013. Ms. Sejal Lemons is here today for follow up appointment. She used to be an established patient of Dr. Eula Biggs. She is here to establish care with me today. She denies any symptoms to suggest angina or heart failure. She says she has been diagnosed with chronic bronchiolitis by Dr. Damian Eisenmenger and she has been started on inhaler treatment. Since then her breathing has improved significantly. She can walk at least three blocks without any shortness of breath. She says that she does not have any symptoms to be concerned of angina. She denies any chest pain or chest tightness with resting or exertion. She denies any presyncope or syncope. She uses a CPAP machine for her sleep apnea. Past Medical History:   Diagnosis Date    Atrial flutter     CHADS score 1  (-CHF, +HTN, -AGE, -DM, -CVA)    Atrial flutter ablation     3/13    Dyslipidemia     Hypertension     Obstructive sleep apnea     CPAP    Pacemaker     3/13 ([employer])    Symptomatic bradycardia        Past Surgical History:   Procedure Laterality Date    HX HYSTERECTOMY      ? years ago    HX OOPHORECTOMY         Current Outpatient Prescriptions   Medication Sig    losartan (COZAAR) 25 mg tablet take 1 tablet by mouth once daily    atorvastatin (LIPITOR) 10 mg tablet     hydrochlorothiazide (HYDRODIURIL) 25 mg tablet Take 1 Tab by mouth daily.  aspirin 81 mg chewable tablet Take 1 Tab by mouth daily.  buPROPion XL (WELLBUTRIN XL) 150 mg tablet Take 150 mg by mouth every morning.  CALCIUM PO Take 1,200 mg by mouth daily.  multivitamin, stress formula (STRESS TAB) tablet Take 1 Tab by mouth daily.  cholecalciferol (VITAMIN D3) 1,000 unit tablet Take  by mouth daily.       omega-3 fatty acids-vitamin e (FISH OIL) 1,000 mg cap Take 1 Cap by mouth.  Desvenlafaxine SR (PRISTIQ) 50 mg tablet Take 50 mg by mouth daily. Indications: GENERALIZED ANXIETY DISORDER     No current facility-administered medications for this visit. Allergies and Intolerances:   No Known Allergies      Family History:   Non contributory for premature coronary disease in first degree relatives. Social History:   She  reports that she has never smoked. She has never used smokeless tobacco.  She  reports that she drinks alcohol. Review of Systems:   As above otherwise 11 point review of systems negative including constitutional, skin, HENT, eyes, respiratory, cardiovascular, gastrointestinal, genitourinary, musculoskeletal, endo/heme/aller, neurological.      Physical:   Vitals:   BP: 136/73  HR: 73  Wt: 213 lb (96.6 kg)    Exam:   GENERAL:  Ms. Maudine Denver is a middle-aged woman without complaints. CHEST:  Clear with good air movement  CARDIAC:  Regular. No murmurs  ABDOMEN:  Bowel sounds present. EXTREMITIES:  No edema. CAROTID:  No bruit. NECK:  No JVD. Review of Data:   LABS:   Lab Results   Component Value Date/Time    WBC 6.5 03/06/2013 12:40 PM    HGB 12.7 03/06/2013 12:40 PM    HCT 39.7 03/06/2013 12:40 PM    PLATELET 550 97/34/2966 12:40 PM     Lab Results   Component Value Date/Time    Sodium 138 01/03/2017 12:00 AM    Potassium 4.3 01/03/2017 12:00 AM    Chloride 98 01/03/2017 12:00 AM    CO2 21 01/03/2017 12:00 AM    Anion gap 5 02/05/2014 03:42 PM    Glucose 105 01/03/2017 12:00 AM    BUN 22 01/03/2017 12:00 AM    Creatinine 0.74 01/03/2017 12:00 AM    BUN/Creatinine ratio 30 01/03/2017 12:00 AM    GFR est  01/03/2017 12:00 AM    GFR est non-AA 86 01/03/2017 12:00 AM    Calcium 9.6 01/03/2017 12:00 AM    Bilirubin, total 0.3 03/06/2013 12:40 PM    AST (SGOT) 24 03/06/2013 12:40 PM    Alk.  phosphatase 87 03/06/2013 12:40 PM    Protein, total 6.9 03/06/2013 12:40 PM    Albumin 3.2 03/06/2013 12:40 PM    Globulin 3.7 03/06/2013 12:40 PM    A-G Ratio 0.9 03/06/2013 12:40 PM    ALT (SGPT) 23 03/06/2013 12:40 PM     Lab Results   Component Value Date/Time    Cholesterol, total 222 07/12/2010 08:49 AM    HDL Cholesterol 51 07/12/2010 08:49 AM    LDL, calculated 150 07/12/2010 08:49 AM    Triglyceride 105 07/12/2010 08:49 AM     No results found for: HBA1C, UNE7REOG    December 2015: Total cholesterol 156, triglycerides 60, HDL 53, LDL 80. EKG: June 2016:  100% paced 81 bpm.    TRANSTHORACIC ECHO: January 2013:  LEFT VENTRICLE: Size was normal. Systolic function was normal. Ejection fraction was estimated in the range of 55 % to 60 %. No obvious wall motion abnormalities identified in the views obtained. Wall thickness was normal. DOPPLER: The study was not technically sufficient to allow evaluation of LV diastolic function. RIGHT VENTRICLE: The size was normal. Wall thickness was normal. DOPPLER: The tricuspid jet envelope definition was inadequate for estimation of RV systolic pressure. There are no indirect findings (abnormal RV volume or geometry, altered pulmonary flow velocity profile, or leftward septal displacement) which would suggest moderate or severe pulmonary hypertension. LEFT ATRIUM: Size was normal.    RIGHT ATRIUM: Size was normal.    MITRAL VALVE: There was normal leaflet separation. DOPPLER: There was no evidence for stenosis. There was trivial regurgitation. AORTIC VALVE: The valve was trileaflet. Leaflets exhibited sclerosis. DOPPLER: There was no stenosis. There was no regurgitation. TRICUSPID VALVE: There was normal leaflet separation. DOPPLER: There was no evidence for tricuspid stenosis. There was trivial regurgitation. PULMONIC VALVE: Not well visualized, but normal Doppler findings. AORTA: The root exhibited normal size. PERICARDIUM: No significant pericardial effusion identified.     TRANSESOPHAGEAL ECHO: March 2013:  LEFT VENTRICLE: The ventricle was dilated. Systolic function was normal. Ejection fraction was estimated in the range of 55 % to 60 %. There were no regional wall motion abnormalities. RIGHT VENTRICLE: The size was normal. Systolic function was normal. DOPPLER: Systolic pressure was at the upper limits of normal. Estimated peak pressure was 35 mmHg. LEFT ATRIUM: The atrium was dilated. No thrombus was identified. APPENDAGE: The size was normal. No thrombus was identified. DOPPLER: The function was normal (normal emptying velocity). ATRIAL SEPTUM: No defect or patent foramen ovale was identified. Contrast injection was performed. There was no right-to-left shunt, with provocative maneuvers to increase right atrial pressure. RIGHT ATRIUM: Size was normal. No thrombus was identified. MITRAL VALVE: Normal valve structure. There was normal leaflet separation. No obvious mass, vegetation or thrombus noted. DOPPLER: There was no evidence for stenosis. There was trivial regurgitation. AORTIC VALVE: The valve was trileaflet. Leaflets exhibited normal thickness and normal cuspal separation. No obvious mass, vegetation or thrombus noted. DOPPLER: There was no stenosis. There was no regurgitation. TRICUSPID VALVE: Normal valve structure. There was normal leaflet separation. No obvious mass, vegetation or thrombus noted. DOPPLER: There was mild regurgitation. PULMONIC VALVE: Leaflets exhibited normal thickness, no calcification, and normal cuspal separation. No obvious mass, vegetation or thrombus noted. DOPPLER: There was no regurgitation. AORTA: The root exhibited normal size. There was no atheroma. There was no evidence for dissection. There was no evidence for aneurysm. PERICARDIUM: There was no pericardial effusion. STRESS TEST (EST, PHARM, NUC, ECHO etc): January 2013:  1. Technically difficult study. 2. Anterior perfusion defect consistent with soft tissue attenuation defect.   3. No regional wall motion abnormalities. 4. Normal systolic functioning. Ejection fraction 60%. CATHETERIZATION:       Impression / Plan:     Hypertension    Dyslipidemia    Atrial flutter    Symptomatic bradycardia       HTN:  BP today in office 136/76 mm Hg  Continue HCTZ and Losartan. Stable    HLD:  Currently on atorvastatin 10 mg daily. Being managed by PCP regularly per patient. Will defer to PCP    A. Flutter:  She underwent flutter ablation in March, 2013. Today she does not report any recurrent awareness of palpitations. She is being paced. EKG confirmed that. Otherwise her CHADS score remains calculated at 1. She is anticoagulated with aspirin. Other than the above, or unless any additional issues arise, I have asked that she follow up in six months.

## 2017-08-15 NOTE — PROGRESS NOTES
Subjective:      Melodie Franklin is in the office today for cardiac reevaluation. She is a 49-year-old woman with hypertension, history of atrial flutter with symptomatic bradycardia, and prior atrial flutter ablation, as well as permanent pacemaker implantation in March of 2015. She was recently admitted to [facility] with increasing shortness of breath and chest pressure. She was felt to have acute on chronic systolic failure. Her failure resolved fairly quickly with intravenous diuretics with a normalization of her chest x-ray, as well as her BNP. She is in the office today for followup in that regard. In the office today, the patient says she feels much better. Her breathing is much improved. She also has obstructive sleep apnea and wears a CPAP. She has been weighing daily. She feels as if she is ready to return to work some time soon. Patient Active Problem List    Diagnosis Date Noted    JOSÉ MIGUEL (obstructive sleep apnea) 08/14/2017    Acute on chronic systolic congestive heart failure ([facility] [address] 07/20/2017    Bronchiolitis 07/20/2017    Chest pain 07/19/2017    Cardiac pacemaker in situ 02/25/2016    Hypertension     Dyslipidemia     Atrial flutter      Symptomatic bradycardia      Current Outpatient Prescriptions   Medication Sig Dispense Refill    losartan (COZAAR) 25 mg tablet take 1 tablet by mouth once daily  0    atorvastatin (LIPITOR) 10 mg tablet   0    aspirin 81 mg chewable tablet Take 1 Tab by mouth daily. 90 Tab 3    buPROPion XL (WELLBUTRIN XL) 150 mg tablet Take 150 mg by mouth every morning.  CALCIUM PO Take 1,200 mg by mouth daily.  multivitamin, stress formula (STRESS TAB) tablet Take 1 Tab by mouth daily.  cholecalciferol (VITAMIN D3) 1,000 unit tablet Take  by mouth daily.  omega-3 fatty acids-vitamin e (FISH OIL) 1,000 mg cap Take 1 Cap by mouth.         Desvenlafaxine SR (PRISTIQ) 50 mg tablet Take 50 mg by mouth daily. Indications: GENERALIZED ANXIETY DISORDER      bumetanide (BUMEX) 1 mg tablet Take 1 Tab by mouth daily. 30 Tab 9    carvedilol (COREG) 6.25 mg tablet Take 1 Tab by mouth two (2) times daily (with meals). 61 Tab 9     No Known Allergies  Past Medical History:   Diagnosis Date    Atrial flutter     CHADS score 1  (-CHF, +HTN, -AGE, -DM, -CVA)    Atrial flutter ablation     3/13    Cardiomyopathy ([facility] [address]     LVEF 30% (03/17)    Dyslipidemia     Hypertension     Obstructive sleep apnea     CPAP    Pacemaker     3/13 ([employer])     Past Surgical History:   Procedure Laterality Date    HX HYSTERECTOMY      ? years ago    HX OOPHORECTOMY       Family History   Problem Relation Age of Onset    Breast Cancer Sister 59     History   Smoking Status    Never Smoker   Smokeless Tobacco    Never Used          Review of Systems, additional:  Constitutional: negative  Eyes: negative  Respiratory: negative  Cardiovascular: negative  Gastrointestinal: negative  Musculoskeletal:negative  Neurological: negative  Behvioral/Psych: negative  Endocrine: negative  ENT: negative    Objective:     Visit Vitals    Pulse 84    Ht 5' 2\" (1.575 m)    Wt 200 lb (90.7 kg)    SpO2 95%    BMI 36.58 kg/m2     General:  alert, cooperative, no distress   Chest Wall: inspection normal - no chest wall deformities or tenderness, respiratory effort normal   Lung: clear to auscultation bilaterally   Heart:  normal rate and regular rhythm, no murmurs noted, no gallops noted   Abdomen: soft, non-tender. Bowel sounds normal. No masses,  no organomegaly   Extremities: extremities normal, atraumatic, no cyanosis or edema Skin: no rashes   Neuro: alert, oriented, normal speech, no focal findings or movement disorder noted         Assessment/Plan:       ICD-10-CM ICD-9-CM    1. Acute on chronic systolic congestive heart failure ([facility] [address] symptomatically improved. Patient can RT to work. RT 6 weeks.  I50.23 428.23      428.0    2. Typical atrial flutter ([facility] [address] S/P ablation 03/2013. CHADS score 1 I48.3 427.32    3. Hypertension, controlled in office today. I11.9 402.10    4. Cardiac pacemaker in situ, [employer] 03/2013 Z95.0 V45.01    5. Chest pain, unspecified type R07.9 786.50    6.  Dyslipidemia E78.5 272.4    7. JOSÉ MIGUEL (obstructive sleep apnea) G47.33 327.23

## 2017-08-25 ENCOUNTER — TELEPHONE (OUTPATIENT)
Dept: CARDIOLOGY CLINIC | Age: 62
End: 2017-08-25

## 2017-09-20 ENCOUNTER — OFFICE VISIT (OUTPATIENT)
Dept: CARDIOLOGY CLINIC | Age: 62
End: 2017-09-20

## 2017-09-20 VITALS
HEIGHT: 62 IN | OXYGEN SATURATION: 97 % | DIASTOLIC BLOOD PRESSURE: 65 MMHG | BODY MASS INDEX: 35.33 KG/M2 | HEART RATE: 72 BPM | WEIGHT: 192 LBS | SYSTOLIC BLOOD PRESSURE: 113 MMHG

## 2017-09-20 DIAGNOSIS — I48.3 TYPICAL ATRIAL FLUTTER (HCC): ICD-10-CM

## 2017-09-20 DIAGNOSIS — I50.23 ACUTE ON CHRONIC SYSTOLIC CONGESTIVE HEART FAILURE (HCC): Primary | ICD-10-CM

## 2017-09-20 DIAGNOSIS — E78.5 DYSLIPIDEMIA: ICD-10-CM

## 2017-09-20 DIAGNOSIS — Z95.0 CARDIAC PACEMAKER IN SITU: ICD-10-CM

## 2017-09-20 DIAGNOSIS — R00.1 SYMPTOMATIC BRADYCARDIA: ICD-10-CM

## 2017-09-20 DIAGNOSIS — J21.9 BRONCHIOLITIS: ICD-10-CM

## 2017-09-20 DIAGNOSIS — I11.9 BENIGN HYPERTENSIVE HEART DISEASE WITHOUT HEART FAILURE: ICD-10-CM

## 2017-09-20 NOTE — MR AVS SNAPSHOT
Visit Information Date & Time Provider Department Dept. Phone Encounter #  
 9/20/2017  9:15 AM Lavonia Schilder,  [facility] Specialist at [facility] [phone] [id number] Follow-up Instructions Return in about 3 months (around 12/20/2017). Your Appointments 10/11/2017  4:00 PM  
CARELINK with Ru[facility] Ald[facility] [place] ([facility]) Appt Note: 3 month f/u after July -[facility] [place] [place] [id number]  
[phone] [address] Upcoming Health Maintenance Date Due Hepatitis C Screening 1955 Pneumococcal 19-64 Medium Risk (1 of 1 - PPSV23) 12/25/1972 DTaP/Tdap/Td series (1 - Tdap) 12/25/1974 FOBT Q 1 YEAR AGE 50-75 12/25/2003 ZOSTER VACCINE AGE 60> 10/25/2013 PAP AKA CERVICAL CYTOLOGY 3/21/2015 INFLUENZA AGE 9 TO ADULT 8/1/2017 BREAST CANCER SCRN MAMMOGRAM 12/28/2018 Allergies as of 9/20/2017  Review Complete On: 9/20/2017 By: Stefanie Salmeron LPN No Known Allergies Current Immunizations  Never Reviewed No immunizations on file. Not reviewed this visit Vitals BP Pulse Height(growth percentile) Weight(growth percentile) SpO2 BMI  
 113/65 72 5' 2\" (1.575 m) 192 lb (87.1 kg) 97% 35.12 kg/m2 OB Status Smoking Status Hysterectomy Never Smoker BMI and BSA Data Body Mass Index Body Surface Area  
 35.12 kg/m 2 1.95 m 2 Preferred Pharmacy Pharmacy Name Phone [facility] - [address], [address] [phone] Your Updated Medication List  
  
   
This list is accurate as of: 9/20/17  9:35 AM.  Always use your most recent med list.  
  
  
  
  
 aspirin 81 mg chewable tablet Take 1 Tab by mouth daily. atorvastatin 10 mg tablet Commonly known as:  LIPITOR  
  
 bumetanide 1 mg tablet Commonly known as:  Ally Flores Take 1 Tab by mouth daily. CALCIUM PO Take 1,200 mg by mouth daily. carvedilol 6.25 mg tablet Commonly known as:  Enmamelissa Bryan Take 1 Tab by mouth two (2) times daily (with meals). FISH OIL 1,000 mg Cap Generic drug:  omega-3 fatty acids-vitamin e Take 1 Cap by mouth.  
  
 losartan 25 mg tablet Commonly known as:  COZAAR  
take 1 tablet by mouth once daily  
  
 multivitamin, stress formula tablet Commonly known as:  STRESS TAB Take 1 Tab by mouth daily. PRISTIQ 50 mg ER tablet Generic drug:  desvenlafaxine succinate Take 50 mg by mouth daily. Indications: GENERALIZED ANXIETY DISORDER  
  
 VITAMIN D3 1,000 unit tablet Generic drug:  cholecalciferol Take  by mouth daily. WELLBUTRIN  mg tablet Generic drug:  buPROPion XL Take 150 mg by mouth every morning. Follow-up Instructions Return in about 3 months (around 12/20/2017). Introducing [facility] & HEALTH SERVICES! Dear Lamont meyer: Thank you for requesting a Mover account. Our records indicate that you already have an active Mover account. You can access your account anytime at [email or web address] Did you know that you can access your hospital and ER discharge instructions at any time in Mover? You can also review all of your test results from your hospital stay or ER visit. Additional Information If you have questions, please visit the Frequently Asked Questions section of the Mover website at [email or web address] Remember, Mover is NOT to be used for urgent needs. For medical emergencies, dial 911. Now available from your iPhone and Android! Please provide this summary of care documentation to your next provider. Your primary care clinician is listed as CATHY Villegas. If you have any questions after today's visit, please call [phone].

## 2017-09-20 NOTE — PROGRESS NOTES
1. Have you been to the ER, urgent care clinic since your last visit? Hospitalized since your last visit? No    2. Have you seen or consulted any other health care providers outside of the [address] [place] [address] since your last visit? Include any pap smears or colon screening.  No

## 2017-09-24 NOTE — PROGRESS NOTES
Subjective:      Eleni Prader is in the office today for cardiac reevaluation. She is a 78-year-old woman with hypertension, history of atrial flutter with symptomatic bradycardia, and prior atrial flutter ablation, as well as permanent pacemaker implantation in March of 2015. She was admitted to [facility] in 06/2017 with increasing shortness of breath and chest pressure. She was felt to have acute on chronic systolic failure. Her failure resolved fairly quickly with intravenous diuretics with a normalization of her chest x-ray, as well as her BNP. She is in the office today for a second follow up visit followup in that regard. In the office today, the patient says she continues to feel much better. She saw her Pulmonologist recently and was prescribed Asmanex inhaler which she believes has helped. .  She also has obstructive sleep apnea and wears a CPAP. Patient Active Problem List    Diagnosis Date Noted    JOSÉ MIGUEL (obstructive sleep apnea) 08/14/2017    Acute on chronic systolic congestive heart failure ([facility] [address] 07/20/2017    Bronchiolitis 07/20/2017    Chest pain 07/19/2017    Cardiac pacemaker in situ 02/25/2016    Hypertension     Dyslipidemia     Atrial flutter      Symptomatic bradycardia      Current Outpatient Prescriptions   Medication Sig Dispense Refill    bumetanide (BUMEX) 1 mg tablet Take 1 Tab by mouth daily. 30 Tab 9    carvedilol (COREG) 6.25 mg tablet Take 1 Tab by mouth two (2) times daily (with meals). 60 Tab 9    losartan (COZAAR) 25 mg tablet take 1 tablet by mouth once daily  0    atorvastatin (LIPITOR) 10 mg tablet   0    aspirin 81 mg chewable tablet Take 1 Tab by mouth daily. 90 Tab 3    buPROPion XL (WELLBUTRIN XL) 150 mg tablet Take 150 mg by mouth every morning.  CALCIUM PO Take 1,200 mg by mouth daily.  multivitamin, stress formula (STRESS TAB) tablet Take 1 Tab by mouth daily.         cholecalciferol (VITAMIN D3) 1,000 unit tablet Take  by mouth daily.  omega-3 fatty acids-vitamin e (FISH OIL) 1,000 mg cap Take 1 Cap by mouth.  Desvenlafaxine SR (PRISTIQ) 50 mg tablet Take 50 mg by mouth daily. Indications: GENERALIZED ANXIETY DISORDER       No Known Allergies  Past Medical History:   Diagnosis Date    Atrial flutter     CHADS score 1  (-CHF, +HTN, -AGE, -DM, -CVA)    Atrial flutter ablation     3/13    Cardiomyopathy ([facility] [address]     LVEF 30% (03/17)    Dyslipidemia     Hypertension     Obstructive sleep apnea     CPAP    Pacemaker     3/13 (MEDTRONIC)     Past Surgical History:   Procedure Laterality Date    HX HYSTERECTOMY      ? years ago    HX OOPHORECTOMY       Family History   Problem Relation Age of Onset    Breast Cancer Sister 59     History   Smoking Status    Never Smoker   Smokeless Tobacco    Never Used          Review of Systems, additional:  Constitutional: negative  Eyes: negative  Respiratory: negative  Cardiovascular: negative  Gastrointestinal: negative  Musculoskeletal:negative  Neurological: negative  Behvioral/Psych: negative  Endocrine: negative  ENT: negative    Objective:     Visit Vitals    /65    Pulse 72    Ht 5' 2\" (1.575 m)    Wt 192 lb (87.1 kg)    SpO2 97%    BMI 35.12 kg/m2     General:  alert, cooperative, no distress   Chest Wall: inspection normal - no chest wall deformities or tenderness, respiratory effort normal   Lung: clear to auscultation bilaterally   Heart:  normal rate and regular rhythm, no murmurs noted, no gallops noted   Abdomen: soft, non-tender. Bowel sounds normal. No masses,  no organomegaly   Extremities: extremities normal, atraumatic, no cyanosis or edema Skin: no rashes   Neuro: alert, oriented, normal speech, no focal findings or movement disorder noted         Assessment/Plan:       ICD-10-CM ICD-9-CM    1. Acute on chronic systolic congestive heart failure ([facility] [address] stable. . RT 3 months I50.23 428.23      428.0    2.  Typical atrial flutter ([facility] [address] S/P ablation 03/2013. CHADS score 1 I48.3 427.32    3. Hypertension, controlled in office today. I11.9 402.10    4. Cardiac pacemaker in situ, Medtronic 03/2013 Z95.0 V45.01    5. Chest pain, unspecified type R07.9 786.50    6.  Dyslipidemia E78.5 272.4    7. JOSÉ MIGUEL (obstructive sleep apnea) G47.33 327.23

## 2017-12-20 ENCOUNTER — OFFICE VISIT (OUTPATIENT)
Dept: CARDIOLOGY CLINIC | Age: 62
End: 2017-12-20

## 2017-12-20 VITALS
WEIGHT: 176 LBS | BODY MASS INDEX: 32.39 KG/M2 | DIASTOLIC BLOOD PRESSURE: 66 MMHG | HEART RATE: 63 BPM | OXYGEN SATURATION: 97 % | HEIGHT: 62 IN | SYSTOLIC BLOOD PRESSURE: 115 MMHG

## 2017-12-20 DIAGNOSIS — Z95.0 CARDIAC PACEMAKER IN SITU: ICD-10-CM

## 2017-12-20 DIAGNOSIS — I50.23 ACUTE ON CHRONIC SYSTOLIC CONGESTIVE HEART FAILURE (HCC): ICD-10-CM

## 2017-12-20 DIAGNOSIS — R00.1 SYMPTOMATIC BRADYCARDIA: ICD-10-CM

## 2017-12-20 DIAGNOSIS — R07.9 CHEST PAIN, UNSPECIFIED TYPE: ICD-10-CM

## 2017-12-20 DIAGNOSIS — E78.5 DYSLIPIDEMIA: ICD-10-CM

## 2017-12-20 DIAGNOSIS — I48.3 TYPICAL ATRIAL FLUTTER (HCC): ICD-10-CM

## 2017-12-20 DIAGNOSIS — I11.9 BENIGN HYPERTENSIVE HEART DISEASE WITHOUT HEART FAILURE: Primary | ICD-10-CM

## 2017-12-20 NOTE — MR AVS SNAPSHOT
Visit Information Date & Time Provider Department Dept. Phone Encounter #  
 12/20/2017  9:30 AM Cindy Casarez MD [address] [place] [address] Specialist at [place] [address] [id number] Follow-up Instructions Return in about 4 months (around 4/20/2018). Your Appointments 1/10/2018  9:15 AM  
PROCEDURE with Ruiz Schroeder [facility] Specialist at [address]) Appt Note: medtronic [address] [place]  
  
   
 [address] 4/18/2018  9:30 AM  
Follow Up with Cindy Casarez MD  
Cardio Specialist at [address]) Appt Note: 4 months follow up  
 [address]  
  
   
 [address] Upcoming Health Maintenance Date Due Hepatitis C Screening 1955 Pneumococcal 19-64 Medium Risk (1 of 1 - PPSV23) 12/25/1972 DTaP/Tdap/Td series (1 - Tdap) 12/25/1974 FOBT Q 1 YEAR AGE 50-75 12/25/2003 ZOSTER VACCINE AGE 60> 10/25/2013 PAP AKA CERVICAL CYTOLOGY 3/21/2015 Influenza Age 5 to Adult 8/1/2017 Allergies as of 12/20/2017  Review Complete On: 12/20/2017 By: Blessing Byers LPN No Known Allergies Current Immunizations  Never Reviewed No immunizations on file. Not reviewed this visit Vitals BP Pulse Height(growth percentile) Weight(growth percentile) SpO2 BMI  
 115/66 63 5' 2\" (1.575 m) 176 lb (79.8 kg) 97% 32.19 kg/m2 OB Status Smoking Status Hysterectomy Never Smoker BMI and BSA Data Body Mass Index Body Surface Area  
 32.19 kg/m 2 1.87 m 2 Preferred Pharmacy Pharmacy Name Phone [facility] - [address], [address] [phone] Your Updated Medication List  
  
   
 This list is accurate as of: 12/20/17 10:07 AM.  Always use your most recent med list.  
  
  
  
  
 aspirin 81 mg chewable tablet Take 1 Tab by mouth daily. atorvastatin 10 mg tablet Commonly known as:  LIPITOR  
  
 bumetanide 1 mg tablet Commonly known as:  Garlon Salen Take 1 Tab by mouth daily. CALCIUM PO Take 1,200 mg by mouth daily. carvedilol 6.25 mg tablet Commonly known as:  Gillermina Begun Take 1 Tab by mouth two (2) times daily (with meals). FISH OIL 1,000 mg Cap Generic drug:  omega-3 fatty acids-vitamin e Take 1 Cap by mouth.  
  
 losartan 25 mg tablet Commonly known as:  COZAAR  
take 1 tablet by mouth once daily  
  
 multivitamin, stress formula tablet Commonly known as:  STRESS TAB Take 1 Tab by mouth daily. PRISTIQ 50 mg ER tablet Generic drug:  desvenlafaxine succinate Take 50 mg by mouth daily. Indications: GENERALIZED ANXIETY DISORDER  
  
 VITAMIN D3 1,000 unit tablet Generic drug:  cholecalciferol Take  by mouth daily. WELLBUTRIN  mg tablet Generic drug:  buPROPion XL Take 150 mg by mouth every morning. Follow-up Instructions Return in about 4 months (around 4/20/2018). To-Do List   
 12/29/2017 7:15 AM  
  Appointment with [facility] LENIN Ruggiero 9 RM 1 at [facility] ([phone]) PAYMENT  For Non-Medicare patients - $15.00 will be collected from you at the time of your exam.  You will be billed $35.00 from the reading [facility]iologist [facility]. OUTSIDE FILMS  - Any outside films related to the study being scheduled should be brought with you on the day of the exam.  If this cannot be done there may be a delay in the reading of the study.   MEDICATIONS  - Patient must bring a complete list of all medications currently taking to include prescriptions, over-the-counter meds, herbals, vitamins & any dietary supplements  GENERAL INSTRUCTIONS  - On the day of your exam do not use any bath powder, deodorant or lotions on the armpit area. -Tenderness of breasts may cause an increase of discomfort during procedure. If you are experiencing breast tenderness on the day of your appointment and would like to reschedule, please call [phone].   
  
 02/05/2018 9:15 AM  
  Appointment with [facility] CT RM 2 at [facility] RAD CT ([phone]) GENERAL INSTRUCTIONS  This study does not require you to drink contrast prior to your study. RELATED STUDY INFORMATION  Bring any films, CDs, and reports related with you on the day of your exam.  This only includes studies done outside of [address] [place] [address], [facility], [facility] , and [place]. QUESTIONS  Notify the CT Department if you have any questions concerning your study. [facility] [address] - [phone] [facility] [place] - [phone] Introducing [facility] & [facility] SERVICES! Dear Bakari Barnett: Thank you for requesting a Quantapore account. Our records indicate that you already have an active Quantapore account. You can access your account anytime at [email or web address] Did you know that you can access your hospital and ER discharge instructions at any time in Quantapore? You can also review all of your test results from your hospital stay or ER visit. Additional Information If you have questions, please visit the Frequently Asked Questions section of the Quantapore website at [email or web address] Remember, Quantapore is NOT to be used for urgent needs. For medical emergencies, dial 911. Now available from your iPhone and Android! Please provide this summary of care documentation to your next provider. Your primary care clinician is listed as CATHY Cheema. If you have any questions after today's visit, please call [phone].

## 2017-12-20 NOTE — PROGRESS NOTES
1. Have you been to the ER, urgent care clinic since your last visit? Hospitalized since your last visit? No    2. Have you seen or consulted any other health care providers outside of the [address] [place] [address] since your last visit? Include any pap smears or colon screening.  No

## 2017-12-29 ENCOUNTER — HOSPITAL ENCOUNTER (OUTPATIENT)
Dept: MAMMOGRAPHY | Age: 62
Discharge: HOME | End: 2017-12-29
Attending: FAMILY MEDICINE
Payer: COMMERCIAL

## 2017-12-29 DIAGNOSIS — Z12.31 VISIT FOR SCREENING MAMMOGRAM: ICD-10-CM

## 2017-12-29 PROCEDURE — 77063 BREAST TOMOSYNTHESIS BI: CPT

## 2017-12-29 NOTE — PROGRESS NOTES
Subjective:      Baljit Enriquez is in the office today for cardiac reevaluation. She is a 67-year-old woman with hypertension, history of atrial flutter with symptomatic bradycardia, and prior atrial flutter ablation, as well as permanent pacemaker implantation in March of 2015. She was admitted to [facility] in 06/2017 with increasing shortness of breath and chest pressure. She was felt to have acute on chronic systolic failure. Her failure resolved fairly quickly with intravenous diuretics with a normalization of her chest x-ray, as well as her BNP. She is in the office today for a third follow up visit followup in that regard. In the office today, the patient says she continues to feel much better. She was having some wheezing and coughing. She saw her Pulmonologist, Dr Tyler Belle,  recently and was prescribed an additional inhaler which she believes has helped. She continues to do well with her weight management. She has lost 16 lbs since her 9/20/2017 visit. Patient Active Problem List    Diagnosis Date Noted    JOSÉ MIGUEL (obstructive sleep apnea) 08/14/2017    Acute on chronic systolic congestive heart failure ([facility] [address] 07/20/2017    Bronchiolitis 07/20/2017    Chest pain 07/19/2017    Cardiac pacemaker in situ 02/25/2016    Hypertension     Dyslipidemia     Atrial flutter      Symptomatic bradycardia      Current Outpatient Prescriptions   Medication Sig Dispense Refill    bumetanide (BUMEX) 1 mg tablet Take 1 Tab by mouth daily. 30 Tab 9    carvedilol (COREG) 6.25 mg tablet Take 1 Tab by mouth two (2) times daily (with meals). 60 Tab 9    losartan (COZAAR) 25 mg tablet take 1 tablet by mouth once daily  0    atorvastatin (LIPITOR) 10 mg tablet   0    aspirin 81 mg chewable tablet Take 1 Tab by mouth daily. 90 Tab 3    buPROPion XL (WELLBUTRIN XL) 150 mg tablet Take 150 mg by mouth every morning.  CALCIUM PO Take 1,200 mg by mouth daily.         multivitamin, stress formula (STRESS TAB) tablet Take 1 Tab by mouth daily.  cholecalciferol (VITAMIN D3) 1,000 unit tablet Take  by mouth daily.  omega-3 fatty acids-vitamin e (FISH OIL) 1,000 mg cap Take 1 Cap by mouth.  Desvenlafaxine SR (PRISTIQ) 50 mg tablet Take 50 mg by mouth daily. Indications: GENERALIZED ANXIETY DISORDER       No Known Allergies  Past Medical History:   Diagnosis Date    Atrial flutter     CHADS score 1  (-CHF, +HTN, -AGE, -DM, -CVA)    Atrial flutter ablation     3/13    Cardiomyopathy ([address]     LVEF 30% (03/17)    Dyslipidemia     Hypertension     Obstructive sleep apnea     CPAP    Pacemaker     3/13 (MEDTRONIC)     Past Surgical History:   Procedure Laterality Date    HX HYSTERECTOMY      ? years ago    HX OOPHORECTOMY       Family History   Problem Relation Age of Onset    Breast Cancer Sister 59     History   Smoking Status    Never Smoker   Smokeless Tobacco    Never Used          Review of Systems, additional:  Constitutional: negative  Eyes: negative  Respiratory: negative  Cardiovascular: negative  Gastrointestinal: negative  Musculoskeletal:negative  Neurological: negative  Behvioral/Psych: negative  Endocrine: negative  ENT: negative    Objective:     Visit Vitals    /66    Pulse 63    Ht 5' 2\" (1.575 m)    Wt 176 lb (79.8 kg)    SpO2 97%    BMI 32.19 kg/m2     General:  alert, cooperative, no distress   Chest Wall: inspection normal - no chest wall deformities or tenderness, respiratory effort normal   Lung: clear to auscultation bilaterally   Heart:  normal rate and regular rhythm, no murmurs noted, no gallops noted   Abdomen: soft, non-tender. Bowel sounds normal. No masses,  no organomegaly   Extremities: extremities normal, atraumatic, no cyanosis or edema Skin: no rashes   Neuro: alert, oriented, normal speech, no focal findings or movement disorder noted         Assessment/Plan:       ICD-10-CM ICD-9-CM    1.  Acute on chronic systolic congestive heart failure ([facility] [address] stable. RT 4 months I50.23 428.23      428.0    2. Typical atrial flutter ([facility] [address] S/P ablation 03/2013. CHADS score 1 I48.3 427.32    3. Hypertension, well controlled in office today. I11.9 402.10    4. Cardiac pacemaker in situ, Medtronic 03/2013 Z95.0 V45.01    5. Chest pain, unspecified type R07.9 786.50    6.  Dyslipidemia E78.5 272.4    7. JOSÉ MIGUEL (obstructive sleep apnea) G47.33 327.23

## 2018-01-10 ENCOUNTER — TELEPHONE (OUTPATIENT)
Dept: CARDIOLOGY CLINIC | Age: 63
End: 2018-01-10

## 2018-01-24 ENCOUNTER — CLINICAL SUPPORT (OUTPATIENT)
Dept: CARDIOLOGY CLINIC | Age: 63
End: 2018-01-24

## 2018-01-24 DIAGNOSIS — I48.3 TYPICAL ATRIAL FLUTTER (HCC): Primary | ICD-10-CM

## 2018-01-24 DIAGNOSIS — R00.1 SYMPTOMATIC BRADYCARDIA: ICD-10-CM

## 2018-01-24 DIAGNOSIS — Z95.0 CARDIAC PACEMAKER IN SITU: ICD-10-CM

## 2018-01-24 NOTE — TELEPHONE ENCOUNTER
Per Dr. Carmin Mcburney, patient was in office for device check and will start Eliquis 5 mg BID. Patient made aware and follow up appointment was made with Dr. Carmin Mcburney and patient was advised she will need to see Dr. Mendel Hartman as well. Patient verbalized understanding and agrees with plan at this time. Patient was advised to call office if she has any further questions or concerns.       Verbal order and read back per Julien Reid MD

## 2018-02-05 ENCOUNTER — HOSPITAL ENCOUNTER (OUTPATIENT)
Dept: CT IMAGING | Age: 63
Discharge: HOME | End: 2018-02-05
Attending: INTERNAL MEDICINE
Payer: COMMERCIAL

## 2018-02-05 DIAGNOSIS — J84.178 OTHER INTERSTITIAL PULMONARY DISEASES WITH FIBROSIS IN DISEASES CLASSIFIED ELSEWHERE (HCC): ICD-10-CM

## 2018-02-05 DIAGNOSIS — J84.10 PULMONARY FIBROSIS, UNSPECIFIED (HCC): ICD-10-CM

## 2018-02-05 PROCEDURE — 71250 CT THORAX DX C-: CPT

## 2018-02-21 ENCOUNTER — OFFICE VISIT (OUTPATIENT)
Dept: CARDIOLOGY CLINIC | Age: 63
End: 2018-02-21

## 2018-02-21 VITALS
HEART RATE: 68 BPM | BODY MASS INDEX: 30.73 KG/M2 | HEIGHT: 62 IN | WEIGHT: 167 LBS | OXYGEN SATURATION: 94 % | DIASTOLIC BLOOD PRESSURE: 66 MMHG | SYSTOLIC BLOOD PRESSURE: 112 MMHG

## 2018-02-21 VITALS
OXYGEN SATURATION: 94 % | DIASTOLIC BLOOD PRESSURE: 66 MMHG | HEIGHT: 62 IN | WEIGHT: 167 LBS | HEART RATE: 68 BPM | BODY MASS INDEX: 30.73 KG/M2 | SYSTOLIC BLOOD PRESSURE: 112 MMHG

## 2018-02-21 DIAGNOSIS — I48.0 PAROXYSMAL ATRIAL FIBRILLATION (HCC): Primary | ICD-10-CM

## 2018-02-21 DIAGNOSIS — R06.09 DOE (DYSPNEA ON EXERTION): ICD-10-CM

## 2018-02-21 DIAGNOSIS — Z95.0 CARDIAC PACEMAKER IN SITU: ICD-10-CM

## 2018-02-21 DIAGNOSIS — I10 ESSENTIAL HYPERTENSION: ICD-10-CM

## 2018-02-21 DIAGNOSIS — I42.9 CARDIOMYOPATHY, UNSPECIFIED TYPE (HCC): ICD-10-CM

## 2018-02-21 DIAGNOSIS — I11.9 BENIGN HYPERTENSIVE HEART DISEASE WITHOUT HEART FAILURE: ICD-10-CM

## 2018-02-21 DIAGNOSIS — I11.9 BENIGN HYPERTENSIVE HEART DISEASE WITHOUT HEART FAILURE: Primary | ICD-10-CM

## 2018-02-21 DIAGNOSIS — R00.1 SYMPTOMATIC BRADYCARDIA: ICD-10-CM

## 2018-02-21 DIAGNOSIS — G47.33 OSA (OBSTRUCTIVE SLEEP APNEA): ICD-10-CM

## 2018-02-21 NOTE — PROGRESS NOTES
History of Present Illness: A 57 y.o. female referred by Dr. Blake Sullivan for evaluation for atrial fibrillation. She has a history of atrial flutter dating back to 2013 as well as a pacemaker in 2015. She has been doing relatively well. She had a heart failure exacerbation July 2017 and ejection fraction was noted to be in the 30's. She has been taking diuretics, controlling her blood pressure and monitoring salt and has lost almost 40 pounds. She has palpitations from time to time and she seems to be more shortness of breath. She is followed by Dr. Samson Morgan from pulmonary for her underlying bronchial disease. She denies syncope, chest pain, PND, orthopnea. She is on Bumex, AV blocker and ARB. She is here to discuss options. Impression/Plan:   Paroxsymal symptomatic atrial fibrillation with symptoms of dyspnea and congestive heart failure. Recent cardiomyopathy diagnosed July 2017 with EF in the 30's. Chronic systolic heart failure, improved on Bumex. Chronic ARB and beta-blocker therapy. Medtronic pacemaker 2013 with normal function placed for symptomatic bradycardia. Obstructive sleep apnea on CPAP. Hypertension, improved. Dyslipidemia. Chronic Eliquis use. I discussed potential treatment options for her atrial fibrillation. She does have some symptoms of congestive heart failure. It is unclear if the congestive heart failure is a trigger for the atrial fibrillation or vice versa. I discussed this at length. My plan at this time would be to obtain a transthoracic echocardiogram and based on her ejection fraction and left atrial size make a final decision about atrial fibrillation ablation. If there is a reasonable chance of success, she would like to proceed. Risks, benefits, alternatives discussed. All questions answered. If her EF remains less than 35%, I would consider stress test and potential AICD upgrade of her pacemaker.      Thank you kindly for allowing me to participate in this patient's care.       Total care time spent in reviewing the case, multiple EMR databases, physician notes, procedure notes, examining the patient, and documentation, is 60 minutes.      Discussed in details with patient at bedside. All questions were answered to their full satisfaction. Risk, benefit and alternatives were discussed. More than 50% time spent in counseling and coordination of care. Past Medical History:   Diagnosis Date    Atrial flutter     CHADS score 1  (-CHF, +HTN, -AGE, -DM, -CVA)    Atrial flutter ablation     3/13    Cardiomyopathy ([facility] [facility]     LVEF 30% (03/17)    Dyslipidemia     Hypertension     Obstructive sleep apnea     CPAP    Pacemaker     3/13 (MEDTRONIC)       Current Outpatient Prescriptions   Medication Sig Dispense Refill    apixaban (ELIQUIS) 5 mg tablet Take 1 Tab by mouth two (2) times a day. 60 Tab 6    bumetanide (BUMEX) 1 mg tablet Take 1 Tab by mouth daily. 30 Tab 9    carvedilol (COREG) 6.25 mg tablet Take 1 Tab by mouth two (2) times daily (with meals). 60 Tab 9    losartan (COZAAR) 25 mg tablet take 1 tablet by mouth once daily  0    atorvastatin (LIPITOR) 10 mg tablet   0    buPROPion XL (WELLBUTRIN XL) 150 mg tablet Take 150 mg by mouth every morning.  CALCIUM PO Take 1,200 mg by mouth daily.  multivitamin, stress formula (STRESS TAB) tablet Take 1 Tab by mouth daily.  cholecalciferol (VITAMIN D3) 1,000 unit tablet Take  by mouth daily.  omega-3 fatty acids-vitamin e (FISH OIL) 1,000 mg cap Take 1 Cap by mouth.  Desvenlafaxine SR (PRISTIQ) 50 mg tablet Take 50 mg by mouth daily. Indications: GENERALIZED ANXIETY DISORDER         Social History   reports that she has never smoked. She has never used smokeless tobacco.   reports that she drinks alcohol. Family History  family history includes Breast Cancer (age of onset: 59) in her sister.     Review of Systems  Except as stated above include:  Constitutional: Negative for fever, chills and malaise/fatigue. HEENT: No congestion or recent URI. Gastrointestinal: No nausea, vomiting, abdominal pain, bloody stools. Pulmonary:  Negative except as stated above. Cardiac:  Negative except as stated above. Musculoskeletal: Negative except as stated above. Neurological:  No localized symptoms. Skin:  Negative except as stated above. Psych:  No mood swings. Endocrine:  No heat/cold intolerance. PHYSICAL EXAM  BP Readings from Last 3 Encounters:   02/21/18 112/66   02/21/18 112/66   12/20/17 115/66     Pulse Readings from Last 3 Encounters:   02/21/18 68   02/21/18 68   12/20/17 63     Wt Readings from Last 3 Encounters:   02/21/18 75.8 kg (167 lb)   02/21/18 75.8 kg (167 lb)   12/20/17 79.8 kg (176 lb)     General:   Well developed, well groomed. Head/Neck:   No jugular venous distention     No carotid bruits. No evidence of xanthelasma. Lungs:   No respiratory distress. Clear bilaterally. Heart:    Regular rate and rhythm. Normal S1/S2. Palpation of heart with normal point of maximum impulse. No significant murmurs, rubs or gallops. Pacer pocket intact. Abdomen:   Soft and nontender. No palpable abdominal mass or bruits. Extremities:   Intact peripheral pulses. No significant edema. Neurological:   Alert and oriented to person, place, time. No focal neurological deficit visually.     Blood Pressure Metric:  controlled

## 2018-02-21 NOTE — MR AVS SNAPSHOT
[facility] [address] [facility] [address] 
[phone] Patient: Chata Man MRN: [id number] Visit Information Date & Time Provider Department Dept. Phone Encounter #  
 2/21/2018 12:00 PM Viktor Lal MD [facility] [phone] [id number] Follow-up Instructions Return in about 4 weeks (around 3/21/2018). Upcoming Health Maintenance Date Due Hepatitis C Screening 1955 Pneumococcal 19-64 Medium Risk (1 of 1 - PPSV23) 12/25/1972 DTaP/Tdap/Td series (1 - Tdap) 12/25/1974 FOBT Q 1 YEAR AGE 50-75 12/25/2003 ZOSTER VACCINE AGE 60> 10/25/2013 PAP AKA CERVICAL CYTOLOGY 3/21/2015 Influenza Age 5 to Adult 8/1/2017 BREAST CANCER SCRN MAMMOGRAM 12/29/2019 Allergies as of 2/21/2018  Review Complete On: 2/21/2018 By: Viktor Lal MD  
 No Known Allergies Current Immunizations  Never Reviewed No immunizations on file. Not reviewed this visit You Were Diagnosed With   
  
 Codes Comments Paroxysmal atrial fibrillation (HCC)    -  Primary ICD-10-CM: I48.0 ICD-9-CM: 427.31 Benign hypertensive heart disease without heart failure     ICD-10-CM: I11.9 ICD-9-CM: 402.10 Symptomatic bradycardia     ICD-10-CM: R00.1 ICD-9-CM: 427.89 Cardiac pacemaker in situ     ICD-10-CM: Z95.0 ICD-9-CM: V45.01   
 PEÑA (dyspnea on exertion)     ICD-10-CM: R06.09 
ICD-9-CM: 786.09   
 JOSÉ MIGUEL (obstructive sleep apnea)     ICD-10-CM: G47.33 
ICD-9-CM: 327.23 Essential hypertension     ICD-10-CM: I10 
ICD-9-CM: 401.9 Cardiomyopathy, unspecified type ([facility]     ICD-10-CM: I42.9 ICD-9-CM: 425.4 Vitals BP Pulse Height(growth percentile) Weight(growth percentile) SpO2 BMI  
 112/66 68 5' 2\" (1.575 m) 167 lb (75.8 kg) 94% 30.54 kg/m2 OB Status Smoking Status Hysterectomy Never Smoker BMI and BSA Data Body Mass Index Body Surface Area 30.54 kg/m 2 1.82 m 2 Preferred Pharmacy Pharmacy Name Phone [facility] [address]  [facility] [phone] Your Updated Medication List  
  
   
This list is accurate as of 2/21/18 12:21 PM.  Always use your most recent med list.  
  
  
  
  
 apixaban 5 mg tablet Commonly known as:  Aster Heater Take 1 Tab by mouth two (2) times a day. atorvastatin 10 mg tablet Commonly known as:  LIPITOR  
  
 bumetanide 1 mg tablet Commonly known as:  Camryn Crafts Take 1 Tab by mouth daily. CALCIUM PO Take 1,200 mg by mouth daily. carvedilol 6.25 mg tablet Commonly known as:  Braddock Waylon Take 1 Tab by mouth two (2) times daily (with meals). FISH OIL 1,000 mg Cap Generic drug:  omega-3 fatty acids-vitamin e Take 1 Cap by mouth.  
  
 losartan 25 mg tablet Commonly known as:  COZAAR  
take 1 tablet by mouth once daily  
  
 multivitamin, stress formula tablet Commonly known as:  STRESS TAB Take 1 Tab by mouth daily. PRISTIQ 50 mg ER tablet Generic drug:  desvenlafaxine succinate Take 50 mg by mouth daily. Indications: GENERALIZED ANXIETY DISORDER  
  
 VITAMIN D3 1,000 unit tablet Generic drug:  cholecalciferol Take  by mouth daily. WELLBUTRIN  mg tablet Generic drug:  buPROPion XL Take 150 mg by mouth every morning. Follow-up Instructions Return in about 4 weeks (around 3/21/2018). Introducing [facility]! Dear Berenice Neither: Thank you for requesting a [employer] account. Our records indicate that you already have an active [employer] account. You can access your account anytime at [email or web address] Did you know that you can access your hospital and ER discharge instructions at any time in [employer]? You can also review all of your test results from your hospital stay or ER visit. Additional Information If you have questions, please visit the Frequently Asked Questions section of the [employer] website at [email or web address] Remember, [employer] is NOT to be used for urgent needs. For medical emergencies, dial 911. Now available from your iPhone and Android! Please provide this summary of care documentation to your next provider. Your primary care clinician is listed as CATHY Youngblood. If you have any questions after today's visit, please call [phone].

## 2018-02-21 NOTE — PROGRESS NOTES
1. Have you been to the ER, urgent care clinic since your last visit? Hospitalized since your last visit? No    2. Have you seen or consulted any other health care providers outside of the [address] [place] [address] since your last visit? Include any pap smears or colon screening.  No      A user error has taken place:

## 2018-02-21 NOTE — PROGRESS NOTES
1. Have you been to the ER, urgent care clinic since your last visit? Hospitalized since your last visit? No    2. Have you seen or consulted any other health care providers outside of the [address] [place] [address] since your last visit? Include any pap smears or colon screening.  No

## 2018-03-05 ENCOUNTER — HOSPITAL ENCOUNTER (OUTPATIENT)
Dept: NON INVASIVE DIAGNOSTICS | Age: 63
Discharge: HOME | End: 2018-03-05
Attending: INTERNAL MEDICINE
Payer: COMMERCIAL

## 2018-03-05 DIAGNOSIS — I48.0 PAROXYSMAL ATRIAL FIBRILLATION (HCC): ICD-10-CM

## 2018-03-05 PROCEDURE — 93306 TTE W/DOPPLER COMPLETE: CPT

## 2018-03-23 ENCOUNTER — TELEPHONE (OUTPATIENT)
Dept: CARDIOLOGY CLINIC | Age: 63
End: 2018-03-23

## 2018-03-23 ENCOUNTER — OFFICE VISIT (OUTPATIENT)
Dept: CARDIOLOGY CLINIC | Age: 63
End: 2018-03-23

## 2018-03-23 VITALS
DIASTOLIC BLOOD PRESSURE: 71 MMHG | HEART RATE: 72 BPM | HEIGHT: 62 IN | OXYGEN SATURATION: 97 % | BODY MASS INDEX: 30 KG/M2 | SYSTOLIC BLOOD PRESSURE: 119 MMHG | WEIGHT: 163 LBS

## 2018-03-23 DIAGNOSIS — G47.33 OSA (OBSTRUCTIVE SLEEP APNEA): ICD-10-CM

## 2018-03-23 DIAGNOSIS — R07.9 CHEST PAIN, UNSPECIFIED TYPE: ICD-10-CM

## 2018-03-23 DIAGNOSIS — I50.23 ACUTE ON CHRONIC SYSTOLIC CONGESTIVE HEART FAILURE (HCC): ICD-10-CM

## 2018-03-23 DIAGNOSIS — Z01.810 PRE-OPERATIVE CARDIOVASCULAR EXAMINATION: Primary | ICD-10-CM

## 2018-03-23 DIAGNOSIS — Z95.0 CARDIAC PACEMAKER IN SITU: ICD-10-CM

## 2018-03-23 DIAGNOSIS — E78.5 DYSLIPIDEMIA: ICD-10-CM

## 2018-03-23 DIAGNOSIS — I48.3 TYPICAL ATRIAL FLUTTER (HCC): Primary | ICD-10-CM

## 2018-03-23 DIAGNOSIS — I11.9 BENIGN HYPERTENSIVE HEART DISEASE WITHOUT HEART FAILURE: ICD-10-CM

## 2018-03-23 NOTE — TELEPHONE ENCOUNTER
Dr. Glroia Martinez patient is scheduled for a JINA/AFib Ablation on 4/24/18 @ 8:00am with Dr. Sarah Lara. Elsy/Stacy could you please put the CT Scan order in and call patient with instructions. Ashok Rob  Female, 57 y.o., 1955  Weight:   73.9 kg (163 lb)  Phone:   [phone]  PCP:   Nicho Lee MD  MRN:   [id number]  MyChart: Active  Next Appt (With Me)  None  Next Appt (Any Provider)  06/06/2018    Message  Received:  Today       Deandre Soares sent to [facility] Lara wants patient to be set up with ablation-afib              Office Visit for Follow-up  3/23/2018       Candis Keyes MD - Cardio Specialist at [facility] Encounter Summary       Diagnosis       Typical Atrial Flutter (hcc) (Primary)              Orders Signed This Encounter (1) View All Results      AMB POC EKG ROUTINE W/ 12 LEADS, INTER & REP View Result              Orders Pended This Encounter        None                Progress notes        No notes of this type exist for this encounter.

## 2018-03-23 NOTE — MR AVS SNAPSHOT
[facility] 
 
 
 [address] [place] [address] [address] 
[phone] Patient: Vonda Leigh MRN: [id number] Visit Information Date & Time Provider Department Dept. Phone Encounter #  
 3/23/2018  1:00 PM Alecia De León  [facility] Specialist at [facility] [phone] [id number] Follow-up Instructions Return in about 10 weeks (around 6/1/2018). Your Appointments 6/6/2018  9:30 AM  
Follow Up with Alecia De León MD  
[facility] Appt Note: 10 weeks [address] [place] [address] [address] [address]  
  
   
 [address] Upcoming Health Maintenance Date Due Hepatitis C Screening 1955 Pneumococcal 19-64 Medium Risk (1 of 1 - PPSV23) 12/25/1972 DTaP/Tdap/Td series (1 - Tdap) 12/25/1974 FOBT Q 1 YEAR AGE 50-75 12/25/2003 ZOSTER VACCINE AGE 60> 10/25/2013 PAP AKA CERVICAL CYTOLOGY 3/21/2015 Influenza Age 5 to Adult 8/1/2017 BREAST CANCER SCRN MAMMOGRAM 12/29/2019 Allergies as of 3/23/2018  Review Complete On: 3/23/2018 By: Nader Abrams RN No Known Allergies Current Immunizations  Never Reviewed No immunizations on file. Not reviewed this visit You Were Diagnosed With   
  
 Codes Comments Typical atrial flutter (HCC)    -  Primary ICD-10-CM: I48.3 ICD-9-CM: 427.32 Vitals BP Pulse Height(growth percentile) Weight(growth percentile) SpO2 BMI  
 119/71 72 5' 2\" (1.575 m) 163 lb (73.9 kg) 97% 29.81 kg/m2 OB Status Smoking Status Hysterectomy Never Smoker BMI and BSA Data Body Mass Index Body Surface Area  
 29.81 kg/m 2 1.8 m 2 Preferred Pharmacy Pharmacy Name Phone [facility] [address]  [facility] [phone] Your Updated Medication List  
  
   
 This list is accurate as of 3/23/18  1:45 PM.  Always use your most recent med list.  
  
  
  
  
 apixaban 5 mg tablet Commonly known as:  Lenord Car Take 1 Tab by mouth two (2) times a day. atorvastatin 10 mg tablet Commonly known as:  LIPITOR  
  
 bumetanide 1 mg tablet Commonly known as:  Tura Mark Take 1 Tab by mouth daily. CALCIUM PO Take 1,200 mg by mouth daily. carvedilol 6.25 mg tablet Commonly known as:  Janalyn Rosey Take 1 Tab by mouth two (2) times daily (with meals). FISH OIL 1,000 mg Cap Generic drug:  omega-3 fatty acids-vitamin e Take 1 Cap by mouth.  
  
 losartan 25 mg tablet Commonly known as:  COZAAR  
take 1 tablet by mouth once daily  
  
 multivitamin, stress formula tablet Commonly known as:  STRESS TAB Take 1 Tab by mouth daily. PRISTIQ 50 mg ER tablet Generic drug:  desvenlafaxine succinate Take 50 mg by mouth daily. Indications: GENERALIZED ANXIETY DISORDER  
  
 VITAMIN D3 1,000 unit tablet Generic drug:  cholecalciferol Take  by mouth daily. WELLBUTRIN  mg tablet Generic drug:  buPROPion XL Take 150 mg by mouth every morning. We Performed the Following AMB POC EKG ROUTINE W/ 12 LEADS, INTER & REP [25050 CPT(R)] Follow-up Instructions Return in about 10 weeks (around 6/1/2018). Introducing [address]! Dear Archibald Frankel: Thank you for requesting a [employer] account. Our records indicate that you already have an active [employer] account. You can access your account anytime at [email or web address]. [email or web address] Did you know that you can access your hospital and ER discharge instructions at any time in [employer]? You can also review all of your test results from your hospital stay or ER visit. Additional Information If you have questions, please visit the Frequently Asked Questions section of the [employer] website at [email or web address]/. Remember, [employer] is NOT to be used for urgent needs. For medical emergencies, dial 911. Now available from your iPhone and Android! Please provide this summary of care documentation to your next provider. Your primary care clinician is listed as CATHY Suh. If you have any questions after today's visit, please call [phone].

## 2018-04-04 NOTE — TELEPHONE ENCOUNTER
Patient called and given instructions over the phone.         Verbal order and read back per Elli Mahmood MD

## 2018-04-04 NOTE — PROGRESS NOTES
Subjective:      Sabrina Salguero is in the office today for cardiac reevaluation. She is a 66-year-old woman with hypertension, history of atrial flutter with symptomatic bradycardia, and prior atrial flutter ablation, as well as permanent pacemaker implantation in March of 2015. She was admitted to [facility] in 06/2017 with increasing shortness of breath and chest pressure. She was felt to have acute on chronic systolic failure. Her failure resolved fairly quickly with intravenous diuretics with a normalization of her chest x-ray, as well as her BNP. In January and early February of this year she began to experience more SOB and was found to be in atrial fibrillation. She was evaluated by Dr Jesi Gustafson and was sent for an update of her LV systolic function and chamber sizes. In the office today, she says she feels \"okay\". She senses a fluttering under her left breast.            Patient Active Problem List    Diagnosis Date Noted    JOSÉ MIGUEL (obstructive sleep apnea) 08/14/2017    Acute on chronic systolic congestive heart failure ([facility] [address] 07/20/2017    Bronchiolitis 07/20/2017    Chest pain 07/19/2017    Cardiac pacemaker in situ 02/25/2016    Hypertension     Dyslipidemia     Atrial flutter       Current Outpatient Prescriptions   Medication Sig Dispense Refill    apixaban (ELIQUIS) 5 mg tablet Take 1 Tab by mouth two (2) times a day. 60 Tab 6    bumetanide (BUMEX) 1 mg tablet Take 1 Tab by mouth daily. 30 Tab 9    carvedilol (COREG) 6.25 mg tablet Take 1 Tab by mouth two (2) times daily (with meals). 60 Tab 9    losartan (COZAAR) 25 mg tablet take 1 tablet by mouth once daily  0    atorvastatin (LIPITOR) 10 mg tablet   0    buPROPion XL (WELLBUTRIN XL) 150 mg tablet Take 150 mg by mouth every morning.  CALCIUM PO Take 1,200 mg by mouth daily.  multivitamin, stress formula (STRESS TAB) tablet Take 1 Tab by mouth daily.         cholecalciferol (VITAMIN D3) 1,000 unit tablet Take  by mouth daily.  omega-3 fatty acids-vitamin e (FISH OIL) 1,000 mg cap Take 1 Cap by mouth.  Desvenlafaxine SR (PRISTIQ) 50 mg tablet Take 50 mg by mouth daily. Indications: GENERALIZED ANXIETY DISORDER       No Known Allergies  Past Medical History:   Diagnosis Date    Atrial flutter     CHADS score 1  (-CHF, +HTN, -AGE, -DM, -CVA)    Atrial flutter ablation     3/13    Cardiomyopathy ([facility] [address]     LVEF 30% (03/17)    Dyslipidemia     Hypertension     Obstructive sleep apnea     CPAP    Pacemaker     3/13 (MEDTRONIC)     Past Surgical History:   Procedure Laterality Date    HX HYSTERECTOMY      ? years ago    HX OOPHORECTOMY       Family History   Problem Relation Age of Onset    Breast Cancer Sister 59     History   Smoking Status    Never Smoker   Smokeless Tobacco    Never Used          Review of Systems, additional:  Constitutional: negative  Eyes: negative  Respiratory: negative  Cardiovascular: negative  Gastrointestinal: negative  Musculoskeletal:negative  Neurological: negative  Behvioral/Psych: negative  Endocrine: negative  ENT: negative    Objective:     Visit Vitals    /71    Pulse 72    Ht 5' 2\" (1.575 m)    Wt 163 lb (73.9 kg)    SpO2 97%    BMI 29.81 kg/m2     General:  alert, cooperative, no distress   Chest Wall: inspection normal - no chest wall deformities or tenderness, respiratory effort normal   Lung: clear to auscultation bilaterally   Heart:  normal rate and regular rhythm, no murmurs noted, no gallops noted   Abdomen: soft, non-tender. Bowel sounds normal. No masses,  no organomegaly   Extremities: extremities normal, atraumatic, no cyanosis or edema Skin: no rashes   Neuro: alert, oriented, normal speech, no focal findings or movement disorder noted         Assessment/Plan:       ICD-10-CM ICD-9-CM    1. Acute on chronic systolic congestive heart failure ([facility]  Episode in 06/2017 with hospitalization at [facility]. I50.23 428.23      428.0    2.  Typical atrial flutter ([facility] S/P ablation 03/2013 with return of atrial dysrhythmia. CHADS score 1. Recent EP evaluation. Plan was to repeat Echo which demonstrated an EF of 35% and normal LA size. Patient now to be considered for repeat ablative procedure. I48.3 427.32    3. Hypertension, well controlled in office today. I11.9 402.10    4. Cardiac pacemaker in situ, Medtronic 03/2013 Z95.0 V45.01    5. Chest pain, unspecified type R07.9 786.50    6.  Dyslipidemia E78.5 272.4    7. JOSÉ MIGUEL (obstructive sleep apnea) G47.33 327.23

## 2018-04-04 NOTE — TELEPHONE ENCOUNTER
[facility]          Patient  EP Instructions                  1. You are scheduled to have a JINA/Afib Ablation on  4/24/18 , at 8:00AM.    Please check in at 7:00AM.    2. Please go to [facility] and anita in the outpatient parking lot that is located around to the back of the hospital and enter through the [facility] building. Once you enter through the [facility] check in with the  there. The  will either give you directions or assist you in getting to the cath holding area. 3.  [x]       You are not to eat or drink anything after midnight the morning of the               procedure. 4. Please continue to take your medications with a small sip of water on the morning of the procedure with the following exceptions:  Hold Eliquis 48 hours prior to procedure. Hold Bumex the morning of your procedure. 5. If you are diabetic, do not take your insulin/sugar pill the morning of the procedure. 6. We encourage families to wait in the waiting room on the first floor while the procedure is being done. The Doctor will come out and talk with you as soon as the procedure is over. 7. There is the possibility that you may spend the night in the hospital, depending on the results of the procedure. This will be determined after the procedure is done. 8.   If you or your family have any questions, please call our office Monday-Friday 9:00am         -4:30 pm , at [phone], and ask to speak to one of the nurses.

## 2018-04-10 ENCOUNTER — DOCUMENTATION ONLY (OUTPATIENT)
Dept: CARDIOLOGY CLINIC | Age: 63
End: 2018-04-10

## 2018-04-10 NOTE — PROGRESS NOTES
Spoke to Janette Doran for JINA (CPT [id number])- [id number] valid 4/10-/5/9/2018    Auth for afib ablation (CPT [id number]) # [id number]- [facility]

## 2018-04-18 ENCOUNTER — HOSPITAL ENCOUNTER (OUTPATIENT)
Dept: CT IMAGING | Age: 63
Discharge: HOME | End: 2018-04-18
Attending: INTERNAL MEDICINE
Payer: COMMERCIAL

## 2018-04-18 DIAGNOSIS — Z01.810 PRE-OPERATIVE CARDIOVASCULAR EXAMINATION: ICD-10-CM

## 2018-04-18 LAB — CREAT UR-MCNC: 0.3 MG/DL (ref 0.6–1.3)

## 2018-04-18 PROCEDURE — 82565 ASSAY OF CREATININE: CPT

## 2018-04-18 PROCEDURE — 74011636320 HC RX REV CODE- 636/320: Performed by: INTERNAL MEDICINE

## 2018-04-18 PROCEDURE — 75572 CT HRT W/3D IMAGE: CPT

## 2018-04-18 RX ADMIN — IOPAMIDOL 125 ML: 755 INJECTION, SOLUTION INTRAVENOUS at 10:00

## 2018-04-23 ENCOUNTER — ANESTHESIA EVENT (OUTPATIENT)
Dept: NON INVASIVE DIAGNOSTICS | Age: 63
End: 2018-04-23
Payer: COMMERCIAL

## 2018-04-24 ENCOUNTER — HOSPITAL ENCOUNTER (OUTPATIENT)
Dept: NON INVASIVE DIAGNOSTICS | Age: 63
Discharge: HOME | End: 2018-04-24
Attending: INTERNAL MEDICINE | Admitting: INTERNAL MEDICINE
Payer: COMMERCIAL

## 2018-04-24 ENCOUNTER — ANESTHESIA (OUTPATIENT)
Dept: NON INVASIVE DIAGNOSTICS | Age: 63
End: 2018-04-24
Payer: COMMERCIAL

## 2018-04-24 VITALS
DIASTOLIC BLOOD PRESSURE: 73 MMHG | HEIGHT: 62 IN | SYSTOLIC BLOOD PRESSURE: 149 MMHG | OXYGEN SATURATION: 92 % | BODY MASS INDEX: 29.44 KG/M2 | TEMPERATURE: 96.3 F | RESPIRATION RATE: 23 BRPM | WEIGHT: 160 LBS | HEART RATE: 60 BPM

## 2018-04-24 LAB
ANION GAP SERPL CALC-SCNC: 6 MMOL/L (ref 3–18)
BUN SERPL-MCNC: 22 MG/DL (ref 7–18)
BUN/CREAT SERPL: 27 (ref 12–20)
CALCIUM SERPL-MCNC: 9.6 MG/DL (ref 8.5–10.1)
CHLORIDE SERPL-SCNC: 101 MMOL/L (ref 100–108)
CO2 SERPL-SCNC: 33 MMOL/L (ref 21–32)
CREAT SERPL-MCNC: 0.83 MG/DL (ref 0.6–1.3)
ERYTHROCYTE [DISTWIDTH] IN BLOOD BY AUTOMATED COUNT: 13.3 % (ref 11.6–14.5)
GLUCOSE SERPL-MCNC: 88 MG/DL (ref 74–99)
HCT VFR BLD AUTO: 38.8 % (ref 35–45)
HGB BLD-MCNC: 12.8 G/DL (ref 12–16)
INR PPP: 1 (ref 0.8–1.2)
MCH RBC QN AUTO: 30.9 PG (ref 24–34)
MCHC RBC AUTO-ENTMCNC: 33 G/DL (ref 31–37)
MCV RBC AUTO: 93.7 FL (ref 74–97)
PLATELET # BLD AUTO: 198 K/UL (ref 135–420)
PMV BLD AUTO: 11.5 FL (ref 9.2–11.8)
POTASSIUM SERPL-SCNC: 3.6 MMOL/L (ref 3.5–5.5)
PROTHROMBIN TIME: 13 SEC (ref 11.5–15.2)
RBC # BLD AUTO: 4.14 M/UL (ref 4.2–5.3)
SODIUM SERPL-SCNC: 140 MMOL/L (ref 136–145)
WBC # BLD AUTO: 5.3 K/UL (ref 4.6–13.2)

## 2018-04-24 PROCEDURE — 74011250636 HC RX REV CODE- 250/636

## 2018-04-24 PROCEDURE — 74011000250 HC RX REV CODE- 250

## 2018-04-24 PROCEDURE — 93312 ECHO TRANSESOPHAGEAL: CPT

## 2018-04-24 PROCEDURE — 80048 BASIC METABOLIC PNL TOTAL CA: CPT | Performed by: INTERNAL MEDICINE

## 2018-04-24 PROCEDURE — 93656 COMPRE EP EVAL ABLTJ ATR FIB: CPT

## 2018-04-24 PROCEDURE — 85027 COMPLETE CBC AUTOMATED: CPT | Performed by: INTERNAL MEDICINE

## 2018-04-24 PROCEDURE — 85610 PROTHROMBIN TIME: CPT | Performed by: INTERNAL MEDICINE

## 2018-04-24 PROCEDURE — 76060000032 HC ANESTHESIA 0.5 TO 1 HR

## 2018-04-24 RX ORDER — LIDOCAINE HYDROCHLORIDE 10 MG/ML
30 INJECTION, SOLUTION EPIDURAL; INFILTRATION; INTRACAUDAL; PERINEURAL
Status: DISCONTINUED | OUTPATIENT
Start: 2018-04-24 | End: 2018-04-24 | Stop reason: HOSPADM

## 2018-04-24 RX ORDER — SODIUM CHLORIDE, SODIUM LACTATE, POTASSIUM CHLORIDE, CALCIUM CHLORIDE 600; 310; 30; 20 MG/100ML; MG/100ML; MG/100ML; MG/100ML
INJECTION, SOLUTION INTRAVENOUS
Status: DISCONTINUED | OUTPATIENT
Start: 2018-04-24 | End: 2018-04-24 | Stop reason: HOSPADM

## 2018-04-24 RX ORDER — ONDANSETRON 2 MG/ML
INJECTION INTRAMUSCULAR; INTRAVENOUS AS NEEDED
Status: DISCONTINUED | OUTPATIENT
Start: 2018-04-24 | End: 2018-04-24 | Stop reason: HOSPADM

## 2018-04-24 RX ORDER — HEPARIN SODIUM 1000 [USP'U]/ML
5000 INJECTION, SOLUTION INTRAVENOUS; SUBCUTANEOUS ONCE
Status: DISCONTINUED | OUTPATIENT
Start: 2018-04-24 | End: 2018-04-24 | Stop reason: HOSPADM

## 2018-04-24 RX ORDER — FENTANYL CITRATE 50 UG/ML
INJECTION, SOLUTION INTRAMUSCULAR; INTRAVENOUS AS NEEDED
Status: DISCONTINUED | OUTPATIENT
Start: 2018-04-24 | End: 2018-04-24 | Stop reason: HOSPADM

## 2018-04-24 RX ORDER — ETOMIDATE 2 MG/ML
INJECTION INTRAVENOUS AS NEEDED
Status: DISCONTINUED | OUTPATIENT
Start: 2018-04-24 | End: 2018-04-24 | Stop reason: HOSPADM

## 2018-04-24 RX ORDER — DEXAMETHASONE SODIUM PHOSPHATE 4 MG/ML
INJECTION, SOLUTION INTRA-ARTICULAR; INTRALESIONAL; INTRAMUSCULAR; INTRAVENOUS; SOFT TISSUE AS NEEDED
Status: DISCONTINUED | OUTPATIENT
Start: 2018-04-24 | End: 2018-04-24 | Stop reason: HOSPADM

## 2018-04-24 RX ORDER — SUCCINYLCHOLINE CHLORIDE 20 MG/ML
INJECTION INTRAMUSCULAR; INTRAVENOUS AS NEEDED
Status: DISCONTINUED | OUTPATIENT
Start: 2018-04-24 | End: 2018-04-24 | Stop reason: HOSPADM

## 2018-04-24 RX ORDER — MIDAZOLAM HYDROCHLORIDE 1 MG/ML
INJECTION, SOLUTION INTRAMUSCULAR; INTRAVENOUS AS NEEDED
Status: DISCONTINUED | OUTPATIENT
Start: 2018-04-24 | End: 2018-04-24 | Stop reason: HOSPADM

## 2018-04-24 RX ORDER — SODIUM CHLORIDE 9 MG/ML
50 INJECTION, SOLUTION INTRAVENOUS CONTINUOUS
Status: DISCONTINUED | OUTPATIENT
Start: 2018-04-24 | End: 2018-04-24 | Stop reason: HOSPADM

## 2018-04-24 RX ADMIN — SUCCINYLCHOLINE CHLORIDE 140 MG: 20 INJECTION INTRAMUSCULAR; INTRAVENOUS at 08:17

## 2018-04-24 RX ADMIN — MIDAZOLAM HYDROCHLORIDE 2 MG: 1 INJECTION, SOLUTION INTRAMUSCULAR; INTRAVENOUS at 08:14

## 2018-04-24 RX ADMIN — ONDANSETRON 4 MG: 2 INJECTION INTRAMUSCULAR; INTRAVENOUS at 08:44

## 2018-04-24 RX ADMIN — FENTANYL CITRATE 100 MCG: 50 INJECTION, SOLUTION INTRAMUSCULAR; INTRAVENOUS at 08:14

## 2018-04-24 RX ADMIN — SODIUM CHLORIDE, SODIUM LACTATE, POTASSIUM CHLORIDE, CALCIUM CHLORIDE: 600; 310; 30; 20 INJECTION, SOLUTION INTRAVENOUS at 08:11

## 2018-04-24 RX ADMIN — DEXAMETHASONE SODIUM PHOSPHATE 4 MG: 4 INJECTION, SOLUTION INTRA-ARTICULAR; INTRALESIONAL; INTRAMUSCULAR; INTRAVENOUS; SOFT TISSUE at 08:44

## 2018-04-24 RX ADMIN — ETOMIDATE 20 MG: 2 INJECTION INTRAVENOUS at 08:17

## 2018-04-24 NOTE — ANESTHESIA PREPROCEDURE EVALUATION
Anesthetic History   No history of anesthetic complications            Review of Systems / Medical History  Patient summary reviewed    Pulmonary        Sleep apnea: CPAP        Comments: On inhaled steroids per pulmonologist   Neuro/Psych   Within defined limits           Cardiovascular          CHF: dyspnea on exertion  Dysrhythmias : atrial fibrillation and atrial flutter  Pacemaker      Comments: EF 35%   GI/Hepatic/Renal  Within defined limits              Endo/Other  Within defined limits           Other Findings   Comments: Documentation of current medication  Current medications obtained, documented and obtained? YES      Risk Factors for Postoperative nausea/vomiting:       History of postoperative nausea/vomiting? NO       Female? YES       Motion sickness? NO       Intended opioid administration for postoperative analgesia? NO      Smoking Abstinence:  Current Smoker? NO  Elective Surgery? YES  Seen preoperatively by anesthesiologist or proxy prior to day of surgery? YES  Pt abstained from smoking 24 hours prior to anesthesia? N/A    Preventive care/screening for High Blood Pressure:  Aged 18 years and older: YES  Screened for high blood pressure: YES  Patients with high blood pressure referred to primary care provider   for BP management: YES                 Physical Exam    Airway  Mallampati: III  TM Distance: 4 - 6 cm  Neck ROM: decreased range of motion, short neck   Mouth opening: Normal     Cardiovascular  Regular rate and rhythm,  S1 and S2 normal,  no murmur, click, rub, or gallop             Dental    Dentition: Lower partial plate     Pulmonary  Breath sounds clear to auscultation               Abdominal  GI exam deferred       Other Findings            Anesthetic Plan    ASA: 4  Anesthesia type: general    Monitoring Plan: Arterial line      Induction: Intravenous  Anesthetic plan and risks discussed with: Patient      Bilateral hearing aids removed.

## 2018-04-24 NOTE — IP AVS SNAPSHOT
[address] [address] [address] Patient: Jesus Alberto Silva MRN: [id number] EUT:[id number] About your hospitalization You were admitted on:  April 24, 2018 You last received care in the:  [facility] You were discharged on:  April 24, 2018 Why you were hospitalized Your primary diagnosis was:  Not on File Follow-up Information Follow up With Details Comments Contact Info Ralph Wilhelm MD   [address] [facility] [address] [id number] 
[phone] Your Scheduled Appointments Wednesday June 06, 2018  9:30 AM EDT Follow Up with Sai Richard MD  
Cardio Specialist at [facility] [phone]  
[phone] Discharge Orders None A check mare indicates which time of day the medication should be taken. My Medications CONTINUE taking these medications Instructions Each Dose to Equal  
 Morning Noon Evening Bedtime  
 apixaban 5 mg tablet Commonly known as:  Morna Brasher Your last dose was: Your next dose is: Take 1 Tab by mouth two (2) times a day. 5 mg  
    
   
   
   
  
 atorvastatin 10 mg tablet Commonly known as:  LIPITOR Your last dose was: Your next dose is:    
   
   
      
   
   
   
  
 bumetanide 1 mg tablet Commonly known as:  Shirlecorazon Caal Your last dose was: Your next dose is: Take 1 Tab by mouth daily. 1 mg CALCIUM PO Your last dose was: Your next dose is: Take 1,200 mg by mouth daily. 1200 mg  
    
   
   
   
  
 carvedilol 6.25 mg tablet Commonly known as:  Alvin Mckeon Your last dose was: Your next dose is: Take 1 Tab by mouth two (2) times daily (with meals). 6.25 mg FISH OIL 1,000 mg Cap Generic drug:  omega-3 fatty acids-vitamin e Your last dose was: Your next dose is: Take 1 Cap by mouth. 1 Cap  
    
   
   
   
  
 losartan 25 mg tablet Commonly known as:  COZAAR Your last dose was: Your next dose is:    
   
   
 take 1 tablet by mouth once daily  
     
   
   
   
  
 multivitamin, stress formula tablet Commonly known as:  STRESS TAB Your last dose was: Your next dose is: Take 1 Tab by mouth daily. 1 Tab PRISTIQ 50 mg ER tablet Generic drug:  desvenlafaxine succinate Your last dose was: Your next dose is: Take 50 mg by mouth daily. Indications: GENERALIZED ANXIETY DISORDER  
 50 mg  
    
   
   
   
  
 VITAMIN D3 1,000 unit tablet Generic drug:  cholecalciferol Your last dose was: Your next dose is: Take  by mouth daily. WELLBUTRIN  mg tablet Generic drug:  buPROPion XL Your last dose was: Your next dose is: Take 150 mg by mouth every morning. 150 mg Discharge Instructions DISCHARGE SUMMARY from Nurse PATIENT INSTRUCTIONS: 
 
 
F-face looks uneven A-arms unable to move or move unevenly S-speech slurred or non-existent T-time-call 911 as soon as signs and symptoms begin-DO NOT go Back to bed or wait to see if you get better-TIME IS BRAIN. Warning Signs of HEART ATTACK Call 911 if you have these symptoms: 
? Chest discomfort. Most heart attacks involve discomfort in the center of the chest that lasts more than a few minutes, or that goes away and comes back. It can feel like uncomfortable pressure, squeezing, fullness, or pain. ? Discomfort in other areas of the upper body. Symptoms can include pain or discomfort in one or both arms, the back, neck, jaw, or stomach. ? Shortness of breath with or without chest discomfort. ? Other signs may include breaking out in a cold sweat, nausea, or lightheadedness. Don't wait more than five minutes to call [address]! Fast action can save your life. Calling 911 is almost always the fastest way to get lifesaving treatment. Emergency Medical Services staff can begin treatment when they arrive  up to an hour sooner than if someone gets to the hospital by car. The discharge information has been reviewed with the patient. The patient verbalized understanding. Discharge medications reviewed with the patient and appropriate educational materials and side effects teaching were provided. Patient armband removed and shredded MyChart Activation Thank you for requesting access to ProofPilot. Please follow the instructions below to securely access and download your online medical record. ProofPilot allows you to send messages to your doctor, view your test results, renew your prescriptions, schedule appointments, and more. How Do I Sign Up? 1. In your internet browser, go to [email or web address]. 2. Click on the First Time User? Click Here link in the Sign In box. You will see the New Member Sign Up page. 3. Enter your ProofPilot Access Code exactly as it appears below. You will not need to use this code after youve completed the sign-up process. If you do not sign up before the expiration date, you must request a new code. ProofPilot Access Code: Activation code not generated Current ProofPilot Status: Active (This is the date your ProofPilot access code will ) 4. Enter the last four digits of your Social Security Number (xxxx) and Date of Birth (mm/dd/yyyy) as indicated and click Submit. You will be taken to the next sign-up page. 5. Create a [employer] IIt ID. This will be your GeriJoy login ID and cannot be changed, so think of one that is secure and easy to remember. 6. Create a GeriJoy password. You can change your password at any time. 7. Enter your Password Reset Question and Answer. This can be used at a later time if you forget your password. 8. Enter your e-mail address. You will receive e-mail notification when new information is available in [address] 9. Click Sign Up. You can now view and download portions of your medical record. 10. Click the Download Summary menu link to download a portable copy of your medical information. Additional Information If you have questions, please visit the Frequently Asked Questions section of the GeriJoy website at [email or web address] Remember, GeriJoy is NOT to be used for urgent needs. For medical emergencies, dial 911. Transesophageal Echocardiogram: What to Expect at [facility] Your Recovery A transesophageal echocardiogram is a test to help your doctor look at the inside of your heart. A small device called a transducer directs sound waves toward your heart. The sound waves make a picture of the heart's valves and chambers. Before the test, your throat was sprayed with medicine to numb it. You won't be able to eat or drink until the numbness wears off. Your throat may be sore for a few days. You may have had a sedative to help you relax. You may be unsteady after having sedation. It can take a few hours for the medicine's effects to wear off. Common side effects include nausea, vomiting, and feeling sleepy or tired. This care sheet gives you a general idea about how long it will take for you to recover. But each person recovers at a different pace. Follow the steps below to feel better as quickly as possible. How can you care for yourself at home? Activity ?  · If a sedative was used, your doctor will tell you when it is safe for you to do your normal activities. ? · For your safety, do not drive or operate any machinery that could be dangerous. Wait until the medicine wears off and you can think clearly and react easily. Diet ? · You can eat your normal diet. Follow-up care is a key part of your treatment and safety. Be sure to make and go to all appointments, and call your doctor if you are having problems. It's also a good idea to know your test results and keep a list of the medicines you take. When should you call for help? Watch closely for changes in your health, and be sure to contact your doctor if you have any problems. Where can you learn more? Go to http://itzel-palak.info/. Enter V944 in the search box to learn more about \"Transesophageal Echocardiogram: What to Expect at Home. \" Current as of: September 21, 2016 Content Version: 11.4 © 6190-2230 Aunt Bertha. Care instructions adapted under license by [employer] (which disclaims liability or warranty for this information). If you have questions about a medical condition or this instruction, always ask your healthcare professional. [address] any warranty or liability for your use of this information. ___________________________________________________________________________________________________________________________________ 
 
NO driving x 24 hours. Introducing [facility]! Dear Eloy Auguste: Thank you for requesting a CricHQ account. Our records indicate that you already have an active CricHQ account. You can access your account anytime at [email or web address] Did you know that you can access your hospital and ER discharge instructions at any time in CricHQ? You can also review all of your test results from your hospital stay or ER visit. Additional Information If you have questions, please visit the Frequently Asked Questions section of the [facility] website at [email or web address] Remember, [facility] is NOT to be used for urgent needs. For medical emergencies, dial 911. Now available from your iPhone and Android! Introducing Bandar Nichole As a Emma Diaz patient, I wanted to make you aware of our electronic visit tool called Bandar Nichole. Sensopia/Beyond Games allows you to connect within minutes with a medical provider 24 hours a day, seven days a week via a mobile device or tablet or logging into a secure website from your computer. You can access Bandar Nichole from anywhere in the [place]. A virtual visit might be right for you when you have a simple condition and feel like you just dont want to get out of bed, or cant get away from work for an appointment, when your regular [facility] provider is not available (evenings, weekends or holidays), or when youre out of town and need minor care. Electronic visits cost only $49 and if the Sensopia/Beyond Games provider determines a prescription is needed to treat your condition, one can be electronically transmitted to a nearby pharmacy*. Please take a moment to enroll today if you have not already done so. The enrollment process is free and takes just a few minutes. To enroll, please download the Sensopia/Beyond Games gala to your tablet or phone, or visit [email or web address] to enroll on your computer. And, as an [address] [place] [address] patient with a ImmuneXcite account, the results of your visits will be scanned into your electronic medical record and your primary care provider will be able to view the scanned results. We urge you to continue to see your regular [facility] provider for your ongoing medical care. And while your primary care provider may not be the one available when you seek a Bandar Nichole virtual visit, the peace of mind you get from getting a real diagnosis real time can be priceless. For more information on Bandar Nichole, view our Frequently Asked Questions (FAQs) at [email or web address]. Sincerely, 
 
Joanne Ruffin MD 
Chief Medical Officer [employer] *:  certain medications cannot be prescribed via Bandar Nichole Providers Seen During Your Hospitalization Provider Specialty Primary office phone Tobi Bennett MD Cardiology [phone] Your Primary Care Physician (PCP) Primary Care Physician Office Phone Office Fax Darin Gilford [phone] You are allergic to the following No active allergies Recent Documentation Height Weight Breastfeeding? BMI OB Status Smoking Status 1.575 m 72.6 kg No 29.26 kg/m2 Hysterectomy Never Smoker Emergency Contacts Name Discharge Info Relation Home Work Mobile [address] CAREGIVER [3] Spouse [3] [address]  Spouse [3] [phone] Patient Belongings The following personal items are in your possession at time of discharge: 
     Visual Aid: None Please provide this summary of care documentation to your next provider. Signatures-by signing, you are acknowledging that this After Visit Summary has been reviewed with you and you have received a copy. Patient Signature:  ____________________________________________________________ Date:  ____________________________________________________________  
  
Sinan Sibley Provider Signature:  ____________________________________________________________ Date:  ____________________________________________________________

## 2018-04-24 NOTE — H&P
Seen & examined. See full H&P/notes for details. Plan JINA & atrial fibrillation ablation. All risks, benefits, alternatives discussed. All questions answered. Risks included but are not limited to pain, infection, bleeding, stroke, perforation, valve damage, nerve damage, diaphragmatic paralysis, rupture of esophagus and possible fistula from heart to esophagus, deep venous thrombosis, emergent open heart surgery, and death both during procedure and post-operatively. History of Present Illness: A 57 y.o. female referred by Dr. Jovanni Chirinos for evaluation for atrial fibrillation. She has a history of atrial flutter dating back to 2013 as well as a pacemaker in 2015. She has been doing relatively well. She had a heart failure exacerbation July 2017 and ejection fraction was noted to be in the 30's. She has been taking diuretics, controlling her blood pressure and monitoring salt and has lost almost 40 pounds. She has palpitations from time to time and she seems to be more shortness of breath. She is followed by Dr. Tracy Mack from pulmonary for her underlying bronchial disease. She denies syncope, chest pain, PND, orthopnea. She is on Bumex, AV blocker and ARB. She is here to discuss options.      Impression/Plan:   Paroxsymal symptomatic atrial fibrillation with symptoms of dyspnea and congestive heart failure. Recent cardiomyopathy diagnosed July 2017 with EF in the 35% by echo 3/5/18. Normal left atrial size by echocardiogram 3/5/18. Chronic systolic heart failure, improved on Bumex. Chronic ARB and beta-blocker therapy. Medtronic pacemaker 2013 with normal function placed for symptomatic bradycardia. Obstructive sleep apnea on CPAP. Hypertension, improved. Dyslipidemia. Chronic Eliquis use. Chronic losartan and coreg use > 3 months.     I reviewed echocardiogram and EF unchanged at 35%, given normal left atrial size, will proceed with attempt at A.fib ablation with PVI.       If her EF remains less than 35%, I would consider potential AICD upgrade of her pacemaker in 3 months.          Past Medical History:   Diagnosis Date    Atrial flutter       CHADS score 1  (-CHF, +HTN, -AGE, -DM, -CVA)    Atrial flutter ablation       3/13    Cardiomyopathy (HCC)       LVEF 30% (03/17)    Dyslipidemia      Hypertension      Obstructive sleep apnea       CPAP    Pacemaker       3/13 (MEDTRONIC)                Current Outpatient Prescriptions   Medication Sig Dispense Refill    apixaban (ELIQUIS) 5 mg tablet Take 1 Tab by mouth two (2) times a day. 60 Tab 6    bumetanide (BUMEX) 1 mg tablet Take 1 Tab by mouth daily. 30 Tab 9    carvedilol (COREG) 6.25 mg tablet Take 1 Tab by mouth two (2) times daily (with meals). 60 Tab 9    losartan (COZAAR) 25 mg tablet take 1 tablet by mouth once daily   0    atorvastatin (LIPITOR) 10 mg tablet     0    buPROPion XL (WELLBUTRIN XL) 150 mg tablet Take 150 mg by mouth every morning.          CALCIUM PO Take 1,200 mg by mouth daily.          multivitamin, stress formula (STRESS TAB) tablet Take 1 Tab by mouth daily.          cholecalciferol (VITAMIN D3) 1,000 unit tablet Take  by mouth daily.          omega-3 fatty acids-vitamin e (FISH OIL) 1,000 mg cap Take 1 Cap by mouth.          Desvenlafaxine SR (PRISTIQ) 50 mg tablet Take 50 mg by mouth daily. Indications: GENERALIZED ANXIETY DISORDER             Social History   reports that she has never smoked. She has never used smokeless tobacco.   reports that she drinks alcohol.     Family History  family history includes Breast Cancer (age of onset: 59) in her sister.     Review of Systems  Except as stated above include:  Constitutional: Negative for fever, chills and malaise/fatigue. HEENT: No congestion or recent URI. Gastrointestinal: No nausea, vomiting, abdominal pain, bloody stools. Pulmonary:  Negative except as stated above. Cardiac:  Negative except as stated above.   Musculoskeletal: Negative except as stated above. Neurological:  No localized symptoms. Skin:  Negative except as stated above. Psych:  No mood swings. Endocrine:  No heat/cold intolerance.     PHYSICAL EXAM      BP Readings from Last 3 Encounters:   02/21/18 112/66   02/21/18 112/66   12/20/17 115/66          Pulse Readings from Last 3 Encounters:   02/21/18 68   02/21/18 68   12/20/17 63          Wt Readings from Last 3 Encounters:   02/21/18 75.8 kg (167 lb)   02/21/18 75.8 kg (167 lb)   12/20/17 79.8 kg (176 lb)      General:                    Well developed, well groomed. Head/Neck:               No jugular venous distention                                               No carotid bruits. No evidence of xanthelasma. Lungs:                       No respiratory distress. Clear bilaterally. Heart:                                              Regular rate and rhythm. Normal S1/S2. Palpation of heart with normal point of maximum impulse. No significant murmurs, rubs or gallops. Pacer pocket intact. Abdomen:                  Soft and nontender. No palpable abdominal mass or bruits. Extremities:               Intact peripheral pulses. No significant edema. Neurological:             Alert and oriented to person, place, time.                                                 No focal neurological deficit visually.     Blood Pressure Metric:  controlled         Electronically signed by Dave Amanda MD at 02/21/18 0710

## 2018-04-24 NOTE — PROCEDURES
Patient brought to EP lab, intubated by anesthesia. JINA performed. Ef 30%, moderate biatrial enlargement with significant spontaneous contrast in left atrium and left atrial appendage. Reviewed with Dr. Isabell Velasco and suspected RITIKA thrombus. Will therefore cancel procedure. Patient extubated and left lab. This occurred despite taking Eliquis up until 1 day ago. Given EF of 30% and high risk for thrombus with ablation, will plan upgrade to biventricular AICD to be scheduled as outpatient.

## 2018-04-24 NOTE — DISCHARGE INSTRUCTIONS
DISCHARGE SUMMARY from Nurse    PATIENT INSTRUCTIONS:    After general anesthesia or intravenous sedation, for 24 hours or while taking prescription Narcotics:  · Limit your activities  · Do not drive and operate hazardous machinery  · Do not make important personal or business decisions  · Do  not drink alcoholic beverages  · If you have not urinated within 8 hours after discharge, please contact your surgeon on call. Report the following to your surgeon:  · Excessive pain, swelling, redness or odor of or around the surgical area  · Temperature over 100.5  · Nausea and vomiting lasting longer than 4 hours or if unable to take medications  · Any signs of decreased circulation or nerve impairment to extremity: change in color, persistent  numbness, tingling, coldness or increase pain  · Any questions    What to do at Home:  Recommended activity: No lifting, Driving, or Strenuous exercise for 24 hours. I*  Please give a list of your current medications to your Primary Care Provider. *  Please update this list whenever your medications are discontinued, doses are      changed, or new medications (including over-the-counter products) are added. *  Please carry medication information at all times in case of emergency situations. These are general instructions for a healthy lifestyle:    No smoking/ No tobacco products/ Avoid exposure to second hand smoke  Surgeon General's Warning:  Quitting smoking now greatly reduces serious risk to your health.     Obesity, smoking, and sedentary lifestyle greatly increases your risk for illness    A healthy diet, regular physical exercise & weight monitoring are important for maintaining a healthy lifestyle    You may be retaining fluid if you have a history of heart failure or if you experience any of the following symptoms:  Weight gain of 3 pounds or more overnight or 5 pounds in a week, increased swelling in our hands or feet or shortness of breath while lying flat in bed. Please call your doctor as soon as you notice any of these symptoms; do not wait until your next office visit. Recognize signs and symptoms of STROKE:    F-face looks uneven    A-arms unable to move or move unevenly    S-speech slurred or non-existent    T-time-call 911 as soon as signs and symptoms begin-DO NOT go       Back to bed or wait to see if you get better-TIME IS BRAIN. Warning Signs of HEART ATTACK     Call 911 if you have these symptoms:   Chest discomfort. Most heart attacks involve discomfort in the center of the chest that lasts more than a few minutes, or that goes away and comes back. It can feel like uncomfortable pressure, squeezing, fullness, or pain.  Discomfort in other areas of the upper body. Symptoms can include pain or discomfort in one or both arms, the back, neck, jaw, or stomach.  Shortness of breath with or without chest discomfort.  Other signs may include breaking out in a cold sweat, nausea, or lightheadedness. Don't wait more than five minutes to call 911 - MINUTES MATTER! Fast action can save your life. Calling 911 is almost always the fastest way to get lifesaving treatment. Emergency Medical Services staff can begin treatment when they arrive -- up to an hour sooner than if someone gets to the hospital by car. The discharge information has been reviewed with the patient. The patient verbalized understanding. Discharge medications reviewed with the patient and appropriate educational materials and side effects teaching were provided. Patient armband removed and shredded  MyChart Activation    Thank you for requesting access to [facility]. Please follow the instructions below to securely access and download your online medical record. [facility] allows you to send messages to your doctor, view your test results, renew your prescriptions, schedule appointments, and more. How Do I Sign Up? 1.  In your internet browser, go to [email or web address]. 2. Click on the First Time User? Click Here link in the Sign In box. You will see the New Member Sign Up page. 3. Enter your [employer] Access Code exactly as it appears below. You will not need to use this code after youve completed the sign-up process. If you do not sign up before the expiration date, you must request a new code. [employer] Access Code: Activation code not generated  Current [employer] Status: Active (This is the date your [employer] access code will )    4. Enter the last four digits of your Social Security Number (xxxx) and Date of Birth (mm/dd/yyyy) as indicated and click Submit. You will be taken to the next sign-up page. 5. Create a [employer] ID. This will be your [employer] login ID and cannot be changed, so think of one that is secure and easy to remember. 6. Create a [employer] password. You can change your password at any time. 7. Enter your Password Reset Question and Answer. This can be used at a later time if you forget your password. 8. Enter your e-mail address. You will receive e-mail notification when new information is available in [address] 9. Click Sign Up. You can now view and download portions of your medical record. 10. Click the Download Summary menu link to download a portable copy of your medical information. Additional Information    If you have questions, please visit the Frequently Asked Questions section of the [employer] website at [email or web address] Remember, [employer] is NOT to be used for urgent needs. For medical emergencies, dial 911. Transesophageal Echocardiogram: What to Expect at [address]  A transesophageal echocardiogram is a test to help your doctor look at the inside of your heart. A small device called a transducer directs sound waves toward your heart. The sound waves make a picture of the heart's valves and chambers.   Before the test, your throat was sprayed with medicine to numb it. You won't be able to eat or drink until the numbness wears off. Your throat may be sore for a few days. You may have had a sedative to help you relax. You may be unsteady after having sedation. It can take a few hours for the medicine's effects to wear off. Common side effects include nausea, vomiting, and feeling sleepy or tired. This care sheet gives you a general idea about how long it will take for you to recover. But each person recovers at a different pace. Follow the steps below to feel better as quickly as possible. How can you care for yourself at home? Activity  ? · If a sedative was used, your doctor will tell you when it is safe for you to do your normal activities. ? · For your safety, do not drive or operate any machinery that could be dangerous. Wait until the medicine wears off and you can think clearly and react easily. Diet  ? · You can eat your normal diet. Follow-up care is a key part of your treatment and safety. Be sure to make and go to all appointments, and call your doctor if you are having problems. It's also a good idea to know your test results and keep a list of the medicines you take. When should you call for help? Watch closely for changes in your health, and be sure to contact your doctor if you have any problems. Where can you learn more? Go to http://itzel-palak.info/. Enter F467 in the search box to learn more about \"Transesophageal Echocardiogram: What to Expect at Home. \"  Current as of: September 21, 2016  Content Version: 11.4  © 5312-3327 Healthwise, [employer]. Care instructions adapted under license by [employer] (which disclaims liability or warranty for this information).  If you have questions about a medical condition or this instruction, always ask your healthcare professional. [address] any warranty or liability for your use of this information. ___________________________________________________________________________________________________________________________________    NO driving x 24 hours.

## 2018-04-24 NOTE — ANESTHESIA POSTPROCEDURE EVALUATION
Post-Anesthesia Evaluation & Assessment    Visit Vitals    /73    Pulse 60    Temp 35.7 °C (96.3 °F)    Resp 23    Ht 5' 2\" (1.575 m)    Wt 72.6 kg (160 lb)    SpO2 92%    Breastfeeding No    BMI 29.26 kg/m2       Post-operative hydration adequate. Pain score (VAS): 0 Pain Scale 1: Numeric (0 - 10) (04/24/18 0941)  Pain Intensity 1: 0 (04/24/18 0941)   Managed. Mental status & Level of consciousness: returned to baseline    Neurological status: returned to baseline    Pulmonary status: airway patent, oxygen given as needed. Complications related to anesthesia: none    Patient has met all discharge requirements.     Additional comments:        Lizbeth Isbell MD  April 24, 2018

## 2018-04-25 ENCOUNTER — TELEPHONE (OUTPATIENT)
Dept: CARDIOLOGY CLINIC | Age: 63
End: 2018-04-25

## 2018-04-25 NOTE — TELEPHONE ENCOUNTER
Spoke with patient . .. She is scheduled for an Upgrade from Dual Pacemaker to [address] on 5/14/18 @ 9:15am with Dr. Freeman Jackson. She is a [facility] patient who sees Dr. Reyna Babinski. Stacy or Tobias could you please call her with instructions. Sana Wells  Female, 57 y.o., 1955  Weight:   72.6 kg (160 lb)  Phone:   [phone]  PCP:   Daniele Rocha MD  MRN:   [id number]  MyChart: Active  Next Appt (With Me)  None  Next Appt (Any Provider)  06/06/2018    Message  Received: Aleyda Sharma MD sent to [phone] [facility]                     Please schedule for bivaicd upgrade in next few weeks, hold eliquis 3 days prior.  thx

## 2018-05-08 ENCOUNTER — DOCUMENTATION ONLY (OUTPATIENT)
Dept: CARDIOLOGY CLINIC | Age: 63
End: 2018-05-08

## 2018-05-10 ENCOUNTER — TELEPHONE (OUTPATIENT)
Dept: CARDIOLOGY CLINIC | Age: 63
End: 2018-05-10

## 2018-05-10 DIAGNOSIS — I50.23 ACUTE ON CHRONIC SYSTOLIC CONGESTIVE HEART FAILURE (HCC): Primary | ICD-10-CM

## 2018-05-10 DIAGNOSIS — Z95.0 CARDIAC PACEMAKER IN SITU: ICD-10-CM

## 2018-05-10 NOTE — TELEPHONE ENCOUNTER
Patient given instructions with time allowed for all questions to be answered. She verbalized understanding. Patient states she will have labs drawn tomorrow at [facility].

## 2018-05-10 NOTE — TELEPHONE ENCOUNTER
[facility]          Patient  EP Instructions                  1. You are scheduled to have a BiV AICD upgrade on  May 14, 2018 , at [address]. Please check in at 0815.    2. Please go to [facility] and anita in the outpatient parking lot that is located around to the back of the hospital and enter through the [facility] building. Once you enter through the [facility] check in with the  there. The  will either give you directions or assist you in getting to the cath holding area. 3.  [x]       You are not to eat or drink anything after midnight the morning of the               procedure. 4. Please continue to take your medications with a small sip of water on the morning of the procedure with the following exceptions:  Hold Eliquis 48 hours prior to procedure      5. If you are diabetic, do not take your insulin/sugar pill the morning of the procedure. 6. We encourage families to wait in the waiting room on the first floor while the procedure is being done. The Doctor will come out and talk with you as soon as the procedure is over. 7. There is the possibility that you may spend the night in the hospital, depending on the results of the procedure. This will be determined after the procedure is done. 8.   If you or your family have any questions, please call our office Monday-Friday 9:00am         -4:30 pm , at [phone], and ask to speak to one of the nurses.

## 2018-05-14 ENCOUNTER — ANESTHESIA (OUTPATIENT)
Dept: CARDIAC CATH/INVASIVE PROCEDURES | Age: 63
End: 2018-05-14
Payer: COMMERCIAL

## 2018-05-14 ENCOUNTER — APPOINTMENT (OUTPATIENT)
Dept: GENERAL RADIOLOGY | Age: 63
End: 2018-05-14
Attending: INTERNAL MEDICINE
Payer: COMMERCIAL

## 2018-05-14 ENCOUNTER — HOSPITAL ENCOUNTER (OUTPATIENT)
Dept: CARDIAC CATH/INVASIVE PROCEDURES | Age: 63
Setting detail: OBSERVATION
Discharge: HOME | End: 2018-05-15
Attending: INTERNAL MEDICINE | Admitting: INTERNAL MEDICINE
Payer: COMMERCIAL

## 2018-05-14 ENCOUNTER — ANESTHESIA EVENT (OUTPATIENT)
Dept: CARDIAC CATH/INVASIVE PROCEDURES | Age: 63
End: 2018-05-14
Payer: COMMERCIAL

## 2018-05-14 DIAGNOSIS — Z95.0 CARDIAC PACEMAKER IN SITU: Primary | ICD-10-CM

## 2018-05-14 PROBLEM — Z95.810 AICD (AUTOMATIC CARDIOVERTER/DEFIBRILLATOR) PRESENT: Status: ACTIVE | Noted: 2018-05-14

## 2018-05-14 LAB
ANION GAP SERPL CALC-SCNC: 5 MMOL/L (ref 3–18)
BUN SERPL-MCNC: 29 MG/DL (ref 7–18)
BUN/CREAT SERPL: 33 (ref 12–20)
CALCIUM SERPL-MCNC: 9.4 MG/DL (ref 8.5–10.1)
CHLORIDE SERPL-SCNC: 101 MMOL/L (ref 100–108)
CO2 SERPL-SCNC: 34 MMOL/L (ref 21–32)
CREAT SERPL-MCNC: 0.89 MG/DL (ref 0.6–1.3)
ERYTHROCYTE [DISTWIDTH] IN BLOOD BY AUTOMATED COUNT: 13.2 % (ref 11.6–14.5)
GLUCOSE SERPL-MCNC: 92 MG/DL (ref 74–99)
HCT VFR BLD AUTO: 41.1 % (ref 35–45)
HGB BLD-MCNC: 14.1 G/DL (ref 12–16)
INR PPP: 1 (ref 0.8–1.2)
MCH RBC QN AUTO: 31.7 PG (ref 24–34)
MCHC RBC AUTO-ENTMCNC: 34.3 G/DL (ref 31–37)
MCV RBC AUTO: 92.4 FL (ref 74–97)
PLATELET # BLD AUTO: 214 K/UL (ref 135–420)
PMV BLD AUTO: 11.7 FL (ref 9.2–11.8)
POTASSIUM SERPL-SCNC: 3.2 MMOL/L (ref 3.5–5.5)
PROTHROMBIN TIME: 13 SEC (ref 11.5–15.2)
RBC # BLD AUTO: 4.45 M/UL (ref 4.2–5.3)
SODIUM SERPL-SCNC: 140 MMOL/L (ref 136–145)
WBC # BLD AUTO: 5.6 K/UL (ref 4.6–13.2)

## 2018-05-14 PROCEDURE — 33233 REMOVAL OF PM GENERATOR: CPT

## 2018-05-14 PROCEDURE — 77030031139 HC SUT VCRL2 J&J -A

## 2018-05-14 PROCEDURE — C1887 CATHETER, GUIDING: HCPCS

## 2018-05-14 PROCEDURE — 77030018673

## 2018-05-14 PROCEDURE — C1751 CATH, INF, PER/CENT/MIDLINE: HCPCS

## 2018-05-14 PROCEDURE — 74011250636 HC RX REV CODE- 250/636: Performed by: INTERNAL MEDICINE

## 2018-05-14 PROCEDURE — C1892 INTRO/SHEATH,FIXED,PEEL-AWAY: HCPCS

## 2018-05-14 PROCEDURE — 80048 BASIC METABOLIC PNL TOTAL CA: CPT | Performed by: INTERNAL MEDICINE

## 2018-05-14 PROCEDURE — 33225 L VENTRIC PACING LEAD ADD-ON: CPT

## 2018-05-14 PROCEDURE — 99218 HC RM OBSERVATION: CPT

## 2018-05-14 PROCEDURE — 74011636320 HC RX REV CODE- 636/320: Performed by: INTERNAL MEDICINE

## 2018-05-14 PROCEDURE — C1894 INTRO/SHEATH, NON-LASER: HCPCS

## 2018-05-14 PROCEDURE — 74011636320 HC RX REV CODE- 636/320

## 2018-05-14 PROCEDURE — 71045 X-RAY EXAM CHEST 1 VIEW: CPT

## 2018-05-14 PROCEDURE — 77030019580 HC CBL PACE MEDT -B

## 2018-05-14 PROCEDURE — 77030002933 HC SUT MCRYL J&J -A

## 2018-05-14 PROCEDURE — 74011000250 HC RX REV CODE- 250: Performed by: INTERNAL MEDICINE

## 2018-05-14 PROCEDURE — 77030028698 HC BLD TISS PLSM MEDT -D

## 2018-05-14 PROCEDURE — C1895 LEAD, AICD, ENDO DUAL COIL: HCPCS

## 2018-05-14 PROCEDURE — 85610 PROTHROMBIN TIME: CPT | Performed by: INTERNAL MEDICINE

## 2018-05-14 PROCEDURE — 74011250636 HC RX REV CODE- 250/636

## 2018-05-14 PROCEDURE — 33249 INSJ/RPLCMT DEFIB W/LEAD(S): CPT

## 2018-05-14 PROCEDURE — 85027 COMPLETE CBC AUTOMATED: CPT | Performed by: INTERNAL MEDICINE

## 2018-05-14 PROCEDURE — C1900 LEAD, CORONARY VENOUS: HCPCS

## 2018-05-14 PROCEDURE — 77030002996 HC SUT SLK J&J -A

## 2018-05-14 PROCEDURE — 77030011640 HC PAD GRND REM COVD -A

## 2018-05-14 PROCEDURE — C1769 GUIDE WIRE: HCPCS

## 2018-05-14 PROCEDURE — 77030018729 HC ELECTRD DEFIB PAD CARD -B

## 2018-05-14 PROCEDURE — C1882 AICD, OTHER THAN SING/DUAL: HCPCS

## 2018-05-14 PROCEDURE — 76060000034 HC ANESTHESIA 1.5 TO 2 HR

## 2018-05-14 PROCEDURE — 74011250637 HC RX REV CODE- 250/637: Performed by: INTERNAL MEDICINE

## 2018-05-14 PROCEDURE — 77030006084

## 2018-05-14 RX ORDER — MIDAZOLAM HYDROCHLORIDE 1 MG/ML
INJECTION, SOLUTION INTRAMUSCULAR; INTRAVENOUS AS NEEDED
Status: DISCONTINUED | OUTPATIENT
Start: 2018-05-14 | End: 2018-05-14 | Stop reason: HOSPADM

## 2018-05-14 RX ORDER — LIDOCAINE HYDROCHLORIDE 10 MG/ML
1-60 INJECTION, SOLUTION EPIDURAL; INFILTRATION; INTRACAUDAL; PERINEURAL
Status: DISCONTINUED | OUTPATIENT
Start: 2018-05-14 | End: 2018-05-14 | Stop reason: ALTCHOICE

## 2018-05-14 RX ORDER — PROPOFOL 10 MG/ML
INJECTION, EMULSION INTRAVENOUS
Status: DISCONTINUED | OUTPATIENT
Start: 2018-05-14 | End: 2018-05-14 | Stop reason: HOSPADM

## 2018-05-14 RX ORDER — BUMETANIDE 1 MG/1
1 TABLET ORAL DAILY
Status: DISCONTINUED | OUTPATIENT
Start: 2018-05-14 | End: 2018-05-15 | Stop reason: HOSPADM

## 2018-05-14 RX ORDER — OXYCODONE AND ACETAMINOPHEN 5; 325 MG/1; MG/1
1 TABLET ORAL
Status: DISCONTINUED | OUTPATIENT
Start: 2018-05-14 | End: 2018-05-15 | Stop reason: HOSPADM

## 2018-05-14 RX ORDER — SODIUM CHLORIDE 0.9 % (FLUSH) 0.9 %
5-10 SYRINGE (ML) INJECTION AS NEEDED
Status: DISCONTINUED | OUTPATIENT
Start: 2018-05-14 | End: 2018-05-15 | Stop reason: HOSPADM

## 2018-05-14 RX ORDER — IODIXANOL 320 MG/ML
INJECTION, SOLUTION INTRAVASCULAR AS NEEDED
Status: DISCONTINUED | OUTPATIENT
Start: 2018-05-14 | End: 2018-05-14 | Stop reason: HOSPADM

## 2018-05-14 RX ORDER — CALCIUM CARBONATE 600 MG
1200 TABLET ORAL DAILY
Status: DISCONTINUED | OUTPATIENT
Start: 2018-05-14 | End: 2018-05-15 | Stop reason: HOSPADM

## 2018-05-14 RX ORDER — HEPARIN SODIUM 1000 [USP'U]/ML
1000 INJECTION, SOLUTION INTRAVENOUS; SUBCUTANEOUS ONCE
Status: COMPLETED | OUTPATIENT
Start: 2018-05-14 | End: 2018-05-14

## 2018-05-14 RX ORDER — MELATONIN
1000 DAILY
Status: DISCONTINUED | OUTPATIENT
Start: 2018-05-14 | End: 2018-05-15 | Stop reason: HOSPADM

## 2018-05-14 RX ORDER — FENTANYL CITRATE 50 UG/ML
INJECTION, SOLUTION INTRAMUSCULAR; INTRAVENOUS AS NEEDED
Status: DISCONTINUED | OUTPATIENT
Start: 2018-05-14 | End: 2018-05-14 | Stop reason: HOSPADM

## 2018-05-14 RX ORDER — CARVEDILOL 6.25 MG/1
6.25 TABLET ORAL 2 TIMES DAILY WITH MEALS
Status: DISCONTINUED | OUTPATIENT
Start: 2018-05-14 | End: 2018-05-15 | Stop reason: HOSPADM

## 2018-05-14 RX ORDER — GENTAMICIN SULFATE 40 MG/ML
80 INJECTION, SOLUTION INTRAMUSCULAR; INTRAVENOUS ONCE
Status: COMPLETED | OUTPATIENT
Start: 2018-05-14 | End: 2018-05-14

## 2018-05-14 RX ORDER — ATORVASTATIN CALCIUM 10 MG/1
10 TABLET, FILM COATED ORAL DAILY
Status: DISCONTINUED | OUTPATIENT
Start: 2018-05-14 | End: 2018-05-15 | Stop reason: HOSPADM

## 2018-05-14 RX ORDER — CEFAZOLIN SODIUM 2 G/50ML
2 SOLUTION INTRAVENOUS
Status: DISCONTINUED | OUTPATIENT
Start: 2018-05-14 | End: 2018-05-14 | Stop reason: SDUPTHER

## 2018-05-14 RX ORDER — CEFAZOLIN SODIUM 2 G/50ML
2 SOLUTION INTRAVENOUS ONCE
Status: COMPLETED | OUTPATIENT
Start: 2018-05-14 | End: 2018-05-14

## 2018-05-14 RX ORDER — DESVENLAFAXINE SUCCINATE 50 MG/1
50 TABLET, EXTENDED RELEASE ORAL DAILY
Status: DISCONTINUED | OUTPATIENT
Start: 2018-05-14 | End: 2018-05-15 | Stop reason: HOSPADM

## 2018-05-14 RX ORDER — LOSARTAN POTASSIUM 50 MG/1
25 TABLET ORAL DAILY
Status: DISCONTINUED | OUTPATIENT
Start: 2018-05-14 | End: 2018-05-15 | Stop reason: HOSPADM

## 2018-05-14 RX ORDER — IODIXANOL 320 MG/ML
50 INJECTION, SOLUTION INTRAVASCULAR
Status: COMPLETED | OUTPATIENT
Start: 2018-05-14 | End: 2018-05-14

## 2018-05-14 RX ORDER — CEFAZOLIN SODIUM 2 G/50ML
2 SOLUTION INTRAVENOUS EVERY 8 HOURS
Status: COMPLETED | OUTPATIENT
Start: 2018-05-14 | End: 2018-05-15

## 2018-05-14 RX ORDER — SODIUM CHLORIDE 0.9 % (FLUSH) 0.9 %
5-10 SYRINGE (ML) INJECTION EVERY 8 HOURS
Status: DISCONTINUED | OUTPATIENT
Start: 2018-05-14 | End: 2018-05-15 | Stop reason: HOSPADM

## 2018-05-14 RX ORDER — SODIUM CHLORIDE 9 MG/ML
INJECTION, SOLUTION INTRAVENOUS
Status: DISCONTINUED | OUTPATIENT
Start: 2018-05-14 | End: 2018-05-14 | Stop reason: HOSPADM

## 2018-05-14 RX ORDER — BUPROPION HYDROCHLORIDE 150 MG/1
150 TABLET ORAL
Status: DISCONTINUED | OUTPATIENT
Start: 2018-05-14 | End: 2018-05-15 | Stop reason: HOSPADM

## 2018-05-14 RX ADMIN — CEFAZOLIN SODIUM 2 G: 2 SOLUTION INTRAVENOUS at 09:26

## 2018-05-14 RX ADMIN — MIDAZOLAM HYDROCHLORIDE 2 MG: 1 INJECTION, SOLUTION INTRAMUSCULAR; INTRAVENOUS at 09:23

## 2018-05-14 RX ADMIN — PROPOFOL 70 MCG/KG/MIN: 10 INJECTION, EMULSION INTRAVENOUS at 09:30

## 2018-05-14 RX ADMIN — LIDOCAINE HYDROCHLORIDE 30 ML: 10 INJECTION, SOLUTION EPIDURAL; INFILTRATION; INTRACAUDAL; PERINEURAL at 09:43

## 2018-05-14 RX ADMIN — SODIUM CHLORIDE: 9 INJECTION, SOLUTION INTRAVENOUS at 09:16

## 2018-05-14 RX ADMIN — Medication 10 ML: at 14:00

## 2018-05-14 RX ADMIN — CARVEDILOL 6.25 MG: 6.25 TABLET, FILM COATED ORAL at 18:45

## 2018-05-14 RX ADMIN — OXYCODONE HYDROCHLORIDE AND ACETAMINOPHEN 1 TABLET: 5; 325 TABLET ORAL at 20:30

## 2018-05-14 RX ADMIN — FENTANYL CITRATE 100 MCG: 50 INJECTION, SOLUTION INTRAMUSCULAR; INTRAVENOUS at 09:26

## 2018-05-14 RX ADMIN — IODIXANOL 15 ML: 320 INJECTION, SOLUTION INTRAVASCULAR at 09:41

## 2018-05-14 RX ADMIN — CEFAZOLIN SODIUM 2 G: 2 SOLUTION INTRAVENOUS at 18:46

## 2018-05-14 RX ADMIN — IODIXANOL 15 ML: 320 INJECTION, SOLUTION INTRAVASCULAR at 09:40

## 2018-05-14 RX ADMIN — HEPARIN SODIUM 1000 UNITS: 1000 INJECTION, SOLUTION INTRAVENOUS; SUBCUTANEOUS at 09:42

## 2018-05-14 RX ADMIN — GENTAMICIN SULFATE 80 MG: 40 INJECTION, SOLUTION INTRAMUSCULAR; INTRAVENOUS at 10:31

## 2018-05-14 NOTE — IP AVS SNAPSHOT
[address] [address] [address] Patient: Ayleen French MRN: [id number] A check mare indicates which time of day the medication should be taken. My Medications START taking these medications Instructions Each Dose to Equal  
 Morning Noon Evening Bedtime  
 oxyCODONE-acetaminophen 5-325 mg per tablet Commonly known as:  PERCOCET Your last dose was: Your next dose is: Take 1 Tab by mouth every four (4) hours as needed. Max Daily Amount: 6 Tabs. 1 Tab CONTINUE taking these medications Instructions Each Dose to Equal  
 Morning Noon Evening Bedtime  
 apixaban 5 mg tablet Commonly known as:  Miguel Ross Your last dose was: Your next dose is: Take 1 Tab by mouth two (2) times a day. 5 mg  
    
   
   
   
  
 atorvastatin 10 mg tablet Commonly known as:  LIPITOR Your last dose was: Your next dose is:    
   
   
      
   
   
   
  
 bumetanide 1 mg tablet Commonly known as:  Pete Saba Your last dose was: Your next dose is: Take 1 Tab by mouth daily. 1 mg CALCIUM PO Your last dose was: Your next dose is: Take 1,200 mg by mouth daily. 1200 mg  
    
   
   
   
  
 carvedilol 6.25 mg tablet Commonly known as:  [place] Parrot Your last dose was: Your next dose is: Take 1 Tab by mouth two (2) times daily (with meals). 6.25 mg FISH OIL 1,000 mg Cap Generic drug:  omega-3 fatty acids-vitamin e Your last dose was: Your next dose is: Take 1 Cap by mouth. 1 Cap  
    
   
   
   
  
 losartan 25 mg tablet Commonly known as:  COZAAR Your last dose was: Your next dose is:    
   
   
 take 1 tablet by mouth once daily multivitamin, stress formula tablet Commonly known as:  STRESS TAB Your last dose was: Your next dose is: Take 1 Tab by mouth daily. 1 Tab PRISTIQ 50 mg ER tablet Generic drug:  desvenlafaxine succinate Your last dose was: Your next dose is: Take 50 mg by mouth daily. Indications: GENERALIZED ANXIETY DISORDER  
 50 mg  
    
   
   
   
  
 VITAMIN D3 1,000 unit tablet Generic drug:  cholecalciferol Your last dose was: Your next dose is: Take  by mouth daily. WELLBUTRIN  mg tablet Generic drug:  buPROPion XL Your last dose was: Your next dose is: Take 150 mg by mouth every morning. 150 mg Where to Get Your Medications Information on where to get these meds will be given to you by the nurse or doctor. ! Ask your nurse or doctor about these medications  
  oxyCODONE-acetaminophen 5-325 mg per tablet

## 2018-05-14 NOTE — DISCHARGE INSTRUCTIONS
Disposition:  Will need follow-up with device/wound check in 7-10 days in my office. Please contact office at [phone] to confirm appointment. Main Office:    [address], [address]    Restrictions: For affected arm:  No lifting greater than 10 lbs or lifting elbow above shoulder for 4 weeks. Keep incision clean and dry for a total of 72 hours after procedure. Remove dressing in 24 hours if not already removed. Please remove the steristrips (small white adhesive strips over wound) after 7 days if they have not already fallen off. No hot tubs or pools for 2 weeks. OK to shower with \"pat\" dry incision after 72 hours. No driving ideally for 2 weeks due to concern for airbag. DISCHARGE SUMMARY from Nurse    PATIENT INSTRUCTIONS:    After general anesthesia or intravenous sedation, for 24 hours or while taking prescription Narcotics:  · Limit your activities  · Do not drive and operate hazardous machinery  · Do not make important personal or business decisions  · Do  not drink alcoholic beverages  · If you have not urinated within 8 hours after discharge, please contact your surgeon on call. Report the following to your surgeon:  · Excessive pain, swelling, redness or odor of or around the surgical area  · Temperature over 100.5  · Nausea and vomiting lasting longer than 4 hours or if unable to take medications  · Any signs of decreased circulation or nerve impairment to extremity: change in color, persistent  numbness, tingling, coldness or increase pain  · Any questions    What to do at Home:      If you experience any of the following symptoms:    Temperature greater than 100.5    Have trouble breathing    You passed out ( Lose Consciousness)     Please follow up with Primary Care Provider or Call 911. *  Please give a list of your current medications to your Primary Care Provider.     *  Please update this list whenever your medications are discontinued, doses are      changed, or new medications (including over-the-counter products) are added. *  Please carry medication information at all times in case of emergency situations. These are general instructions for a healthy lifestyle:    No smoking/ No tobacco products/ Avoid exposure to second hand smoke  Surgeon General's Warning:  Quitting smoking now greatly reduces serious risk to your health. Obesity, smoking, and sedentary lifestyle greatly increases your risk for illness    A healthy diet, regular physical exercise & weight monitoring are important for maintaining a healthy lifestyle    You may be retaining fluid if you have a history of heart failure or if you experience any of the following symptoms:  Weight gain of 3 pounds or more overnight or 5 pounds in a week, increased swelling in our hands or feet or shortness of breath while lying flat in bed. Please call your doctor as soon as you notice any of these symptoms; do not wait until your next office visit. Recognize signs and symptoms of STROKE:    F-face looks uneven    A-arms unable to move or move unevenly    S-speech slurred or non-existent    T-time-call 911 as soon as signs and symptoms begin-DO NOT go       Back to bed or wait to see if you get better-TIME IS BRAIN. Warning Signs of HEART ATTACK     Call 911 if you have these symptoms:   Chest discomfort. Most heart attacks involve discomfort in the center of the chest that lasts more than a few minutes, or that goes away and comes back. It can feel like uncomfortable pressure, squeezing, fullness, or pain.  Discomfort in other areas of the upper body. Symptoms can include pain or discomfort in one or both arms, the back, neck, jaw, or stomach.  Shortness of breath with or without chest discomfort.  Other signs may include breaking out in a cold sweat, nausea, or lightheadedness. Don't wait more than five minutes to call 911 - MINUTES MATTER! Fast action can save your life. Calling 911 is almost always the fastest way to get lifesaving treatment. Emergency Medical Services staff can begin treatment when they arrive -- up to an hour sooner than if someone gets to the hospital by car. The discharge information has been reviewed with the patient and spouse. The patient and spouse verbalized understanding. Discharge medications reviewed with the patient and spouse and appropriate educational materials and side effects teaching were provided.   ___________________________________________________________________________________________________________________________________

## 2018-05-14 NOTE — ROUTINE PROCESS
1100: Pt received from EP lab, alert and oriented x4, denies pain. Left chest wall incision site c/d/i; pt verbalize understanding of arm restrictions. Radiology called for STAT portable chest xray; NPO until resulted. Call light within reach. 1230: Pt CXR results complete, Dr. Vamshi Gupta aware of results; stated patient can eat. Surgical site c/d/i.  1500: Call to ICU awaiting to give report.

## 2018-05-14 NOTE — ROUTINE PROCESS
TRANSFER - OUT REPORT:    Verbal report given to RADHA LYNN RN (name) on Ayleen French  being transferred to [facility] progression of care       Report consisted of patients Situation, Background, Assessment and   Recommendations(SBAR). Information from the following report(s) SBAR, Kardex, Procedure Summary and MAR was reviewed with the receiving nurse. Lines:       Opportunity for questions and clarification was provided.       Patient transported with:   [employer]

## 2018-05-14 NOTE — IP AVS SNAPSHOT
[address] Patient: Abraham Durham MRN: [id number] NEYDA:[id number] About your hospitalization You were admitted on:  May 14, 2018 You last received care in the:  [facility] [address] You were discharged on:  May 15, 2018 Why you were hospitalized Your primary diagnosis was:  Aicd (Automatic Cardioverter/Defibrillator) Present Your diagnoses also included:  S/P Biventricular Cardiac Pacemaker Procedure Follow-up Information Follow up With Details Comments Contact Info Ciera Ramirez MD On 5/24/2018 Appointment at 2:30 pm [address] [place] [address] [facility] [address] 
[phone] [place], [place] On 5/22/2018 Appointment at 10:00 am 27 [facility] 270 706 [facility] 
[phone] Liv Dubose MD  As per the office, 6 weeks appointment will be scheduled when patient follow-up on May 22nd. [facility] [address] 
[phone] Your Scheduled Appointments Tuesday May 22, 2018 10:00 AM EDT  
WOUND CHECK with Bp/Ekg Elda Csi Cardio Specialist at [place] [address] [facility] [id number]  
[phone] Wednesday June 06, 2018  9:30 AM EDT Follow Up with Liv Dubose MD  
Cardio Specialist at [place] [address] [facility] [address]  
[phone] Discharge Orders None A check mare indicates which time of day the medication should be taken. My Medications START taking these medications Instructions Each Dose to Equal  
 Morning Noon Evening Bedtime  
 oxyCODONE-acetaminophen 5-325 mg per tablet Commonly known as:  PERCOCET Your last dose was: Your next dose is: Take 1 Tab by mouth every four (4) hours as needed. Max Daily Amount: 6 Tabs. 1 Tab CONTINUE taking these medications Instructions Each Dose to Equal  
 Morning Noon Evening Bedtime  
 apixaban 5 mg tablet Commonly known as:  Miguel Ross Your last dose was: Your next dose is: Take 1 Tab by mouth two (2) times a day. 5 mg  
    
   
   
   
  
 atorvastatin 10 mg tablet Commonly known as:  LIPITOR Your last dose was: Your next dose is:    
   
   
      
   
   
   
  
 bumetanide 1 mg tablet Commonly known as:  Pete Saba Your last dose was: Your next dose is: Take 1 Tab by mouth daily. 1 mg CALCIUM PO Your last dose was: Your next dose is: Take 1,200 mg by mouth daily. 1200 mg  
    
   
   
   
  
 carvedilol 6.25 mg tablet Commonly known as:  Vesta Parrot Your last dose was: Your next dose is: Take 1 Tab by mouth two (2) times daily (with meals). 6.25 mg FISH OIL 1,000 mg Cap Generic drug:  omega-3 fatty acids-vitamin e Your last dose was: Your next dose is: Take 1 Cap by mouth. 1 Cap  
    
   
   
   
  
 losartan 25 mg tablet Commonly known as:  COZAAR Your last dose was: Your next dose is:    
   
   
 take 1 tablet by mouth once daily  
     
   
   
   
  
 multivitamin, stress formula tablet Commonly known as:  STRESS TAB Your last dose was: Your next dose is: Take 1 Tab by mouth daily. 1 Tab PRISTIQ 50 mg ER tablet Generic drug:  desvenlafaxine succinate Your last dose was: Your next dose is: Take 50 mg by mouth daily. Indications: GENERALIZED ANXIETY DISORDER  
 50 mg  
    
   
   
   
  
 VITAMIN D3 1,000 unit tablet Generic drug:  cholecalciferol Your last dose was: Your next dose is: Take  by mouth daily. WELLBUTRIN  mg tablet Generic drug:  buPROPion XL Your last dose was: Your next dose is: Take 150 mg by mouth every morning. 150 mg Where to Get Your Medications Information on where to get these meds will be given to you by the nurse or doctor. ! Ask your nurse or doctor about these medications  
  oxyCODONE-acetaminophen 5-325 mg per tablet Opioid Education Prescription Opioids: What You Need to Know: 
 
Prescription opioids can be used to help relieve moderate-to-severe pain and are often prescribed following a surgery or injury, or for certain health conditions. These medications can be an important part of treatment but also come with serious risks. Opioids are strong pain medicines. Examples include hydrocodone, oxycodone, fentanyl, and morphine. Heroin is an example of an illegal opioid. It is important to work with your health care provider to make sure you are getting the safest, most effective care. WHAT ARE THE RISKS AND SIDE EFFECTS OF OPIOID USE? Prescription opioids carry serious risks of addiction and overdose, especially with prolonged use. An opioid overdose, often marked by slow breathing, can cause sudden death. The use of prescription opioids can have a number of side effects as well, even when taken as directed. · Tolerance-meaning you might need to take more of a medication for the same pain relief · Physical dependence-meaning you have symptoms of withdrawal when the medication is stopped. Withdrawal symptoms can include nausea, sweating, chills, diarrhea, stomach cramps, and muscle aches. Withdrawal can last up to several weeks, depending on which drug you took and how long you took it. · Increased sensitivity to pain · Constipation · Nausea, vomiting, and dry mouth · Sleepiness and dizziness · Confusion · Depression · Low levels of testosterone that can result in lower sex drive, energy, and strength · Itching and sweating RISKS ARE GREATER WITH:      
· History of drug misuse, substance use disorder, or overdose · Mental health conditions (such as depression or anxiety) · Sleep apnea · Older age (72 years or older) · Pregnancy Avoid alcohol while taking prescription opioids. Also, unless specifically advised by your health care provider, medications to avoid include: · Benzodiazepines (such as Xanax or Valium) · Muscle relaxants (such as Soma or Flexeril) · Hypnotics (such as Ambien or Lunesta) · Other prescription opioids KNOW YOUR OPTIONS Talk to your health care provider about ways to manage your pain that don't involve prescription opioids. Some of these options may actually work better and have fewer risks and side effects. Options may include: 
· Pain relievers such as acetaminophen, ibuprofen, and naproxen · Some medications that are also used for depression or seizures · Physical therapy and exercise · Counseling to help patients learn how to cope better with triggers of pain and stress. · Application of heat or cold compress · Massage therapy · Relaxation techniques Be Informed Make sure you know the name of your medication, how much and how often to take it, and its potential risks & side effects. IF YOU ARE PRESCRIBED OPIOIDS FOR PAIN: 
· Never take opioids in greater amounts or more often than prescribed. Remember the goal is not to be pain-free but to manage your pain at a tolerable level. · Follow up with your primary care provider to: · Work together to create a plan on how to manage your pain. · Talk about ways to help manage your pain that don't involve prescription opioids. · Talk about any and all concerns and side effects. · Help prevent misuse and abuse. · Never sell or share prescription opioids · Help prevent misuse and abuse. · Store prescription opioids in a secure place and out of reach of others (this may include visitors, children, friends, and family). · Safely dispose of unused/unwanted prescription opioids: Find your community drug take-back program or your pharmacy mail-back program, or flush them down the toilet, following guidance from the Food and Drug Administration (www.fda.gov/Drugs/ResourcesForYou). · Visit www.cdc.gov/drugoverdose to learn about the risks of opioid abuse and overdose. · If you believe you may be struggling with addiction, tell your health care provider and ask for guidance or call [facility] at 3-127-113-LFVA. Discharge Instructions Disposition: 
Will need follow-up with device/wound check in 7-10 days in my office. Please contact office at [phone] to confirm appointment. Main Office:   
[facility], [address] [place] [address] Restrictions: For affected arm:  No lifting greater than 10 lbs or lifting elbow above shoulder for 4 weeks. Keep incision clean and dry for a total of 72 hours after procedure. Remove dressing in 24 hours if not already removed. Please remove the steristrips (small white adhesive strips over wound) after 7 days if they have not already fallen off. No hot tubs or pools for 2 weeks. OK to shower with \"pat\" dry incision after 72 hours. No driving ideally for 2 weeks due to concern for airbag. DISCHARGE SUMMARY from Nurse PATIENT INSTRUCTIONS: 
 
After general anesthesia or intravenous sedation, for 24 hours or while taking prescription Narcotics: · Limit your activities · Do not drive and operate hazardous machinery · Do not make important personal or business decisions · Do  not drink alcoholic beverages · If you have not urinated within 8 hours after discharge, please contact your surgeon on call. Report the following to your surgeon: 
· Excessive pain, swelling, redness or odor of or around the surgical area · Temperature over 100.5 · Nausea and vomiting lasting longer than 4 hours or if unable to take medications · Any signs of decreased circulation or nerve impairment to extremity: change in color, persistent  numbness, tingling, coldness or increase pain · Any questions What to do at Home: If you experience any of the following symptoms: 
 
Temperature greater than 100.5 Have trouble breathing You passed out ( Lose Consciousness) Please follow up with Primary Care Provider or Call 911. *  Please give a list of your current medications to your Primary Care Provider. *  Please update this list whenever your medications are discontinued, doses are 
    changed, or new medications (including over-the-counter products) are added. *  Please carry medication information at all times in case of emergency situations. These are general instructions for a healthy lifestyle: No smoking/ No tobacco products/ Avoid exposure to second hand smoke Surgeon General's Warning:  Quitting smoking now greatly reduces serious risk to your health. Obesity, smoking, and sedentary lifestyle greatly increases your risk for illness A healthy diet, regular physical exercise & weight monitoring are important for maintaining a healthy lifestyle You may be retaining fluid if you have a history of heart failure or if you experience any of the following symptoms:  Weight gain of 3 pounds or more overnight or 5 pounds in a week, increased swelling in our hands or feet or shortness of breath while lying flat in bed. Please call your doctor as soon as you notice any of these symptoms; do not wait until your next office visit. Recognize signs and symptoms of STROKE: 
 
F-face looks uneven A-arms unable to move or move unevenly S-speech slurred or non-existent T-time-call 911 as soon as signs and symptoms begin-DO NOT go Back to bed or wait to see if you get better-TIME IS BRAIN. Warning Signs of HEART ATTACK Call 911 if you have these symptoms: 
? Chest discomfort. Most heart attacks involve discomfort in the center of the chest that lasts more than a few minutes, or that goes away and comes back. It can feel like uncomfortable pressure, squeezing, fullness, or pain. ? Discomfort in other areas of the upper body. Symptoms can include pain or discomfort in one or both arms, the back, neck, jaw, or stomach. ? Shortness of breath with or without chest discomfort. ? Other signs may include breaking out in a cold sweat, nausea, or lightheadedness. Don't wait more than five minutes to call [address]! Fast action can save your life. Calling 911 is almost always the fastest way to get lifesaving treatment. Emergency Medical Services staff can begin treatment when they arrive  up to an hour sooner than if someone gets to the hospital by car. The discharge information has been reviewed with the patient and spouse. The patient and spouse verbalized understanding. Discharge medications reviewed with the patient and spouse and appropriate educational materials and side effects teaching were provided. ___________________________________________________________________________________________________________________________________ [place] Announcement We are excited to announce that we are making your provider's discharge notes available to you in Boxxett. You will see these notes when they are completed and signed by the physician that discharged you from your recent hospital stay. If you have any questions or concerns about any information you see in [place], please call the Health Information Department where you were seen or reach out to your Primary Care Provider for more information about your plan of care. Introducing [facility]! Dear Zully Vivar: Thank you for requesting a Eyegroove account. Our records indicate that you already have an active Eyegroove account. You can access your account anytime at [email or web address] [facility] [place]/Red Lambda Did you know that you can access your hospital and ER discharge instructions at any time in Eyegroove? You can also review all of your test results from your hospital stay or ER visit. Additional Information If you have questions, please visit the Frequently Asked Questions section of the Eyegroove website at [email or web address] [facility] [place]/Red Lambda/. Remember, Eyegroove is NOT to be used for urgent needs. For medical emergencies, dial 911. Now available from your iPhone and Android! Introducing Bandar Nichole As a [employer] patient, I wanted to make you aware of our electronic visit tool called Bandar Nichole. [employer] 24/7 allows you to connect within minutes with a medical provider 24 hours a day, seven days a week via a mobile device or tablet or logging into a secure website from your computer. You can access Bandar Nichole from anywhere in the [place]. A virtual visit might be right for you when you have a simple condition and feel like you just dont want to get out of bed, or cant get away from work for an appointment, when your regular [employer] provider is not available (evenings, weekends or holidays), or when youre out of town and need minor care. Electronic visits cost only $49 and if the [employer] 24/7 provider determines a prescription is needed to treat your condition, one can be electronically transmitted to a nearby pharmacy*. Please take a moment to enroll today if you have not already done so. The enrollment process is free and takes just a few minutes. To enroll, please download the [employer] 24/7 gala to your tablet or phone, or visit [email or web address] to enroll on your computer. And, as an [address] [place] [address] patient with a Lil Monkey Butt account, the results of your visits will be scanned into your electronic medical record and your primary care provider will be able to view the scanned results. We urge you to continue to see your regular [employer] provider for your ongoing medical care. And while your primary care provider may not be the one available when you seek a Bandar Wilburndallasfin virtual visit, the peace of mind you get from getting a real diagnosis real time can be priceless. For more information on Bandar Wongfin, view our Frequently Asked Questions (FAQs) at [email or web address]. Sincerely, 
 
Serena Acosta MD 
Chief Medical Officer [facility] Joann Lyon *:  certain medications cannot be prescribed via Bandar Cosmodallasfin Providers Seen During Your Hospitalization Provider Specialty Primary office phone Rd Conte MD Cardiology [phone] Your Primary Care Physician (PCP) Primary Care Physician Office Phone Office Fax Stephen Jacque [phone] You are allergic to the following No active allergies Recent Documentation Height Weight Breastfeeding? BMI OB Status Smoking Status 1.575 m 73.6 kg No 29.68 kg/m2 Hysterectomy Never Smoker Emergency Contacts Name Discharge Info Relation Home Work Mobile [address] CAREGIVER [3] Spouse [3] [phone] Patient Belongings The following personal items are in your possession at time of discharge: 
  Dental Appliances: None  Visual Aid: None      Home Medications: None   Jewelry: None  Clothing: At bedside, Shirt, Undergarments    Other Valuables: None Discharge Instructions Attachments/References ICD (IMPLANTABLE CARDIOVERTER-DEFIBRILLATOR): POST-OP (ENGLISH) Patient Handouts Implantable Cardioverter-Defibrillator Placement: What to Expect at Home Your Recovery Implantable cardioverter-defibrillator (ICD) placement is surgery to put an ICD in your chest. An ICD is a small, battery-powered device that fixes life-threatening changes in your heartbeat. If the ICD detects a life-threatening rapid heart rhythm, it tries to slow the rhythm to get it back to normal. If the dangerous rhythm does not stop, the ICD sends an electric shock to the heart to restore a normal rhythm. The device then goes back to its watchful mode. Your chest may be sore where the doctor made the cut (incision) and put in the ICD. You also may have a bruise and mild swelling. These symptoms usually get better in 1 to 2 weeks. You may feel a hard ridge along the incision. This usually gets softer in the months after surgery. You probably will be able to see and feel the outline of the ICD under your skin. You will probably be able to go back to work or your usual routine 1 to 2 weeks after surgery. Your doctor will talk to you about how often you will need to have the ICD checked. When you have an ICD, it is important to avoid electrical devices that can stop your ICD from working right. Check with your doctor about what you need to stay away from, what you need to use with care, and what is okay to use. You will need to stay away from things with strong magnetic and electrical fields such as MRI machines (unless your ICD is safe for an MRI), welding equipment, and power generators. You can use a cell phone, but keep it at least 6 inches away from your ICD. You can safely use most household and office electronics. These include kitchen appliances, electric power tools, and computers. This care sheet gives you a general idea about how long it will take for you to recover. But each person recovers at a different pace. Follow the steps below to get better as quickly as possible. How can you care for yourself at home? Activity ? · Rest when you feel tired. Getting enough sleep will help you recover. ? · Try to walk each day. Start by walking a little more than you did the day before. Bit by bit, increase the amount you walk. Walking boosts blood flow and helps prevent pneumonia and constipation. ? · For 4 to 6 weeks: ¨ Avoid activities that strain your chest or upper arm muscles. This includes pushing a  or vacuum, mopping floors, swimming, or swinging a golf club or tennis racquet. ¨ Do not raise your arm, on the side of your body where the ICD is located, above shoulder level. ¨ Avoid strenuous activities, such as bicycle riding, jogging, weight lifting, or heavy aerobic exercise. ¨ Avoid lifting anything that would make you strain. This may include heavy grocery bags and milk containers, a heavy briefcase or backpack, cat litter or dog food bags, or a child. ? · Ask your doctor when you can drive again. ? · You will probably need to take about 1 to 2 weeks off from work. It depends on the type of work you do and how you feel. ? · Ask your doctor when it is okay for you to have sex. Diet ? · You can eat your normal diet. If your stomach is upset, try bland, low-fat foods like plain rice, broiled chicken, toast, and yogurt. ? · Drink plenty of fluids (unless your doctor tells you not to). Medicines ? · Your doctor will tell you if and when you can restart your medicines. He or she will also give you instructions about taking any new medicines. ? · If you take blood thinners, such as warfarin (Coumadin), clopidogrel (Plavix), or aspirin, be sure to talk to your doctor. He or she will tell you if and when to start taking those medicines again. Make sure that you understand exactly what your doctor wants you to do. ? · Take pain medicines exactly as directed. ¨ If the doctor gave you a prescription medicine for pain, take it as prescribed. ¨ If you are not taking a prescription pain medicine, ask your doctor if you can take an over-the-counter medicine. ¨ Do not take aspirin, ibuprofen (Advil, Motrin), naproxen (Aleve), or other nonsteroidal anti-inflammatory drugs (NSAIDs) unless your doctor says it is okay. ? · If you think your pain medicine is making you sick to your stomach: 
¨ Take your medicine after meals (unless your doctor has told you not to). ¨ Ask your doctor for a different pain medicine. ? · If your doctor prescribed antibiotics, take them as directed. Do not stop taking them just because you feel better. You need to take the full course of antibiotics. Incision care ? · Keep the area clean and dry. ? · Ask your doctor when you can shower or take a bath. ? · If you have strips of tape on the incision, leave the tape on for a week or until it falls off.  
? · Wash the area daily with warm, soapy water, and pat it dry. Don't use hydrogen peroxide or alcohol, which can slow healing. You may cover the area with a gauze bandage if it weeps or rubs against clothing. Exercise ? · Check with your doctor before you start an exercise program. Your doctor may recommend that you work with a physical therapist to learn how to exercise safely with an ICD. Other instructions ? · Carry your ICD identification card with you at all times. The card should include the ICD  and model information. ? · Wear medical alert jewelry that states you have an ICD. You can buy this at most drugstores. ? · Have your ICD checked as often as your doctor recommends. In some cases, this may be done over the phone or the Internet. Carina Castro will give you instructions about how to do this. ? · Talk with your doctor about the possibility of turning off the ICD at the end of life. You can put your wishes about the ICD in an advance directive. Follow-up care is a key part of your treatment and safety.  Be sure to make and go to all appointments, and call your doctor if you are having problems. It's also a good idea to know your test results and keep a list of the medicines you take. When should you call for help? Call 911 anytime you think you may need emergency care. For example, call if: 
? · You passed out (lost consciousness). ? · You have trouble breathing. ?Call your doctor now or seek immediate medical care if: 
? · You receive a shock from your ICD. ? · You are dizzy or lightheaded, or you feel like you may faint. ? · You have pain that does not get better after you take pain medicine. ? · You hear an alarm or feel a vibration from your ICD. ? · You have loose stitches, or your incision comes open. ? · Bright red blood has soaked through the bandage over your incision. ? · You have signs of infection, such as: 
¨ Increased pain, swelling, warmth, or redness. ¨ Red streaks leading from the incision. ¨ Pus draining from the incision. ¨ A fever. ? Watch closely for changes in your health, and be sure to contact your doctor if you have any problems. Where can you learn more? Go to http://itzel-palak.info/. Enter K184 in the search box to learn more about \"Implantable Cardioverter-Defibrillator Placement: What to Expect at Home. \" Current as of: September 21, 2016 Content Version: 11.4 © 3961-5592 Healthwise, [employer]. Care instructions adapted under license by [employer] (which disclaims liability or warranty for this information). If you have questions about a medical condition or this instruction, always ask your healthcare professional. [place] [address] any warranty or liability for your use of this information. Please provide this summary of care documentation to your next provider. Signatures-by signing, you are acknowledging that this After Visit Summary has been reviewed with you and you have received a copy. Patient Signature:  ____________________________________________________________ Date:  ____________________________________________________________  
  
Desi Pane Provider Signature:  ____________________________________________________________ Date:  ____________________________________________________________

## 2018-05-14 NOTE — ANESTHESIA PREPROCEDURE EVALUATION
Anesthetic History   No history of anesthetic complications            Review of Systems / Medical History  Patient summary reviewed and pertinent labs reviewed    Pulmonary  Within defined limits      Sleep apnea: CPAP           Neuro/Psych   Within defined limits           Cardiovascular  Within defined limits          Dysrhythmias       Exercise tolerance: <4 METS     GI/Hepatic/Renal  Within defined limits              Endo/Other  Within defined limits           Other Findings   Comments: Documentation of current medication  Current medications obtained, documented and obtained? YES      Risk Factors for Postoperative nausea/vomiting:       History of postoperative nausea/vomiting? NO       Female? YES       Motion sickness? NO       Intended opioid administration for postoperative analgesia? YES      Smoking Abstinence:  Current Smoker? NO  Elective Surgery? YES  Seen preoperatively by anesthesiologist or proxy prior to day of surgery? YES  Pt abstained from smoking 24 hours prior to anesthesia?  YES    Preventive care/screening for High Blood Pressure:  Aged 18 years and older: YES  Screened for high blood pressure: YES  Patients with high blood pressure referred to primary care provider   for BP management: YES                     Physical Exam    Airway  Mallampati: II  TM Distance: 4 - 6 cm  Neck ROM: normal range of motion   Mouth opening: Normal     Cardiovascular    Rhythm: regular  Rate: normal         Dental    Dentition: Lower partial plate     Pulmonary  Breath sounds clear to auscultation               Abdominal  GI exam deferred       Other Findings            Anesthetic Plan    ASA: 3  Anesthesia type: MAC and general - backup          Induction: Intravenous  Anesthetic plan and risks discussed with: Patient

## 2018-05-14 NOTE — PROGRESS NOTES
51408 is to remove the pacemaker and a BIV AICD is 2 codes . . [id number] and [id number]    Spoke to PEYTON at [facility] to update - BI AICD - 5/14/2018    Auth will be same # as below

## 2018-05-14 NOTE — ROUTINE PROCESS
TRANSFER - IN REPORT:    Verbal report received from Edmond Ribera RN (name) on Dayan Rojo  being received from [facility] (unit) for routine progression of care      Report consisted of patients Situation, Background, Assessment and   Recommendations(SBAR). Information from the following report(s) SBAR, Kardex, Procedure Summary and MAR was reviewed with the receiving nurse. Opportunity for questions and clarification was provided. Assessment completed upon patients arrival to unit and care assumed.

## 2018-05-14 NOTE — ANESTHESIA POSTPROCEDURE EVALUATION
Post-Anesthesia Evaluation and Assessment    Patient: Mary Richmond MRN: [id number]  SSN: [id number]    YOB: 1955  Age: 57 y.o. Sex: female       Cardiovascular Function/Vital Signs  Visit Vitals    /65    Pulse 70    Temp 36.4 °C (97.6 °F)    Resp 19    Ht 5' 2\" (1.575 m)    Wt 70.3 kg (155 lb)    SpO2 94%    Breastfeeding No    BMI 28.35 kg/m2       Patient is status post MAC, general - backup anesthesia for * No procedures listed *. Nausea/Vomiting: None    Postoperative hydration reviewed and adequate. Pain:  Pain Scale 1: Numeric (0 - 10) (05/14/18 1058)  Pain Intensity 1: 0 (05/14/18 1058)   Managed    Neurological Status: At baseline    Mental Status and Level of Consciousness: Arousable    Pulmonary Status:   O2 Device: Room air (05/14/18 1104)   Adequate oxygenation and airway patent    Complications related to anesthesia: None    Post-anesthesia assessment completed.  No concerns    Signed By: Reyes Rodriguez MD     May 14, 2018

## 2018-05-14 NOTE — PROCEDURES
PROCEDURES   - Upgrade of dual chamber Medtronic dual chamber pacemaekr to Medtronic biventricular AICD for worsening heart failure. - Implantation of new left ventricular lead via coronary sinus and right ventricular AICD lead. - Deep sedation with anesthesia assistance. - Left axillary venogram and coronary sinus venogram.   - Capping of retained right ventricular pace/sense lead. Diagnosis:  Dr. Bailey Zhang  -Cheryl Maine symptomatic atrial fibrillation with symptoms of dyspnea and congestive heart failure.    -s/p JINA 4/24/18 with suspected left atrial appendage thrombus despite [facility]  -Nonischemic cardiomyopathy diagnosed July 2017 with EF in the 30% by echo July 2017, EF unchanged by echo 4/24/18. Last stress test 2013, no ischemia.  -Chronic class II systolic heart failure, improved on Bumex.  -Medtronic pacemaker 2013 with normal function placed for symptomatic bradycardia.  -Obstructive sleep apnea on CPAP. -Hypertension, improved. -Dyslipidemia.   -Chronic Eliquis use. -JOSÉ MIGUEL on sleep apnea. -Chronic losartan and coreg use > 3 months.  -Chronic right ventricular pacing from pacemaker > 40%.     PROCEDURE: After informed consent was obtained, the patient was brought to the electrophysiology lab in a fasting, nonsedated state. Left axillary venogram revealed vein patency. The left chest was prepped and draped in the usual sterile fashion. Local lidocaine was infiltrated just below the left clavicle. A 4-cm transverse incision was created in the left chest above the old device. Using electrocautery and blunt dissection, the device was exposed and removed from the pocket. Hemostasis was confirmed. Into the left axillary vein was inserted a 10 and 9-Estonian sheath over a wire after access was obtained. A micropuncture was used for access and one wire was retained. A right ventricular AICD lead was placed, sheath removed, and lead sutured in place with silk suture.   An extended hook Attain multi-purpose coronary sinus sheath along with Wholey wire was then placed in the coronary sinus. A coronary sinus venogram was performed. The CS ostium was very short with rapid taper. There was only 1 anterolateral branch that could accommodate a lead. It was cannulated with a BMW wire, but the lead could not pass. I therefore placed 90 degree subselector to pass along the proximal stenosis and the LV lead was placed over the wire into the branch. Testing confirmed no diaphragmatic stimulation at 10 V and threshold was acceptable. The sheaths were split away and the lead sutured in place with appropriate slack. The pocket was washed with antibiotic solution and the old generator was detached. The old generator was detached and the RV pace/sense lead capped and sutured with silk. A new biventricular MRI compatible generator was attached to the leads. The pocket was closed with 2-0 Vicryl in 2 deep layers. Skin was closed with 4-0 Vicryl. Steri-Strips and a dressing were applied. No DFT testing was performed, as previously discussed. IMPRESSION:   -Successful upgrade of dual chamber pacemaker to biventricular Medtronic AICD for worsening heart failure and RV pacing > 60%. -Plan to monitor overnight and discharge tomorrow if stable. -Resume Eliquis in 48 hours if no bleeding.  -Wound check in my office in 7-10 days and see primary cardiologist, Dr. Sadaf Pandey, in 6 weeks. Thank you kindly for allowing me to participate in this patient's care.   Please do not hesitate to contact me if any questions.    -------------------------------------------------------------------------------------------------------------  AICD Core Measures  Beta-blocker Therapy:   [   ]  Not indicated   [   ]  Intolerant due to [   ]  Allergy/reaction  [   ]  Hypotension   [   ]  Lung disease   [x   ]  Already prescribed  ACEI/ARB Therapy:   [   ]  Not indicated   [   ]  Intolerant due to [   ]  Allergy/reaction  [   ] Hypotension   [   ]  Renal disease   [x   ]  Already prescribed  Indication:  [   ]  Device already in place and changeout due:    [   ]  Previous VT    [   ]  Primary prevention   [   ]  Previous EP study with inducible VT  [   ]  Secondary prevention with documented spontaneous or inducible cardiac arrest/VT          Primary prevention for:   [   ]  High risk for VT/VF due to long QT/HCM or other inherited familial condition   [   ]  CAD w/prior MI and inducible VT/VF during EP study   [   ]  Prior MI with class II/III heart failure and EF <= 35%   [ x  ]  Nonischemic cardiomyopathy with EF < 35% and heart failure class II/III

## 2018-05-14 NOTE — DISCHARGE SUMMARY
[facility]  -  Progress Note      Discharge Summary     Patient: Jeri Deluca MRN: [id number]  SSN: [id number]    YOB: 1955  Age: 57 y.o. Sex: female       Admit Date: 5/14/2018    Discharge Date: 5/14/2018      Admission Diagnoses: Cardiomyopathy, unspecified [I42.9]    Discharge Diagnoses:   Problem List as of 5/14/2018  Date Reviewed: 5/14/2018          Codes Class Noted - Resolved    S/P biventricular cardiac pacemaker procedure ICD-10-CM: Z95.0  ICD-9-CM: V45.01  5/14/2018 - Present        * (Principal)AICD (automatic cardioverter/defibrillator) present ICD-10-CM: Z95.810  ICD-9-CM: V45.02  5/14/2018 - Present    Overview Signed 5/14/2018 10:56 AM by Juan Hernandez MD     Upgrade to biventricular aicd 5/14/18             JOSÉ MIGUEL (obstructive sleep apnea) ICD-10-CM: I86.64  ICD-9-CM: 327.23  8/14/2017 - Present        Acute on chronic systolic congestive heart failure ([facility] [address] ICD-10-CM: I50.23  ICD-9-CM: 428.23, 428.0  7/20/2017 - Present        Bronchiolitis ICD-10-CM: J21.9  ICD-9-CM: 466.19  7/20/2017 - Present        Chest pain ICD-10-CM: R07.9  ICD-9-CM: 786.50  7/19/2017 - Present        Cardiac pacemaker in situ ICD-10-CM: Z95.0  ICD-9-CM: V45.01  2/25/2016 - Present    Overview Signed 8/14/2017  8:12 PM by Trena Lindquist MD     Medtronic 03/2013             Hypertension ICD-10-CM: I11.9  ICD-9-CM: 402.10  Unknown - Present        Dyslipidemia ICD-10-CM: E78.5  ICD-9-CM: 272.4  Unknown - Present        Atrial flutter  ICD-10-CM: I48.92  ICD-9-CM: 427.32  Unknown - Present    Overview Addendum 7/20/2017  2:48 PM by Trena Lindquits MD     CHADS score 1  (-CHF, +HTN, -AGE, -DM, -CVA), S/P Ablation                    Discharge Condition: stable    Hospital Course: Patient presented for elective upgrade of dual chamber permanent pacemaker to biventricular AICD for worsening heart failure. Patient had successful upgrade of device. Tolerated procedure well.    CXR with appropriate lead placement. Device interrogated and working appropriately. HR elevated this AM but had morning medications late and had some incisional pain this AM.    She is hold Eliquis for 48hrs after procedure. She will resume taking on 5/16/18. Consults: none    Significant Diagnostic Studies:   PROCEDURES   - Upgrade of dual chamber Medtronic dual chamber pacemaker to Medtronic biventricular AICD for worsening heart failure. - Implantation of new left ventricular lead via coronary sinus and right ventricular AICD lead. - Deep sedation with anesthesia assistance. - Left axillary venogram and coronary sinus venogram.   - Capping of retained right ventricular pace/sense lead. Disposition: home    Discharge Medications:   Current Discharge Medication List      CONTINUE these medications which have NOT CHANGED    Details   bumetanide (BUMEX) 1 mg tablet Take 1 Tab by mouth daily. Qty: 30 Tab, Refills: 9      carvedilol (COREG) 6.25 mg tablet Take 1 Tab by mouth two (2) times daily (with meals). Qty: 60 Tab, Refills: 9      losartan (COZAAR) 25 mg tablet take 1 tablet by mouth once daily  Refills: 0      atorvastatin (LIPITOR) 10 mg tablet Refills: 0      buPROPion XL (WELLBUTRIN XL) 150 mg tablet Take 150 mg by mouth every morning. CALCIUM PO Take 1,200 mg by mouth daily. multivitamin, stress formula (STRESS TAB) tablet Take 1 Tab by mouth daily. cholecalciferol (VITAMIN D3) 1,000 unit tablet Take  by mouth daily. omega-3 fatty acids-vitamin e (FISH OIL) 1,000 mg cap Take 1 Cap by mouth. Desvenlafaxine SR (PRISTIQ) 50 mg tablet Take 50 mg by mouth daily. Indications: GENERALIZED ANXIETY DISORDER      apixaban (ELIQUIS) 5 mg tablet Resume on 5/16/18: Take 1 Tab by mouth two (2) times a day.   Qty: 60 Tab, Refills: 6             Activity: as directed  Diet: cardiac  Wound Care: as intstructed    Follow-up Appointments   Procedures    FOLLOW UP VISIT Appointment in: One Week Wound check in 7-10 days in our office. Follow up with Dr Troy Muñoz in 6 weeks. Wound check in 7-10 days in our office. Follow up with Dr Troy Muñoz in 6 weeks. Standing Status:   Standing     Number of Occurrences:   1     Order Specific Question:   Appointment in     Answer:    One Week       Signed By: SOY Mccallum     May 14, 2018

## 2018-05-14 NOTE — H&P
H&P Note    Consultation request by Dr. Raven Prater for upgrade to biventricular AICD. Date of  Admission: 5/14/18   Primary Care Physician:  Ida Tidwell MD     Assessment:     -Paroxsymal symptomatic atrial fibrillation with symptoms of dyspnea and congestive heart failure.    -s/p JINA 4/24/18 with suspected left atrial appendage thrombus despite [address]  -Nonischemic cardiomyopathy diagnosed July 2017 with EF in the 30% by echo July 2017, EF unchanged by echo 4/24/18. Last stress test 2013, no ischemia.  -Chronic class II systolic heart failure, improved on Bumex.  -Medtronic pacemaker 2013 with normal function placed for symptomatic bradycardia.  -Obstructive sleep apnea on CPAP. -Hypertension, improved. -Dyslipidemia.   -Chronic Eliquis use. -JOSÉ MIGUEL on sleep apnea. -Chronic losartan and coreg use > 3 months.  -Chronic right ventricular pacing from pacemaker > 40%. Primary cardiologist Dr. Joaquin Luna:     Plan possible upgrade of pacemaker to biventricular aicd with new RV and LVl leads. I discussed 90% chance of placement of leads. All risks, benefits, alternatives discussed. Plan moderate sedation if anesthesia not available. Risks included but not limited to pain, infection, bleeding, deep venous thrombosis with chronic swelling of arm, anesthesia reactions, pneumothorax, hemothorax, emergent open heart surgery, and death. All questions answered. 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 If not MRI compatible device, I discussed inability to have future MRI scans but CT scans are acceptable. History of Present Illness:     A 57 y. o. female referred by Dr. Raven Prater for evaluation for atrial fibrillation and cardiomyopathy. She has a history of atrial flutter dating back to 2013 as well as a pacemaker in 2015. She has been doing relatively well. She had a heart failure exacerbation July 2017 and ejection fraction was noted to be in the 30's. She has been taking diuretics, controlling her blood pressure and monitoring salt and has lost almost 40 pounds. She has palpitations from time to time and she seems to be more shortness of breath. She is followed by Dr. Mitchell Emmanuel from pulmonary for her underlying bronchial disease. She denies syncope, chest pain, PND, orthopnea. She is on Bumex, AV blocker and ARB. She is here to discuss options. JINA done 4/24/18 for possible atrial fibrillation was cancelled due to RITIKA thrombus despite [address]. She also had significant biatrial enlargement and plan was to proceed with upgrade to biventricular aicd instead of ablation due to high risk and low chance of success.     Review of Symptoms:  Except as stated above include:  Constitutional:  Negative  Ears, nose, throat:  Negative  Respiratory:  dyspnea  Cardiovascular:  negative  Gastrointestinal: negative  Genitourinary:  negative  Musculoskeletal:  Negative  Neurological:  Negative  Dermatological:  Negative  Hematological: Negative  Endocrinological: Negative  Allergy: Negative  Psychological:  Negative       Past Medical History:     Past Medical History:   Diagnosis Date    Atrial flutter     CHADS score 1  (-CHF, +HTN, -AGE, -DM, -CVA)    Atrial flutter ablation     3/13    Cardiomyopathy ([facility] [address]     LVEF 30% (03/17)    Dyslipidemia     Hypertension     Obstructive sleep apnea     CPAP    Pacemaker     3/13 (MEDTRONIC)         Social History:     Social History     Social History    Marital status:      Spouse name: N/A    Number of children: N/A    Years of education: N/A     Social History Main Topics    Smoking status: Never Smoker    Smokeless tobacco: Never Used    Alcohol use Yes    Drug use: No    Sexual activity: Not on file     Other Topics Concern    Not on file     Social History Narrative        Family History:     Family History   Problem Relation Age of Onset    Breast Cancer Sister 59        Medications:   No Known Allergies     Current Outpatient Prescriptions   Medication Sig    apixaban (ELIQUIS) 5 mg tablet Take 1 Tab by mouth two (2) times a day.  bumetanide (BUMEX) 1 mg tablet Take 1 Tab by mouth daily.  carvedilol (COREG) 6.25 mg tablet Take 1 Tab by mouth two (2) times daily (with meals).  losartan (COZAAR) 25 mg tablet take 1 tablet by mouth once daily    atorvastatin (LIPITOR) 10 mg tablet     buPROPion XL (WELLBUTRIN XL) 150 mg tablet Take 150 mg by mouth every morning.  CALCIUM PO Take 1,200 mg by mouth daily.  multivitamin, stress formula (STRESS TAB) tablet Take 1 Tab by mouth daily.  cholecalciferol (VITAMIN D3) 1,000 unit tablet Take  by mouth daily.  omega-3 fatty acids-vitamin e (FISH OIL) 1,000 mg cap Take 1 Cap by mouth.  Desvenlafaxine SR (PRISTIQ) 50 mg tablet Take 50 mg by mouth daily. Indications: GENERALIZED ANXIETY DISORDER     No current facility-administered medications for this encounter. Physical Exam:   There were no vitals taken for this visit.   BP Readings from Last 3 Encounters:   04/24/18 149/73   03/23/18 119/71   02/21/18 112/66     Pulse Readings from Last 3 Encounters:   04/24/18 60   03/23/18 72   02/21/18 68     Wt Readings from Last 3 Encounters:   04/24/18 72.6 kg (160 lb)   03/23/18 73.9 kg (163 lb)   02/21/18 75.8 kg (167 lb)       General:  alert, cooperative, no distress, appears stated age  Neck:  nontender  Lungs:  clear to auscultation bilaterally  Heart:  regular rate and rhythm, S1, S2 normal, no murmur, click, rub or gallop, pacemaker in place  Abdomen:  abdomen is soft without significant tenderness, masses, organomegaly or guarding  Extremities:  extremities normal, atraumatic, no cyanosis or edema  Skin: Warm and dry. no hyperpigmentation, vitiligo, or suspicious lesions  Neuro: alert, oriented x3, affect appropriate, no focal neurological deficits, moves all extremities well, no involuntary movements, reflexes at knee and ankle intact  Psych: non focal     Data Review:     No results for input(s): WBC, HGB, HCT, PLT, HGBEXT, HCTEXT, PLTEXT in the last 72 hours. No results for input(s): NA, K, CL, CO2, GLU, BUN, CREA, CA, MG, PHOS, ALB, TBIL, SGOT, ALT, INR in the last 72 hours. No lab exists for component:  BNP, INREXT    Results for orders placed or performed in visit on 02/21/18   AMB POC EKG ROUTINE W/ 12 LEADS, INTER & REP    Impression    Patient saw Dr. Marshall Neal same day. Dr. Jonathan Angel appt not needed same day. Verbal order and read back per Brenda Figueroa MD     Results for orders placed or performed during the hospital encounter of 07/18/17   EKG, 12 LEAD, INITIAL   Result Value Ref Range    Ventricular Rate 96 BPM    Atrial Rate 96 BPM    P-R Interval 192 ms    QRS Duration 178 ms    Q-T Interval 404 ms    QTC Calculation (Bezet) 510 ms    Calculated P Axis 93 degrees    Calculated R Axis -76 degrees    Calculated T Axis 91 degrees    Diagnosis       Electronic ventricular pacemaker  When compared with ECG of 06-MAR-2013 20:39,  Electronic ventricular pacemaker has replaced Sinus rhythm  Vent.  rate has increased BY  44 BPM  Confirmed by Elmyra Slice ([id number]) on 7/19/2017 1:14:20 PM     Results for orders placed or performed in visit on 09/03/14   PACEMAKER CHECK    Impression    V-paced - 99%; A-sensed - 68%; A-paced - 33%; V-sensed - 2%; Lead  impedances and threshold WNL; some mode switching and runs of  PVCs       All Cardiac Markers in the last 24 hours:  No results found for: CPK, CK, CKMMB, CKMB, RCK3, CKMBT, CKNDX, CKND1, DEREK, TROPT, TROIQ, ALIZA, TROPT, TNIPOC, BNP, BNPP    Last Lipid:    Lab Results   Component Value Date/Time    Cholesterol, total 222 (H) 07/12/2010 08:49 AM HDL Cholesterol 51 07/12/2010 08:49 AM    LDL, calculated 150 (H) 07/12/2010 08:49 AM    Triglyceride 105 07/12/2010 08:49 AM    CHOL/HDL Ratio 4.4 07/12/2010 08:49 AM       Signed By: Maribell Christensen MD     May 14, 2018

## 2018-05-15 ENCOUNTER — APPOINTMENT (OUTPATIENT)
Dept: GENERAL RADIOLOGY | Age: 63
End: 2018-05-15
Attending: INTERNAL MEDICINE
Payer: COMMERCIAL

## 2018-05-15 VITALS
SYSTOLIC BLOOD PRESSURE: 131 MMHG | TEMPERATURE: 98.4 F | WEIGHT: 162.26 LBS | HEIGHT: 62 IN | DIASTOLIC BLOOD PRESSURE: 59 MMHG | OXYGEN SATURATION: 99 % | BODY MASS INDEX: 29.86 KG/M2 | RESPIRATION RATE: 20 BRPM | HEART RATE: 120 BPM

## 2018-05-15 PROCEDURE — 74011250636 HC RX REV CODE- 250/636: Performed by: INTERNAL MEDICINE

## 2018-05-15 PROCEDURE — 71046 X-RAY EXAM CHEST 2 VIEWS: CPT

## 2018-05-15 PROCEDURE — 99218 HC RM OBSERVATION: CPT

## 2018-05-15 PROCEDURE — 74011250637 HC RX REV CODE- 250/637: Performed by: INTERNAL MEDICINE

## 2018-05-15 RX ORDER — OXYCODONE AND ACETAMINOPHEN 5; 325 MG/1; MG/1
1 TABLET ORAL
Qty: 15 TAB | Refills: 0 | Status: SHIPPED | OUTPATIENT
Start: 2018-05-15 | End: 2018-05-15

## 2018-05-15 RX ORDER — OXYCODONE AND ACETAMINOPHEN 5; 325 MG/1; MG/1
1 TABLET ORAL
Qty: 15 TAB | Refills: 0 | Status: SHIPPED | OUTPATIENT
Start: 2018-05-15 | End: 2019-04-24 | Stop reason: ALTCHOICE

## 2018-05-15 RX ADMIN — DESVENLAFAXINE SUCCINATE 50 MG: 50 TABLET, EXTENDED RELEASE ORAL at 08:58

## 2018-05-15 RX ADMIN — BUMETANIDE 1 MG: 1 TABLET ORAL at 08:58

## 2018-05-15 RX ADMIN — LOSARTAN POTASSIUM 25 MG: 50 TABLET, FILM COATED ORAL at 08:58

## 2018-05-15 RX ADMIN — VITAMIN D, TAB 1000IU (100/BT) 1000 UNITS: 25 TAB at 08:59

## 2018-05-15 RX ADMIN — BUPROPION HYDROCHLORIDE 150 MG: 150 TABLET, FILM COATED, EXTENDED RELEASE ORAL at 08:59

## 2018-05-15 RX ADMIN — CARVEDILOL 6.25 MG: 6.25 TABLET, FILM COATED ORAL at 08:59

## 2018-05-15 RX ADMIN — ATORVASTATIN CALCIUM 10 MG: 10 TABLET, FILM COATED ORAL at 08:58

## 2018-05-15 RX ADMIN — Medication 10 ML: at 07:25

## 2018-05-15 RX ADMIN — Medication 1200 MG: at 08:59

## 2018-05-15 RX ADMIN — Medication 1 CAPSULE: at 08:58

## 2018-05-15 RX ADMIN — CEFAZOLIN SODIUM 2 G: 2 SOLUTION INTRAVENOUS at 07:22

## 2018-05-15 NOTE — PROGRESS NOTES
Received bedside report from [address] Pt Aox4, Lt. Upper chest AICD/biventricular dressing clean/dry/intact, pt sitting up in bed, complains of discomfort denies need for pain med at this time. Pt in bed/bed in low position, wheels locked, call bell within reach. 0750-Transport arrived to take pt for [place] Chest xray/this nurse accompanied pt.     0815-Arrived back on floor from Radiology, pt connected back to monitor, in bed resting quietly.

## 2018-05-15 NOTE — ROUTINE PROCESS
Bedside shift change report given to Nate Oliver (oncoming nurse) by Jovana Piedra (offgoing nurse). Report included the following information SBAR, ED Summary, Intake/Output and MAR.

## 2018-05-15 NOTE — PROGRESS NOTES
Reason for Admission: S?P biventricular cardiac pacemaker procedure. AICD                   RRAT Score:  12                    Plan for utilizing home health:                          Likelihood of Readmission:  Green/Low                         Transition of Care Plan:    Pt is alert and oriented. She resides in the home with her spouse  Johanna Payne ([phone]) and her plan is to return home at discharge. Pt indicates being totally independent with ADLs and ambulation prior to admission. Pt shares that she visits with her PCP every 4 months and has an upcoming appt scheduled in June. Pt's spouse will assist her with transportation at discharge. Guide to observation/outpatient care and Medicare observation was provided. Pt signed and forms placed in pt's paper lite chart. Copy provided to pt. Transition of Care (LILLIAN) Plan:  Pt is alert and oriented. She resides in the home with her spouse  Johanna Payne ([phone]) and her plan is to return home at discharge. Pt indicates being totally independent with ADLs and ambulation prior to admission. Pt shares that she visits with her PCP every 4 months and has an upcoming appt scheduled in June. Pt's spouse will assist her with transportation at discharge. Guide to observation/outpatient care and Medicare observation was provided. Pt signed and forms placed in pt's paper lite chart. Copy provided to pt. LILLIAN Transportation:       How is patient being transported at discharge? Pt's spouse Johanna Payne     When? 05/15/2018     Is transport scheduled? Follow-up appointment and transportation:     PCP? Anna Dowling     Specialist?     Time/Date? Who is transporting to the follow-up appointment? Is transport for follow up appointment scheduled? Communication plan (with patient/family): Who is being called? Patient or Next of Kin? Responsible party?      Patient      What number(s) is to be used?  [phone]      What service provider is calling for [facility] services? When are they calling? Readmission Risk? (Green/Low; Yellow/Moderate; Red/High): Green/Low      Care Management Interventions  PCP Verified by CM: Yes  Current Support Network: Lives with Spouse  Confirm Follow Up Transport: Family  Plan discussed with Pt/Family/Caregiver:  Yes

## 2018-05-15 NOTE — PROGRESS NOTES
Pt provided discharge instructions. Opportunity for questions and answers provided. Pt provided education/hardcopy Percocet. Armband/PIV/Telemetry box removed and shredded.

## 2018-05-16 NOTE — COMMUNICATION BODY
Subjective:      Aurora Cabrera is in the office today for cardiac reevaluation. She is a 73-year-old woman with hypertension, history of atrial flutter with symptomatic bradycardia, and prior atrial flutter ablation, as well as permanent pacemaker implantation in March of 2015. She was admitted to [facility] in 06/2017 with increasing shortness of breath and chest pressure. She was felt to have acute on chronic systolic failure. Her failure resolved fairly quickly with intravenous diuretics with a normalization of her chest x-ray, as well as her BNP. In January and early February of this year she began to experience more SOB and was found to be in atrial fibrillation. She was evaluated by Dr Alejandro Rice and was sent for an update of her LV systolic function and chamber sizes. In the office today, she says she feels \"okay\". She senses a fluttering under her left breast.            Patient Active Problem List    Diagnosis Date Noted    JOSÉ MIGUEL (obstructive sleep apnea) 08/14/2017    Acute on chronic systolic congestive heart failure ([facility] [address] 07/20/2017    Bronchiolitis 07/20/2017    Chest pain 07/19/2017    Cardiac pacemaker in situ 02/25/2016    Hypertension     Dyslipidemia     Atrial flutter       Current Outpatient Prescriptions   Medication Sig Dispense Refill    apixaban (ELIQUIS) 5 mg tablet Take 1 Tab by mouth two (2) times a day. 60 Tab 6    bumetanide (BUMEX) 1 mg tablet Take 1 Tab by mouth daily. 30 Tab 9    carvedilol (COREG) 6.25 mg tablet Take 1 Tab by mouth two (2) times daily (with meals). 60 Tab 9    losartan (COZAAR) 25 mg tablet take 1 tablet by mouth once daily  0    atorvastatin (LIPITOR) 10 mg tablet   0    buPROPion XL (WELLBUTRIN XL) 150 mg tablet Take 150 mg by mouth every morning.  CALCIUM PO Take 1,200 mg by mouth daily.  multivitamin, stress formula (STRESS TAB) tablet Take 1 Tab by mouth daily.         cholecalciferol (VITAMIN D3) 1,000 unit tablet Take  by mouth daily.  omega-3 fatty acids-vitamin e (FISH OIL) 1,000 mg cap Take 1 Cap by mouth.  Desvenlafaxine SR (PRISTIQ) 50 mg tablet Take 50 mg by mouth daily. Indications: GENERALIZED ANXIETY DISORDER       No Known Allergies  Past Medical History:   Diagnosis Date    Atrial flutter     CHADS score 1  (-CHF, +HTN, -AGE, -DM, -CVA)    Atrial flutter ablation     3/13    Cardiomyopathy ([facility] [address]     LVEF 30% (03/17)    Dyslipidemia     Hypertension     Obstructive sleep apnea     CPAP    Pacemaker     3/13 (MEDTRONIC)     Past Surgical History:   Procedure Laterality Date    HX HYSTERECTOMY      ? years ago    HX OOPHORECTOMY       Family History   Problem Relation Age of Onset    Breast Cancer Sister 59     History   Smoking Status    Never Smoker   Smokeless Tobacco    Never Used          Review of Systems, additional:  Constitutional: negative  Eyes: negative  Respiratory: negative  Cardiovascular: negative  Gastrointestinal: negative  Musculoskeletal:negative  Neurological: negative  Behvioral/Psych: negative  Endocrine: negative  ENT: negative    Objective:     Visit Vitals    /71    Pulse 72    Ht 5' 2\" (1.575 m)    Wt 163 lb (73.9 kg)    SpO2 97%    BMI 29.81 kg/m2     General:  alert, cooperative, no distress   Chest Wall: inspection normal - no chest wall deformities or tenderness, respiratory effort normal   Lung: clear to auscultation bilaterally   Heart:  normal rate and regular rhythm, no murmurs noted, no gallops noted   Abdomen: soft, non-tender. Bowel sounds normal. No masses,  no organomegaly   Extremities: extremities normal, atraumatic, no cyanosis or edema Skin: no rashes   Neuro: alert, oriented, normal speech, no focal findings or movement disorder noted         Assessment/Plan:       ICD-10-CM ICD-9-CM    1. Acute on chronic systolic congestive heart failure ([facility] [address]  Episode in 06/2017 with hospitalization at [facility]. I50.23 428.23      428.0    2.  Typical atrial flutter ([facility] [address] S/P ablation 03/2013 with return of atrial dysrhythmia. CHADS score 1. Recent EP evaluation. Plan was to repeat Echo which demonstrated an EF of 35% and normal LA size. Patient now to be considered for repeat ablative procedure. I48.3 427.32    3. Hypertension, well controlled in office today. I11.9 402.10    4. Cardiac pacemaker in situ, Medtronic 03/2013 Z95.0 V45.01    5. Chest pain, unspecified type R07.9 786.50    6.  Dyslipidemia E78.5 272.4    7. JOSÉ MIGUEL (obstructive sleep apnea) G47.33 327.23

## 2018-05-22 ENCOUNTER — OFFICE VISIT (OUTPATIENT)
Dept: CARDIOLOGY CLINIC | Age: 63
End: 2018-05-22

## 2018-05-22 DIAGNOSIS — Z95.810 AICD (AUTOMATIC CARDIOVERTER/DEFIBRILLATOR) PRESENT: Primary | ICD-10-CM

## 2018-05-22 NOTE — PROGRESS NOTES
Patient here for wound check since AICD implant last week. She has 4 steri-strips intact. No redness, swelling or oozing. Patient states she is doing well. Verbal order and read back per Dr. Vu Mathew healing well.  Keep follow up visit with Dr. Tiesha Ghotra

## 2018-05-22 NOTE — MR AVS SNAPSHOT
[address] [facility] [address] [address] 
[phone] Patient: Tamara Coates MRN: [id number] JTT:59/64/[id number] Visit Information Date & Time Provider Department Dept. Phone Encounter #  
 5/22/2018 10:00 AM Bp/Ekg Andrew Leslie Cardio Specialist at [place] [address] [id number] Your Appointments 6/6/2018  9:30 AM  
Follow Up with Renee Shipman MD  
Cardio Specialist at [address]) Appt Note: 10 weeks [facility] [address] 6/13/2018  9:15 AM  
PROCEDURE with Ruiz Schroeder Csi Cardio Specialist at [address]) Appt Note: new implant - one month [facility] [address] Upcoming Health Maintenance Date Due Hepatitis C Screening 1955 Pneumococcal 19-64 Medium Risk (1 of 1 - PPSV23) 12/25/1972 DTaP/Tdap/Td series (1 - Tdap) 12/25/1974 FOBT Q 1 YEAR AGE 50-75 12/25/2003 ZOSTER VACCINE AGE 60> 10/25/2013 PAP AKA CERVICAL CYTOLOGY 3/21/2015 Influenza Age 5 to Adult 8/1/2018 BREAST CANCER SCRN MAMMOGRAM 12/29/2019 Allergies as of 5/22/2018  Review Complete On: 5/14/2018 By: Alvarado Meter No Known Allergies Current Immunizations  Never Reviewed No immunizations on file. Not reviewed this visit Vitals OB Status Smoking Status Hysterectomy Never Smoker Your Updated Medication List  
  
   
This list is accurate as of 5/22/18 10:14 AM.  Always use your most recent med list.  
  
  
  
  
 apixaban 5 mg tablet Commonly known as:  César Covington Take 1 Tab by mouth two (2) times a day. atorvastatin 10 mg tablet Commonly known as:  LIPITOR  
  
 bumetanide 1 mg tablet Commonly known as:  Miracle Louis Take 1 Tab by mouth daily. CALCIUM PO Take 1,200 mg by mouth daily. carvedilol 6.25 mg tablet Commonly known as:  Francenia Elders Take 1 Tab by mouth two (2) times daily (with meals). FISH OIL 1,000 mg Cap Generic drug:  omega-3 fatty acids-vitamin e Take 1 Cap by mouth.  
  
 losartan 25 mg tablet Commonly known as:  COZAAR  
take 1 tablet by mouth once daily  
  
 multivitamin, stress formula tablet Commonly known as:  STRESS TAB Take 1 Tab by mouth daily. oxyCODONE-acetaminophen 5-325 mg per tablet Commonly known as:  PERCOCET Take 1 Tab by mouth every four (4) hours as needed. Max Daily Amount: 6 Tabs. PRISTIQ 50 mg ER tablet Generic drug:  desvenlafaxine succinate Take 50 mg by mouth daily. Indications: GENERALIZED ANXIETY DISORDER  
  
 VITAMIN D3 1,000 unit tablet Generic drug:  cholecalciferol Take  by mouth daily. WELLBUTRIN  mg tablet Generic drug:  buPROPion XL Take 150 mg by mouth every morning. Introducing [facility] & HEALTH SERVICES! Dear Davidson Mann: Thank you for requesting a [employer] account. Our records indicate that you already have an active [employer] account. You can access your account anytime at [email or web address] [email or web address] & BANDS Did you know that you can access your hospital and ER discharge instructions at any time in [employer]? You can also review all of your test results from your hospital stay or ER visit. Additional Information If you have questions, please visit the Frequently Asked Questions section of the [employer] website at [email or web address] & [email or web address] Remember, [employer] is NOT to be used for urgent needs. For medical emergencies, dial 911. Now available from your iPhone and Android! Please provide this summary of care documentation to your next provider. Your primary care clinician is listed as CATHY Romo.  If you have any questions after today's visit, please call [phone].

## 2018-06-06 ENCOUNTER — OFFICE VISIT (OUTPATIENT)
Dept: CARDIOLOGY CLINIC | Age: 63
End: 2018-06-06

## 2018-06-06 VITALS
OXYGEN SATURATION: 97 % | BODY MASS INDEX: 29.08 KG/M2 | HEART RATE: 92 BPM | HEIGHT: 62 IN | SYSTOLIC BLOOD PRESSURE: 127 MMHG | DIASTOLIC BLOOD PRESSURE: 77 MMHG | WEIGHT: 158 LBS

## 2018-06-06 DIAGNOSIS — I48.3 TYPICAL ATRIAL FLUTTER (HCC): ICD-10-CM

## 2018-06-06 DIAGNOSIS — G47.33 OSA (OBSTRUCTIVE SLEEP APNEA): ICD-10-CM

## 2018-06-06 DIAGNOSIS — Z95.0 S/P BIVENTRICULAR CARDIAC PACEMAKER PROCEDURE: ICD-10-CM

## 2018-06-06 DIAGNOSIS — Z95.0 CARDIAC PACEMAKER IN SITU: ICD-10-CM

## 2018-06-06 DIAGNOSIS — Z95.810 AICD (AUTOMATIC CARDIOVERTER/DEFIBRILLATOR) PRESENT: ICD-10-CM

## 2018-06-06 DIAGNOSIS — I50.23 ACUTE ON CHRONIC SYSTOLIC CONGESTIVE HEART FAILURE (HCC): ICD-10-CM

## 2018-06-06 DIAGNOSIS — E78.5 DYSLIPIDEMIA: ICD-10-CM

## 2018-06-06 DIAGNOSIS — I11.9 BENIGN HYPERTENSIVE HEART DISEASE WITHOUT HEART FAILURE: Primary | ICD-10-CM

## 2018-06-06 NOTE — MR AVS SNAPSHOT
[address] [facility] [address] [address] 
[phone] Patient: Alona Ingram MRN: [id number] THOMAS:18/94/[birth year]25 Visit Information Date & Time Provider Department Dept. Phone Encounter #  
 6/6/2018  9:30 AM Ally Maynard MD Michaela Luque [facility] Specialist at [facility] [phone] [id number] Follow-up Instructions Return in about 8 weeks (around 8/1/2018). Your Appointments 6/13/2018  9:15 AM  
PROCEDURE with Ruiz Lesteri Cardio Specialist at [facility]) Appt Note: new implant - one month [facility] [address] [facility]  
  
    
 8/6/2018 10:00 AM  
Follow Up with Ally Maynard MD  
Cardio Specialist at [facility]) Appt Note: 8 f/u with Dr Haim Lockett  
 [facility] [address] Upcoming Health Maintenance Date Due Hepatitis C Screening 1955 Pneumococcal 19-64 Medium Risk (1 of 1 - PPSV23) 12/25/1972 DTaP/Tdap/Td series (1 - Tdap) 12/25/1974 FOBT Q 1 YEAR AGE 50-75 12/25/2003 ZOSTER VACCINE AGE 60> 10/25/2013 PAP AKA CERVICAL CYTOLOGY 3/21/2015 Influenza Age 5 to Adult 8/1/2018 BREAST CANCER SCRN MAMMOGRAM 12/29/2019 Allergies as of 6/6/2018  Review Complete On: 6/6/2018 By: Mei Ugalde No Known Allergies Current Immunizations  Never Reviewed No immunizations on file. Not reviewed this visit Vitals BP Pulse Height(growth percentile) Weight(growth percentile) SpO2 BMI  
 127/77 92 5' 2\" (1.575 m) 158 lb (71.7 kg) 97% 28.9 kg/m2 OB Status Smoking Status Hysterectomy Never Smoker Vitals History BMI and BSA Data  Body Mass Index Body Surface Area  
 28.9 kg/m 2 1.77 m 2  
  
  
 Preferred Pharmacy Pharmacy Name Phone [address]  [facility] [phone] Your Updated Medication List  
  
   
This list is accurate as of 6/6/18 10:20 AM.  Always use your most recent med list.  
  
  
  
  
 apixaban 5 mg tablet Commonly known as:  Artice Skyla Take 1 Tab by mouth two (2) times a day. atorvastatin 10 mg tablet Commonly known as:  LIPITOR  
  
 bumetanide 1 mg tablet Commonly known as:  Linda Lathe Take 1 Tab by mouth daily. CALCIUM PO Take 1,200 mg by mouth daily. carvedilol 6.25 mg tablet Commonly known as:  Heard Pennant Take 1 Tab by mouth two (2) times daily (with meals). FISH OIL 1,000 mg Cap Generic drug:  omega-3 fatty acids-vitamin e Take 1 Cap by mouth.  
  
 losartan 25 mg tablet Commonly known as:  COZAAR  
take 1 tablet by mouth once daily  
  
 multivitamin, stress formula tablet Commonly known as:  STRESS TAB Take 1 Tab by mouth daily. oxyCODONE-acetaminophen 5-325 mg per tablet Commonly known as:  PERCOCET Take 1 Tab by mouth every four (4) hours as needed. Max Daily Amount: 6 Tabs. PRISTIQ 50 mg ER tablet Generic drug:  desvenlafaxine succinate Take 50 mg by mouth daily. Indications: GENERALIZED ANXIETY DISORDER  
  
 VITAMIN D3 1,000 unit tablet Generic drug:  cholecalciferol Take  by mouth daily. WELLBUTRIN  mg tablet Generic drug:  buPROPion XL Take 150 mg by mouth every morning. Follow-up Instructions Return in about 8 weeks (around 8/1/2018). Introducing [facility] & HEALTH SERVICES! Dear Lamont meyer: Thank you for requesting a [employer] account. Our records indicate that you already have an active [employer] account. You can access your account anytime at [email or web address] Events Did you know that you can access your hospital and ER discharge instructions at any time in [employer]?   You can also review all of your test results from your hospital stay or ER visit. Additional Information If you have questions, please visit the Frequently Asked Questions section of the [employer] website at [email or web address] Remember, [employer] is NOT to be used for urgent needs. For medical emergencies, dial 911. Now available from your iPhone and Android! Please provide this summary of care documentation to your next provider. Your primary care clinician is listed as CATHY Ruffin. If you have any questions after today's visit, please call [phone].

## 2018-06-06 NOTE — LETTER
NOTIFICATION RETURN TO WORK / SCHOOL 
 
6/6/2018 Ms. Marcela Chávez [address] [facility] [address] To Whom It May Concern: 
 
Marcela Chávez is currently under the care of CARDIO SPECIALIST AT [address] [place] [address]. She will return to work/school on: June 12, 2018 with no restrictions. If there are questions or concerns please have the patient contact our office. Sincerely, Candelaria Westfall MD

## 2018-06-06 NOTE — PROGRESS NOTES
1. Have you been to the ER, urgent care clinic since your last visit? Hospitalized since your last visit? No    2. Have you seen or consulted any other health care providers outside of the [address] [place] [address] since your last visit? Include any pap smears or colon screening.  No

## 2018-06-13 ENCOUNTER — CLINICAL SUPPORT (OUTPATIENT)
Dept: CARDIOLOGY CLINIC | Age: 63
End: 2018-06-13

## 2018-06-13 DIAGNOSIS — I50.23 ACUTE ON CHRONIC SYSTOLIC CONGESTIVE HEART FAILURE (HCC): ICD-10-CM

## 2018-06-13 DIAGNOSIS — I48.3 TYPICAL ATRIAL FLUTTER (HCC): Primary | ICD-10-CM

## 2018-06-13 DIAGNOSIS — Z95.810 AICD (AUTOMATIC CARDIOVERTER/DEFIBRILLATOR) PRESENT: ICD-10-CM

## 2018-06-14 RX ORDER — CARVEDILOL 6.25 MG/1
TABLET ORAL
Qty: 60 TAB | Refills: 0 | Status: SHIPPED | OUTPATIENT
Start: 2018-06-14 | End: 2018-07-16 | Stop reason: SDUPTHER

## 2018-06-14 RX ORDER — BUMETANIDE 1 MG/1
TABLET ORAL
Qty: 30 TAB | Refills: 0 | Status: SHIPPED | OUTPATIENT
Start: 2018-06-14 | End: 2018-07-16 | Stop reason: SDUPTHER

## 2018-06-22 NOTE — PROGRESS NOTES
Subjective:      Rodney Mcguire is in the office today for cardiac reevaluation. She is a 39-year-old woman with hypertension, history of atrial flutter with symptomatic bradycardia, and prior atrial flutter ablation, as well as permanent pacemaker implantation in March of 2015. She was admitted to [facility] in 06/2017 with increasing shortness of breath and chest pressure. She was felt to have acute on chronic systolic failure. Her failure resolved fairly quickly with intravenous diuretics with a normalization of her chest x-ray, as well as her BNP. In January and early February of 2018 she began to experience more SOB and was once again  found to be in atrial fibrillation. She was evaluated by Dr Danielito Flood and there were plans for a possible second ablative attempt. JINA demonstrated possible clot in the LA appendage. Since her EF remained at 30% for > 3 monts on appropriate therapy, her pacemaker was upgraded to a BiV AICD. In the office today, she reports that she feels good. She walked with her Fitbit for 22 minutes recently. She would like to RT to work. Patient Active Problem List    Diagnosis Date Noted    S/P biventricular cardiac pacemaker procedure 05/14/2018    AICD (automatic cardioverter/defibrillator) present 05/14/2018    JOSÉ MIGUEL (obstructive sleep apnea) 08/14/2017    Acute on chronic systolic congestive heart failure ([facility] [address] 07/20/2017    Bronchiolitis 07/20/2017    Chest pain 07/19/2017    Hypertension     Dyslipidemia     Atrial flutter       Current Outpatient Prescriptions   Medication Sig Dispense Refill    carvedilol (COREG) 6.25 mg tablet TAKE ONE TABLET BY MOUTH 2 TIMES A DAY WITH MEALS 60 Tab 0    bumetanide (BUMEX) 1 mg tablet TAKE ONE TABLET BY MOUTH DAILY 30 Tab 0    oxyCODONE-acetaminophen (PERCOCET) 5-325 mg per tablet Take 1 Tab by mouth every four (4) hours as needed. Max Daily Amount: 6 Tabs.  15 Tab 0    apixaban (ELIQUIS) 5 mg tablet Take 1 Tab by mouth two (2) times a day. 60 Tab 6    losartan (COZAAR) 25 mg tablet take 1 tablet by mouth once daily  0    atorvastatin (LIPITOR) 10 mg tablet   0    buPROPion XL (WELLBUTRIN XL) 150 mg tablet Take 150 mg by mouth every morning.  CALCIUM PO Take 1,200 mg by mouth daily.  multivitamin, stress formula (STRESS TAB) tablet Take 1 Tab by mouth daily.  cholecalciferol (VITAMIN D3) 1,000 unit tablet Take  by mouth daily.  omega-3 fatty acids-vitamin e (FISH OIL) 1,000 mg cap Take 1 Cap by mouth.  Desvenlafaxine SR (PRISTIQ) 50 mg tablet Take 50 mg by mouth daily. Indications: GENERALIZED ANXIETY DISORDER       No Known Allergies  Past Medical History:   Diagnosis Date    Atrial flutter     CHADS score 1  (-CHF, +HTN, -AGE, -DM, -CVA)    Atrial flutter ablation     3/13    Cardiomyopathy ([address]     LVEF 30% (03/17)    Dyslipidemia     Hypertension     Obstructive sleep apnea     CPAP    Pacemaker     3/13 (MEDTRONIC)     Past Surgical History:   Procedure Laterality Date    HX HYSTERECTOMY      ?  years ago    HX OOPHORECTOMY       Family History   Problem Relation Age of Onset    Breast Cancer Sister 59     History   Smoking Status    Never Smoker   Smokeless Tobacco    Never Used          Review of Systems, additional:  Constitutional: negative  Eyes: negative  Respiratory: negative  Cardiovascular: negative  Gastrointestinal: negative  Musculoskeletal:negative  Neurological: negative  Behvioral/Psych: negative  Endocrine: negative  ENT: negative    Objective:     Visit Vitals    /77    Pulse 92    Ht 5' 2\" (1.575 m)    Wt 158 lb (71.7 kg)    SpO2 97%    BMI 28.9 kg/m2     General:  alert, cooperative, no distress   Chest Wall: inspection normal - no chest wall deformities or tenderness, respiratory effort normal   Lung: clear to auscultation bilaterally   Heart:  normal rate and regular rhythm, no murmurs noted, no gallops noted   Abdomen: soft, non-tender. Bowel sounds normal. No masses,  no organomegaly   Extremities: extremities normal, atraumatic, no cyanosis or edema Skin: no rashes   Neuro: alert, oriented, normal speech, no focal findings or movement disorder noted         Assessment/Plan:       ICD-10-CM ICD-9-CM    1. Acute on chronic systolic congestive heart failure ([facility] [address]  Episode in 06/2017 with hospitalization at [facility]. I50.23 428.23      428.0    2. Typical atrial flutter ([facility] [address] S/P ablation 03/2013 with return of atrial dysrhythmia. CHADS score 1. Plan was to repeat Echo which demonstrated an EF of 35% and normal LA size. Second ablative procedure considered but patient had atrial appendage clot and worsened LV function with EF 30%. BiV AICD placed 4/24/2018. Progressing. Permission given to RT to work. I48.3 427.32    3. Hypertension, well controlled in office today. I11.9 402.10    4. Biventricular AICD   in situ, Medtronic 04/24/2018      5. Chest pain, unspecified type R07.9 786.50    6.  Dyslipidemia E78.5 272.4    7. JOSÉ MIGUEL (obstructive sleep apnea) G47.33 327.23

## 2018-07-18 RX ORDER — CARVEDILOL 6.25 MG/1
TABLET ORAL
Qty: 60 TAB | Refills: 0 | Status: SHIPPED | OUTPATIENT
Start: 2018-07-18 | End: 2018-08-22 | Stop reason: SDUPTHER

## 2018-07-18 RX ORDER — BUMETANIDE 1 MG/1
TABLET ORAL
Qty: 30 TAB | Refills: 0 | Status: SHIPPED | OUTPATIENT
Start: 2018-07-18 | End: 2018-08-14 | Stop reason: SDUPTHER

## 2018-08-14 RX ORDER — BUMETANIDE 1 MG/1
TABLET ORAL
Qty: 30 TAB | Refills: 0 | Status: SHIPPED | OUTPATIENT
Start: 2018-08-14 | End: 2018-09-10 | Stop reason: SDUPTHER

## 2018-08-24 RX ORDER — CARVEDILOL 6.25 MG/1
TABLET ORAL
Qty: 60 TAB | Refills: 0 | Status: SHIPPED | OUTPATIENT
Start: 2018-08-24 | End: 2018-09-25 | Stop reason: SDUPTHER

## 2018-08-27 ENCOUNTER — OFFICE VISIT (OUTPATIENT)
Dept: CARDIOLOGY CLINIC | Age: 63
End: 2018-08-27

## 2018-08-27 VITALS
DIASTOLIC BLOOD PRESSURE: 68 MMHG | SYSTOLIC BLOOD PRESSURE: 115 MMHG | OXYGEN SATURATION: 97 % | WEIGHT: 152 LBS | HEIGHT: 62 IN | BODY MASS INDEX: 27.97 KG/M2 | HEART RATE: 88 BPM

## 2018-08-27 DIAGNOSIS — I11.9 BENIGN HYPERTENSIVE HEART DISEASE WITHOUT HEART FAILURE: ICD-10-CM

## 2018-08-27 DIAGNOSIS — G47.33 OSA (OBSTRUCTIVE SLEEP APNEA): ICD-10-CM

## 2018-08-27 DIAGNOSIS — I48.3 TYPICAL ATRIAL FLUTTER (HCC): Primary | ICD-10-CM

## 2018-08-27 DIAGNOSIS — Z95.810 AICD (AUTOMATIC CARDIOVERTER/DEFIBRILLATOR) PRESENT: ICD-10-CM

## 2018-08-27 DIAGNOSIS — Z95.0 S/P BIVENTRICULAR CARDIAC PACEMAKER PROCEDURE: ICD-10-CM

## 2018-08-27 DIAGNOSIS — E78.5 DYSLIPIDEMIA: ICD-10-CM

## 2018-08-27 NOTE — PROGRESS NOTES
1. Have you been to the ER, urgent care clinic since your last visit? Hospitalized since your last visit? No    2. Have you seen or consulted any other health care providers outside of the [address] [place] [address] since your last visit? Include any pap smears or colon screening.  No

## 2018-08-27 NOTE — MR AVS SNAPSHOT
[address] [facility] 
 
 
 [address] [address] 
[phone] Patient: Sammy Gutierrez MRN: [id number] EXE:84/79/[id number] Visit Information Date & Time Provider Department Dept. Phone Encounter #  
 8/27/2018 10:15 AM Jacque Spivey MD [address] [place] [address] Specialist at [facility] [phone] [id number] Follow-up Instructions Return in about 4 months (around 12/17/2018). Routing History Your Appointments 9/12/2018  9:15 AM  
PROCEDURE with Ruiz Schroeder Csi Cardio Specialist at [facility] Appt Note: 3 month in office device check [address]  
  
   
 [id number] [place], [place] [address] [place] [address]  
  
    
 12/17/2018  9:00 AM  
Follow Up with Jacque Spivey MD  
Cardio Specialist at [facility] Appt Note: 4 mth fu  
 [address]  
  
   
 [address] [facility] [address] Upcoming Health Maintenance Date Due Hepatitis C Screening 1955 Pneumococcal 19-64 Medium Risk (1 of 1 - PPSV23) 12/25/1972 DTaP/Tdap/Td series (1 - Tdap) 12/25/1974 FOBT Q 1 YEAR AGE 50-75 12/25/2003 ZOSTER VACCINE AGE 60> 10/25/2013 PAP AKA CERVICAL CYTOLOGY 3/21/2015 Influenza Age 5 to Adult 8/1/2018 BREAST CANCER SCRN MAMMOGRAM 12/29/2019 Allergies as of 8/27/2018  Review Complete On: 8/27/2018 By: Mi Cosby RN No Known Allergies Current Immunizations  Never Reviewed No immunizations on file. Not reviewed this visit Vitals BP Pulse Height(growth percentile) Weight(growth percentile) SpO2 BMI  
 115/68 88 5' 2\" (1.575 m) 152 lb (68.9 kg) 97% 27.8 kg/m2 OB Status Smoking Status Hysterectomy Never Smoker BMI and BSA Data  Body Mass Index Body Surface Area  
 27.8 kg/m 2 1.74 m 2  
 Preferred Pharmacy Pharmacy Name Phone [facility] [address], [address] [phone] Your Updated Medication List  
  
   
This list is accurate as of 8/27/18 10:42 AM.  Always use your most recent med list.  
  
  
  
  
 apixaban 5 mg tablet Commonly known as:  Temo Faisal Take 1 Tab by mouth two (2) times a day. atorvastatin 10 mg tablet Commonly known as:  LIPITOR  
  
 bumetanide 1 mg tablet Commonly known as:  Jim Pilar TAKE ONE TABLET BY MOUTH DAILY CALCIUM PO Take 1,200 mg by mouth daily. carvedilol 6.25 mg tablet Commonly known as:  COREG  
TAKE ONE TABLET BY MOUTH 2 TIMES A DAY WITH MEALS FISH OIL 1,000 mg Cap Generic drug:  omega-3 fatty acids-vitamin e Take 1 Cap by mouth.  
  
 losartan 25 mg tablet Commonly known as:  COZAAR  
take 1 tablet by mouth once daily  
  
 multivitamin, stress formula tablet Commonly known as:  STRESS TAB Take 1 Tab by mouth daily. oxyCODONE-acetaminophen 5-325 mg per tablet Commonly known as:  PERCOCET Take 1 Tab by mouth every four (4) hours as needed. Max Daily Amount: 6 Tabs. PRISTIQ 50 mg ER tablet Generic drug:  desvenlafaxine succinate Take 50 mg by mouth daily. Indications: GENERALIZED ANXIETY DISORDER  
  
 VITAMIN D3 1,000 unit tablet Generic drug:  cholecalciferol Take  by mouth daily. WELLBUTRIN  mg tablet Generic drug:  buPROPion XL Take 150 mg by mouth every morning. Follow-up Instructions Return in about 4 months (around 12/17/2018). Introducing [facility] & HEALTH SERVICES! Dear Annika Munoz: Thank you for requesting a [employer] account. Our records indicate that you already have an active [employer] account. You can access your account anytime at [email or web address] Did you know that you can access your hospital and ER discharge instructions at any time in [employer]?   You can also review all of your test results from your hospital stay or ER visit. Additional Information If you have questions, please visit the Frequently Asked Questions section of the [employer] website at [email or web address] Remember, [employer] is NOT to be used for urgent needs. For medical emergencies, dial 911. Now available from your iPhone and Android! Please provide this summary of care documentation to your next provider. Your primary care clinician is listed as CATHY Galindo. If you have any questions after today's visit, please call [phone].

## 2018-09-05 NOTE — TELEPHONE ENCOUNTER
Last appt: 8/27/2018  Future Appointments  Date Time Provider Alyssa Jini   9/12/2018 9:15 AM Ruiz Schroeder erica [facility]   12/17/2018 9:00 AM Gloria Freedman MD [address] [place] [address]       Requested Prescriptions     Pending Prescriptions Disp Refills    ELIQUIS 5 mg tablet [Pharmacy Med Name: ELIQUIS 5 MG TABLET] 60 Tab 0     Sig: TAKE 1 TAB BY MOUTH TWICE A DAY.

## 2018-09-06 RX ORDER — APIXABAN 5 MG/1
TABLET, FILM COATED ORAL
Qty: 60 TAB | Refills: 0 | Status: SHIPPED | OUTPATIENT
Start: 2018-09-06 | End: 2018-10-02 | Stop reason: SDUPTHER

## 2018-09-07 NOTE — PROGRESS NOTES
Subjective:      Juan C Hernandez is in the office today for cardiac reevaluation. She is a 73-year-old woman with hypertension, history of atrial flutter with symptomatic bradycardia, and prior atrial flutter ablation, as well as permanent pacemaker implantation in March of 2015. She was admitted to [facility] in 06/2017 with increasing shortness of breath and chest pressure. She was felt to have acute on chronic systolic failure. Her failure resolved fairly quickly with intravenous diuretics with a normalization of her chest x-ray, as well as her BNP. In January and early February of 2018 she began to experience more SOB and was once again  found to be in atrial fibrillation. She was evaluated by Dr Erin Stephenson and there were plans for a possible second ablative attempt. JINA demonstrated possible clot in the LA appendage. Since her EF remained at 30% for > 3 months on appropriate therapy, her pacemaker was upgraded to a BiV AICD. In the office today, she reports that she reports that she has been doing well. She recently vacationed in [place], [place] and walked for long periods of time. She related that the walking did not bother her. She has had no palpitations or dizziness. She has had no peripheral swelling.                 Patient Active Problem List    Diagnosis Date Noted    S/P biventricular cardiac pacemaker procedure 05/14/2018    AICD (automatic cardioverter/defibrillator) present 05/14/2018    JOSÉ MIGUEL (obstructive sleep apnea) 08/14/2017    Acute on chronic systolic congestive heart failure ([facility] [address] 07/20/2017    Bronchiolitis 07/20/2017    Chest pain 07/19/2017    Hypertension     Dyslipidemia     Atrial flutter       Current Outpatient Prescriptions   Medication Sig Dispense Refill    carvedilol (COREG) 6.25 mg tablet TAKE ONE TABLET BY MOUTH 2 TIMES A DAY WITH MEALS 60 Tab 0    bumetanide (BUMEX) 1 mg tablet TAKE ONE TABLET BY MOUTH DAILY 30 Tab 0    oxyCODONE-acetaminophen (PERCOCET) 5-325 mg per tablet Take 1 Tab by mouth every four (4) hours as needed. Max Daily Amount: 6 Tabs. 15 Tab 0    losartan (COZAAR) 25 mg tablet take 1 tablet by mouth once daily  0    atorvastatin (LIPITOR) 10 mg tablet   0    buPROPion XL (WELLBUTRIN XL) 150 mg tablet Take 150 mg by mouth every morning.  CALCIUM PO Take 1,200 mg by mouth daily.  multivitamin, stress formula (STRESS TAB) tablet Take 1 Tab by mouth daily.  cholecalciferol (VITAMIN D3) 1,000 unit tablet Take  by mouth daily.  omega-3 fatty acids-vitamin e (FISH OIL) 1,000 mg cap Take 1 Cap by mouth.  Desvenlafaxine SR (PRISTIQ) 50 mg tablet Take 50 mg by mouth daily. Indications: GENERALIZED ANXIETY DISORDER      ELIQUIS 5 mg tablet TAKE 1 TAB BY MOUTH TWICE A DAY. 61 Tab 0     No Known Allergies  Past Medical History:   Diagnosis Date    Atrial flutter     CHADS score 1  (-CHF, +HTN, -AGE, -DM, -CVA)    Atrial flutter ablation     3/13    Cardiomyopathy ([address]     LVEF 30% (03/17)    Dyslipidemia     Hypertension     Obstructive sleep apnea     CPAP    Pacemaker     3/13 ([employer]TRONIC)     Past Surgical History:   Procedure Laterality Date    HX HYSTERECTOMY      ?  years ago    HX OOPHORECTOMY       Family History   Problem Relation Age of Onset    Breast Cancer Sister 59     History   Smoking Status    Never Smoker   Smokeless Tobacco    Never Used          Review of Systems, additional:  Constitutional: negative  Eyes: negative  Respiratory: negative  Cardiovascular: negative  Gastrointestinal: negative  Musculoskeletal:negative  Neurological: negative  Behvioral/Psych: negative  Endocrine: negative  ENT: negative    Objective:     Visit Vitals    /68    Pulse 88    Ht 5' 2\" (1.575 m)    Wt 152 lb (68.9 kg)    SpO2 97%    BMI 27.8 kg/m2     General:  alert, cooperative, no distress   Chest Wall: inspection normal - no chest wall deformities or tenderness, respiratory effort normal   Lung: clear to auscultation bilaterally   Heart:  normal rate and regular rhythm, no murmurs noted, no gallops noted   Abdomen: soft, non-tender. Bowel sounds normal. No masses,  no organomegaly   Extremities: extremities normal, atraumatic, no cyanosis or edema Skin: no rashes   Neuro: alert, oriented, normal speech, no focal findings or movement disorder noted         Assessment/Plan:       ICD-10-CM ICD-9-CM    1. Acute on chronic systolic congestive heart failure ([facility] [address]  Episode in 06/2017 with hospitalization at [facility]. I50.23 428.23      428.0    2. Typical atrial flutter ([facility] [address] S/P ablation 03/2013 with return of atrial dysrhythmia. CHADS score 1. Plan was to repeat Echo which demonstrated an EF of 35% and normal LA size. Second ablative procedure considered but patient had atrial appendage clot and worsened LV function with EF 30%. BiV AICD placed 4/24/2018. Stable. RT 4 mos. I48.3 427.32    3. Hypertension, well controlled in office today. I11.9 402.10    4. Biventricular AICD   in situ, [employer] 04/24/2018      5. Chest pain, unspecified type R07.9 786.50    6.  Dyslipidemia E78.5 272.4    7. JOSÉ MIGUEL (obstructive sleep apnea) G47.33 327.23

## 2018-09-10 NOTE — TELEPHONE ENCOUNTER
Last appt: 8/27/2018  Future Appointments  Date Time Provider Alyssa Nicholas   9/12/2018 9:15 AM Ruiz Schroeder [facility] [address] [facility]   12/17/2018 9:00 AM Anna Jin  [facility]       Requested Prescriptions     Pending Prescriptions Disp Refills    bumetanide (BUMEX) 1 mg tablet [Pharmacy Med Name: BUMETANIDE 1 MG TABLET] 30 Tab 6     Sig: TAKE ONE TABLET BY MOUTH DAILY

## 2018-09-11 RX ORDER — BUMETANIDE 1 MG/1
TABLET ORAL
Qty: 30 TAB | Refills: 6 | Status: SHIPPED | OUTPATIENT
Start: 2018-09-11 | End: 2019-04-08 | Stop reason: SDUPTHER

## 2018-09-12 ENCOUNTER — CLINICAL SUPPORT (OUTPATIENT)
Dept: CARDIOLOGY CLINIC | Age: 63
End: 2018-09-12

## 2018-09-12 DIAGNOSIS — I50.23 ACUTE ON CHRONIC SYSTOLIC CONGESTIVE HEART FAILURE (HCC): ICD-10-CM

## 2018-09-12 DIAGNOSIS — I48.3 TYPICAL ATRIAL FLUTTER (HCC): Primary | ICD-10-CM

## 2018-09-12 DIAGNOSIS — Z95.810 AICD (AUTOMATIC CARDIOVERTER/DEFIBRILLATOR) PRESENT: ICD-10-CM

## 2018-09-26 RX ORDER — CARVEDILOL 6.25 MG/1
TABLET ORAL
Qty: 60 TAB | Refills: 0 | Status: SHIPPED | OUTPATIENT
Start: 2018-09-26 | End: 2018-10-29 | Stop reason: SDUPTHER

## 2018-09-26 NOTE — TELEPHONE ENCOUNTER
PCP: Lucero Newman MD    Last appt: 9/12/2018  Future Appointments  Date Time Provider Alyssa Nicholas   12/12/2018 3:20 PM Pacer [address]   12/17/2018 9:00 AM Ashley Quispe MD [address] [place] [address]       Requested Prescriptions     Pending Prescriptions Disp Refills    carvedilol (COREG) 6.25 mg tablet [Pharmacy Med Name: CARVEDILOL 6.25 MG TABLET] 60 Tab 0     Sig: TAKE ONE TABLET BY MOUTH 2 TIMES A DAY WITH MEALS

## 2018-10-02 RX ORDER — APIXABAN 5 MG/1
TABLET, FILM COATED ORAL
Qty: 60 TAB | Refills: 0 | Status: SHIPPED | OUTPATIENT
Start: 2018-10-02 | End: 2018-11-05 | Stop reason: SDUPTHER

## 2018-10-29 NOTE — TELEPHONE ENCOUNTER
PCP: Venu Bernard MD    Last appt: 9/12/2018  Future Appointments   Date Time Provider Alyssa Nicholas   12/12/2018  3:20 PM Cserica, Pacer [address]   12/17/2018  9:00 AM Alexa Morse MD [address] [place] [address]       Requested Prescriptions     Pending Prescriptions Disp Refills    carvedilol (COREG) 6.25 mg tablet [Pharmacy Med Name: CARVEDILOL 6.25 MG TABLET] 60 Tab 6     Sig: TAKE ONE TABLET BY MOUTH 2 TIMES A DAY WITH MEALS

## 2018-10-30 RX ORDER — CARVEDILOL 6.25 MG/1
TABLET ORAL
Qty: 60 TAB | Refills: 6 | Status: SHIPPED | OUTPATIENT
Start: 2018-10-30 | End: 2019-06-13 | Stop reason: SDUPTHER

## 2018-11-06 RX ORDER — APIXABAN 5 MG/1
TABLET, FILM COATED ORAL
Qty: 60 TAB | Refills: 0 | Status: SHIPPED | OUTPATIENT
Start: 2018-11-06 | End: 2019-12-30

## 2018-12-12 ENCOUNTER — OFFICE VISIT (OUTPATIENT)
Dept: CARDIOLOGY CLINIC | Age: 63
End: 2018-12-12

## 2018-12-12 DIAGNOSIS — I48.3 TYPICAL ATRIAL FLUTTER (HCC): ICD-10-CM

## 2018-12-12 DIAGNOSIS — Z95.810 AICD (AUTOMATIC CARDIOVERTER/DEFIBRILLATOR) PRESENT: ICD-10-CM

## 2018-12-12 DIAGNOSIS — I42.9 CARDIOMYOPATHY, UNSPECIFIED TYPE (HCC): Primary | ICD-10-CM

## 2018-12-17 ENCOUNTER — OFFICE VISIT (OUTPATIENT)
Dept: CARDIOLOGY CLINIC | Age: 63
End: 2018-12-17

## 2018-12-17 VITALS
WEIGHT: 152 LBS | OXYGEN SATURATION: 96 % | HEART RATE: 94 BPM | HEIGHT: 62 IN | BODY MASS INDEX: 27.97 KG/M2 | SYSTOLIC BLOOD PRESSURE: 122 MMHG | DIASTOLIC BLOOD PRESSURE: 71 MMHG

## 2018-12-17 DIAGNOSIS — E78.5 DYSLIPIDEMIA: ICD-10-CM

## 2018-12-17 DIAGNOSIS — I11.9 BENIGN HYPERTENSIVE HEART DISEASE WITHOUT HEART FAILURE: ICD-10-CM

## 2018-12-17 DIAGNOSIS — Z95.810 AICD (AUTOMATIC CARDIOVERTER/DEFIBRILLATOR) PRESENT: ICD-10-CM

## 2018-12-17 DIAGNOSIS — J21.9 BRONCHIOLITIS: ICD-10-CM

## 2018-12-17 DIAGNOSIS — Z95.0 S/P BIVENTRICULAR CARDIAC PACEMAKER PROCEDURE: ICD-10-CM

## 2018-12-17 DIAGNOSIS — G47.33 OSA (OBSTRUCTIVE SLEEP APNEA): ICD-10-CM

## 2018-12-17 DIAGNOSIS — I50.23 ACUTE ON CHRONIC SYSTOLIC CONGESTIVE HEART FAILURE (HCC): Primary | ICD-10-CM

## 2018-12-17 DIAGNOSIS — I48.3 TYPICAL ATRIAL FLUTTER (HCC): ICD-10-CM

## 2018-12-17 NOTE — PROGRESS NOTES
1. Have you been to the ER, urgent care clinic since your last visit? Hospitalized since your last visit? No    2. Have you seen or consulted any other health care providers outside of the [address] [place] [address] since your last visit? Include any pap smears or colon screening.  No

## 2018-12-26 NOTE — PROGRESS NOTES
I have personally seen and evaluated the device findings. Interrogation reviewed and I agree with assessment.     Chico Larose

## 2018-12-30 NOTE — PROGRESS NOTES
Subjective:      Roque Lantigua is in the office today for cardiac reevaluation. She is a 24-year-old woman with hypertension, history of atrial flutter with symptomatic bradycardia, and prior atrial flutter ablation, as well as permanent pacemaker implantation in March of 2015. She was admitted to [facility] in 06/2017 with increasing shortness of breath and chest pressure. She was felt to have acute on chronic systolic failure. Her failure resolved fairly quickly with intravenous diuretics with a normalization of her chest x-ray, as well as her BNP. In January and early February of 2018 she began to experience more SOB and was once again was found to be in atrial fibrillation. She was evaluated by Dr Melyssa Bronson and there were plans for a possible second ablative attempt. However, JINA demonstrated a possible clot in the LA appendage. Since her EF remained at 30% for > 3 months on appropriate therapy, her pacemaker was upgraded to a BiV AICD. In the office today, she reports she feels good. She has had no chest pain. Her breathing is improved with a recent change in her inhaler. She has had no palpitations. She has had some minor swelling in her ankles. Patient Active Problem List    Diagnosis Date Noted    S/P biventricular cardiac pacemaker procedure 05/14/2018    AICD (automatic cardioverter/defibrillator) present 05/14/2018    JOSÉ MIGUEL (obstructive sleep apnea) 08/14/2017    Acute on chronic systolic congestive heart failure ([facility] [address] 07/20/2017    Bronchiolitis 07/20/2017    Chest pain 07/19/2017    Hypertension     Dyslipidemia     Atrial flutter       Current Outpatient Medications   Medication Sig Dispense Refill    ELIQUIS 5 mg tablet TAKE 1 TAB BY MOUTH TWICE A DAY.  60 Tab 0    carvedilol (COREG) 6.25 mg tablet TAKE ONE TABLET BY MOUTH 2 TIMES A DAY WITH MEALS 60 Tab 6    bumetanide (BUMEX) 1 mg tablet TAKE ONE TABLET BY MOUTH DAILY 30 Tab 6    oxyCODONE-acetaminophen (PERCOCET) 5-325 mg per tablet Take 1 Tab by mouth every four (4) hours as needed. Max Daily Amount: 6 Tabs. 15 Tab 0    losartan (COZAAR) 25 mg tablet take 1 tablet by mouth once daily  0    atorvastatin (LIPITOR) 10 mg tablet   0    buPROPion XL (WELLBUTRIN XL) 150 mg tablet Take 150 mg by mouth every morning.  CALCIUM PO Take 1,200 mg by mouth daily.  multivitamin, stress formula (STRESS TAB) tablet Take 1 Tab by mouth daily.  cholecalciferol (VITAMIN D3) 1,000 unit tablet Take  by mouth daily.  omega-3 fatty acids-vitamin e (FISH OIL) 1,000 mg cap Take 1 Cap by mouth.  Desvenlafaxine SR (PRISTIQ) 50 mg tablet Take 50 mg by mouth daily. Indications: GENERALIZED ANXIETY DISORDER       No Known Allergies  Past Medical History:   Diagnosis Date    Atrial flutter     CHADS score 1  (-CHF, +HTN, -AGE, -DM, -CVA)    Atrial flutter ablation     3/13    Cardiomyopathy ([facility] [address]     LVEF 30% (03/17)    Dyslipidemia     Hypertension     Obstructive sleep apnea     CPAP    Pacemaker     3/13 ([employer])     Past Surgical History:   Procedure Laterality Date    HX HYSTERECTOMY      ?  years ago    HX OOPHORECTOMY       Family History   Problem Relation Age of Onset    Breast Cancer Sister 59     Social History     Tobacco Use   Smoking Status Never Smoker   Smokeless Tobacco Never Used          Review of Systems, additional:  Constitutional: negative  Eyes: negative  Respiratory: negative  Cardiovascular: negative  Gastrointestinal: negative  Musculoskeletal:negative  Neurological: negative  Behvioral/Psych: negative  Endocrine: negative  ENT: negative    Objective:     Visit Vitals  /71   Pulse 94   Ht 5' 2\" (1.575 m)   Wt 152 lb (68.9 kg)   SpO2 96%   BMI 27.80 kg/m²     General:  alert, cooperative, no distress   Chest Wall: inspection normal - no chest wall deformities or tenderness, respiratory effort normal   Lung: clear to auscultation bilaterally   Heart:  normal rate and regular rhythm, no murmurs noted, no gallops noted   Abdomen: soft, non-tender. Bowel sounds normal. No masses,  no organomegaly   Extremities: extremities normal, atraumatic, no cyanosis or edema Skin: no rashes   Neuro: alert, oriented, normal speech, no focal findings or movement disorder noted         Assessment/Plan:       ICD-10-CM ICD-9-CM    1. Acute on chronic systolic congestive heart failure ([facility] [address]  Episode in 06/2017 with hospitalization at [facility]. I50.23 428.23      428.0    2. Typical atrial flutter ([facility] [address] S/P ablation 03/2013 with return of atrial dysrhythmia. CHADS score 1. Plan was to repeat Echo which demonstrated an EF of 35% and normal LA size. Second ablative procedure considered but patient had atrial appendage clot and worsened LV function with EF 30%. BiV AICD placed 4/24/2018. Continued stable course. RT 4 mos. I48.3 427.32    3. Hypertension, well controlled in office today. I11.9 402.10    4. Biventricular AICD   in situ, [employer] 04/24/2018      5. Chest pain, unspecified type, none recently R07.9 786.50    6.  Dyslipidemia E78.5 272.4    7. JOSÉ MIGUEL (obstructive sleep apnea) G47.33 327.23

## 2018-12-31 ENCOUNTER — HOSPITAL ENCOUNTER (OUTPATIENT)
Dept: MAMMOGRAPHY | Age: 63
Discharge: HOME | End: 2018-12-31
Attending: FAMILY MEDICINE
Payer: COMMERCIAL

## 2018-12-31 DIAGNOSIS — Z12.31 VISIT FOR SCREENING MAMMOGRAM: ICD-10-CM

## 2018-12-31 PROCEDURE — 77063 BREAST TOMOSYNTHESIS BI: CPT

## 2019-03-13 ENCOUNTER — OFFICE VISIT (OUTPATIENT)
Dept: CARDIOLOGY CLINIC | Age: 64
End: 2019-03-13

## 2019-03-13 DIAGNOSIS — Z95.0 S/P BIVENTRICULAR CARDIAC PACEMAKER PROCEDURE: ICD-10-CM

## 2019-03-13 DIAGNOSIS — I48.3 TYPICAL ATRIAL FLUTTER (HCC): Primary | ICD-10-CM

## 2019-03-15 NOTE — PROGRESS NOTES
I have personally seen and evaluated the device findings. Interrogation reviewed and I agree with assessment.     Adriana Musa

## 2019-04-10 RX ORDER — BUMETANIDE 1 MG/1
TABLET ORAL
Qty: 30 TAB | Refills: 0 | Status: SHIPPED | OUTPATIENT
Start: 2019-04-10 | End: 2019-05-08 | Stop reason: SDUPTHER

## 2019-04-24 ENCOUNTER — OFFICE VISIT (OUTPATIENT)
Dept: CARDIOLOGY CLINIC | Age: 64
End: 2019-04-24

## 2019-04-24 VITALS
OXYGEN SATURATION: 95 % | BODY MASS INDEX: 28.34 KG/M2 | WEIGHT: 154 LBS | HEIGHT: 62 IN | SYSTOLIC BLOOD PRESSURE: 120 MMHG | DIASTOLIC BLOOD PRESSURE: 71 MMHG | HEART RATE: 99 BPM

## 2019-04-24 DIAGNOSIS — R07.9 CHEST PAIN, UNSPECIFIED TYPE: ICD-10-CM

## 2019-04-24 DIAGNOSIS — I50.23 ACUTE ON CHRONIC SYSTOLIC CONGESTIVE HEART FAILURE (HCC): Primary | ICD-10-CM

## 2019-04-24 DIAGNOSIS — E78.5 DYSLIPIDEMIA: ICD-10-CM

## 2019-04-24 DIAGNOSIS — Z95.0 S/P BIVENTRICULAR CARDIAC PACEMAKER PROCEDURE: ICD-10-CM

## 2019-04-24 DIAGNOSIS — I11.9 BENIGN HYPERTENSIVE HEART DISEASE WITHOUT HEART FAILURE: ICD-10-CM

## 2019-04-24 DIAGNOSIS — I48.3 TYPICAL ATRIAL FLUTTER (HCC): ICD-10-CM

## 2019-04-24 DIAGNOSIS — Z95.810 AICD (AUTOMATIC CARDIOVERTER/DEFIBRILLATOR) PRESENT: ICD-10-CM

## 2019-04-24 NOTE — PROGRESS NOTES
1. Have you been to the ER, urgent care clinic since your last visit? Hospitalized since your last visit? no 
 
2. Have you seen or consulted any other health care providers outside of the [address] [place] [address] since your last visit? Include any pap smears or colon screening.  no

## 2019-04-28 NOTE — PROGRESS NOTES
Subjective:  
   Stuart Hensley is in the office today for cardiac reevaluation. She is a 52-year-old woman with hypertension, history of atrial flutter with symptomatic bradycardia, and prior atrial flutter ablation, as well as permanent pacemaker implantation in March of 2015. She was admitted to [facility] in 06/2017 with increasing shortness of breath and chest pressure. She was felt to have acute on chronic systolic failure. Her failure resolved fairly quickly with intravenous diuretics with a normalization of her chest x-ray, as well as her BNP. In January and early February of 2018 she began to experience more SOB and was once again was found to be in atrial fibrillation. She was evaluated by Dr Mendez Doan and there were plans for a possible second ablative attempt. However, JINA demonstrated a possible clot in the LA appendage. Since her EF remained at 30% for > 3 months on appropriate therapy, her pacemaker was upgraded to a BiV AICD. In the office today, she reports that she is doing well. Her breathing has been good which she attributes to the Symbicort. She has had no chest pain. She has had some minimal swelling in her lower extremities. She is very cognizant of her salt intake. Patient Active Problem List  
 Diagnosis Date Noted  S/P biventricular cardiac pacemaker procedure 05/14/2018 Priority: 1 - One  
 AICD (automatic cardioverter/defibrillator) present 05/14/2018  JOSÉ MIGUEL (obstructive sleep apnea) 08/14/2017  Acute on chronic systolic congestive heart failure ([facility] [address] 07/20/2017  Bronchiolitis 07/20/2017  Chest pain 07/19/2017  Hypertension  Dyslipidemia  Atrial flutter Current Outpatient Medications Medication Sig Dispense Refill  bumetanide (BUMEX) 1 mg tablet TAKE ONE TABLET BY MOUTH DAILY 30 Tab 0  
 ELIQUIS 5 mg tablet TAKE 1 TAB BY MOUTH TWICE A DAY.  60 Tab 0  
  carvedilol (COREG) 6.25 mg tablet TAKE ONE TABLET BY MOUTH 2 TIMES A DAY WITH MEALS 60 Tab 6  
 losartan (COZAAR) 25 mg tablet take 1 tablet by mouth once daily  0  
 atorvastatin (LIPITOR) 10 mg tablet   0  
 buPROPion XL (WELLBUTRIN XL) 150 mg tablet Take 150 mg by mouth every morning.  CALCIUM PO Take 1,200 mg by mouth daily.  multivitamin, stress formula (STRESS TAB) tablet Take 1 Tab by mouth daily.  cholecalciferol (VITAMIN D3) 1,000 unit tablet Take  by mouth daily.  omega-3 fatty acids-vitamin e (FISH OIL) 1,000 mg cap Take 1 Cap by mouth.  Desvenlafaxine SR (PRISTIQ) 50 mg tablet Take 50 mg by mouth daily. Indications: GENERALIZED ANXIETY DISORDER No Known Allergies Past Medical History:  
Diagnosis Date  Atrial flutter CHADS score 1  (-CHF, +HTN, -AGE, -DM, -CVA)  Atrial flutter ablation 3/13  Cardiomyopathy ([facility] [address] LVEF 30% (03/17)  Dyslipidemia  Hypertension  Obstructive sleep apnea CPAP  
 Pacemaker 3/13 ([employer]) Past Surgical History:  
Procedure Laterality Date  HX HYSTERECTOMY ? years ago  HX OOPHORECTOMY Family History Problem Relation Age of Onset  Breast Cancer Sister 59 Social History Tobacco Use Smoking Status Never Smoker Smokeless Tobacco Never Used Review of Systems, additional: 
Constitutional: negative Eyes: negative Respiratory: negative Cardiovascular: negative Gastrointestinal: negative Musculoskeletal:negative Neurological: negative Behvioral/Psych: negative Endocrine: negative ENT: negative Objective:  
 
Visit Vitals /71 Pulse 99 Ht 5' 2\" (1.575 m) Wt 154 lb (69.9 kg) SpO2 95% BMI 28.17 kg/m² General:  alert, cooperative, no distress Chest Wall: inspection normal - no chest wall deformities or tenderness, respiratory effort normal  
Lung: clear to auscultation bilaterally Heart:  normal rate and regular rhythm, no murmurs noted, no gallops noted Abdomen: soft, non-tender. Bowel sounds normal. No masses,  no organomegaly Extremities: extremities normal, atraumatic, no cyanosis or edema Skin: no rashes Neuro: alert, oriented, normal speech, no focal findings or movement disorder noted Assessment/Plan: ICD-10-CM ICD-9-CM 1. Acute on chronic systolic congestive heart failure ([facility] [address]  Episode in 06/2017 with hospitalization at [facility]. I50.23 428.23   
  428.0 2. Typical atrial flutter ([facility] [address] S/P ablation 03/2013 with return of atrial dysrhythmia. CHADS score 1. Plan was to repeat Echo which demonstrated an EF of 35% and normal LA size. Second ablative procedure considered but patient had atrial appendage clot and worsened LV function with EF 30%. BiV AICD placed 4/24/2018. Stable. Return in 4 months  I48.3 427.32   
3. Hypertension, well controlled in office today. I11.9 402.10   
4. Biventricular AICD   in situ, [employer] 04/24/2018 5. Chest pain, unspecified type, none recently R07.9 786.50   
6.  Dyslipidemia E78.5 272.4   
7. JOSÉ MIGUEL (obstructive sleep apnea) G47.33 327.23

## 2019-05-08 RX ORDER — BUMETANIDE 1 MG/1
TABLET ORAL
Qty: 30 TAB | Refills: 0 | Status: SHIPPED | OUTPATIENT
Start: 2019-05-08 | End: 2019-06-17 | Stop reason: SDUPTHER

## 2019-06-14 RX ORDER — CARVEDILOL 6.25 MG/1
TABLET ORAL
Qty: 60 TAB | Refills: 0 | Status: SHIPPED | OUTPATIENT
Start: 2019-06-14 | End: 2019-07-17 | Stop reason: SDUPTHER

## 2019-06-19 ENCOUNTER — OFFICE VISIT (OUTPATIENT)
Dept: CARDIOLOGY CLINIC | Age: 64
End: 2019-06-19

## 2019-06-19 DIAGNOSIS — Z95.0 S/P BIVENTRICULAR CARDIAC PACEMAKER PROCEDURE: ICD-10-CM

## 2019-06-19 DIAGNOSIS — I42.9 CARDIOMYOPATHY, UNSPECIFIED TYPE (HCC): Primary | ICD-10-CM

## 2019-06-19 RX ORDER — BUMETANIDE 1 MG/1
TABLET ORAL
Qty: 30 TAB | Refills: 0 | Status: SHIPPED | OUTPATIENT
Start: 2019-06-19 | End: 2019-07-17 | Stop reason: SDUPTHER

## 2019-07-17 RX ORDER — BUMETANIDE 1 MG/1
TABLET ORAL
Qty: 30 TAB | Refills: 6 | Status: SHIPPED | OUTPATIENT
Start: 2019-07-17 | End: 2020-03-16

## 2019-07-17 RX ORDER — CARVEDILOL 6.25 MG/1
TABLET ORAL
Qty: 60 TAB | Refills: 6 | Status: SHIPPED | OUTPATIENT
Start: 2019-07-17 | End: 2020-03-09

## 2019-07-17 NOTE — TELEPHONE ENCOUNTER
PCP: Sylwia Shepard MD    Last appt: 4/24/2019  Future Appointments   Date Time Provider Alyssa Nicholas   8/26/2019  9:15 AM Navya Finley  [facility]   10/9/2019  9:15 AM CSI, PACER [facility] [address] [facility]       Requested Prescriptions     Pending Prescriptions Disp Refills    carvedilol (COREG) 6.25 mg tablet [Pharmacy Med Name: CARVEDILOL 6.25 MG TABLET] 60 Tab 6     Sig: TAKE ONE TABLET BY MOUTH 2 TIMES A DAY WITH MEALS    bumetanide (BUMEX) 1 mg tablet [Pharmacy Med Name: BUMETANIDE 1 MG TABLET] 30 Tab 6     Sig: TAKE ONE TABLET BY MOUTH DAILY

## 2019-09-20 ENCOUNTER — OFFICE VISIT (OUTPATIENT)
Dept: CARDIOLOGY CLINIC | Age: 64
End: 2019-09-20

## 2019-09-20 VITALS
OXYGEN SATURATION: 97 % | HEIGHT: 62 IN | DIASTOLIC BLOOD PRESSURE: 78 MMHG | BODY MASS INDEX: 28.71 KG/M2 | WEIGHT: 156 LBS | HEART RATE: 81 BPM | SYSTOLIC BLOOD PRESSURE: 129 MMHG

## 2019-09-20 DIAGNOSIS — E78.5 DYSLIPIDEMIA: ICD-10-CM

## 2019-09-20 DIAGNOSIS — I50.9 CONGESTIVE HEART FAILURE, UNSPECIFIED HF CHRONICITY, UNSPECIFIED HEART FAILURE TYPE (HCC): Primary | ICD-10-CM

## 2019-09-20 NOTE — PROGRESS NOTES
1. Have you been to the ER, urgent care clinic since your last visit? Hospitalized since your last visit? No    2. Have you seen or consulted any other health care providers outside of the [address] [place] [address] since your last visit? Include any pap smears or colon screening.  No

## 2019-09-20 NOTE — PATIENT INSTRUCTIONS
Montse will call to schedule your testing within 48 hours.      If you do not hear from her, then please call central scheduling at [phone] or [phone] Kemar Chavez)    All testing/lab work is completed at [facility] , [facility]  No appointment required for lab work  Hours are Mon-Fri 7:00 am-5:30 pm

## 2019-09-24 ENCOUNTER — HOSPITAL ENCOUNTER (OUTPATIENT)
Dept: LAB | Age: 64
Discharge: HOME | End: 2019-09-24

## 2019-09-24 LAB — SENTARA SPECIMEN COL,SENBCF: NORMAL

## 2019-09-24 PROCEDURE — 99001 SPECIMEN HANDLING PT-LAB: CPT

## 2019-09-25 LAB
CHOLEST SERPL-MCNC: 167 MG/DL (ref 110–200)
HDLC SERPL-MCNC: 3.3 MG/DL (ref 0–5)
HDLC SERPL-MCNC: 51 MG/DL (ref 40–59)
LDLC SERPL CALC-MCNC: 100 MG/DL (ref 50–99)
TRIGL SERPL-MCNC: 76 MG/DL (ref 40–149)
VLDLC SERPL CALC-MCNC: 15 MG/DL (ref 8–30)

## 2019-09-27 NOTE — PROGRESS NOTES
Subjective:      Harinder Mccarty is in the office today for cardiac reevaluation. She is a 43-year-old woman with hypertension, history of atrial flutter with symptomatic bradycardia, and prior atrial flutter ablation, as well as permanent pacemaker implantation in March of 2015. She was admitted to [facility] in 06/2017 with increasing shortness of breath and chest pressure. She was felt to have acute on chronic systolic failure. Her failure resolved fairly quickly with intravenous diuretics with a normalization of her chest x-ray, as well as her BNP. In January and early February of 2018, she began to experience more SOB and was once again  found to be in atrial fibrillation. She was evaluated by Dr Magan Dillon and there were plans for a possible second ablative attempt. However, JINA demonstrated a possible clot in the LA appendage. Since her EF remained at 30% for > 3 months on appropriate therapy, her pacemaker was upgraded to a BiV AICD. In the office on 4/24/2019, she reported that she was doing well. Her breathing had been good which she attributed to the Symbicort. She was not having chest pain. She  had some minimal swelling in her lower extremities. She continued to be very cognizant of her salt intake. In the office today she says she feels \"good \". Her weight is up 2 pounds. She has had no peripheral swelling. She has not had a lipid profile done in some time.       Patient Active Problem List    Diagnosis Date Noted    S/P biventricular cardiac pacemaker procedure 05/14/2018     Priority: 1 - One    AICD (automatic cardioverter/defibrillator) present 05/14/2018    JOSÉ MIGUEL (obstructive sleep apnea) 08/14/2017    Acute on chronic systolic congestive heart failure ([facility] [address] 07/20/2017    Bronchiolitis 07/20/2017    Chest pain 07/19/2017    Hypertension     Dyslipidemia     Atrial flutter       Current Outpatient Medications   Medication Sig Dispense Refill    carvedilol (COREG) 6.25 mg tablet TAKE ONE TABLET BY MOUTH 2 TIMES A DAY WITH MEALS 60 Tab 6    bumetanide (BUMEX) 1 mg tablet TAKE ONE TABLET BY MOUTH DAILY 30 Tab 6    ELIQUIS 5 mg tablet TAKE 1 TAB BY MOUTH TWICE A DAY. 60 Tab 0    losartan (COZAAR) 25 mg tablet take 1 tablet by mouth once daily  0    atorvastatin (LIPITOR) 10 mg tablet   0    buPROPion XL (WELLBUTRIN XL) 150 mg tablet Take 150 mg by mouth every morning.  CALCIUM PO Take 1,200 mg by mouth daily.  multivitamin, stress formula (STRESS TAB) tablet Take 1 Tab by mouth daily.  cholecalciferol (VITAMIN D3) 1,000 unit tablet Take  by mouth daily.  omega-3 fatty acids-vitamin e (FISH OIL) 1,000 mg cap Take 1 Cap by mouth.  Desvenlafaxine SR (PRISTIQ) 50 mg tablet Take 50 mg by mouth daily. Indications: GENERALIZED ANXIETY DISORDER       No Known Allergies  Past Medical History:   Diagnosis Date    Atrial flutter     CHADS score 1  (-CHF, +HTN, -AGE, -DM, -CVA)    Atrial flutter ablation     3/13    Cardiomyopathy ([facility] [address]     LVEF 30% (03/17)    Dyslipidemia     Hypertension     Obstructive sleep apnea     CPAP    Pacemaker     3/13 (MEDTRONIC)     Past Surgical History:   Procedure Laterality Date    HX HYSTERECTOMY      ?  years ago    HX OOPHORECTOMY       Family History   Problem Relation Age of Onset    Breast Cancer Sister 59     Social History     Tobacco Use   Smoking Status Never Smoker   Smokeless Tobacco Never Used          Review of Systems, additional:  Constitutional: negative  Eyes: negative  Respiratory: negative  Cardiovascular: negative  Gastrointestinal: negative  Musculoskeletal:negative  Neurological: negative  Behvioral/Psych: negative  Endocrine: negative  ENT: negative    Objective:     Visit Vitals  /78   Pulse 81   Ht 5' 2\" (1.575 m)   Wt 70.8 kg (156 lb)   SpO2 97%   BMI 28.53 kg/m²     General:  alert, cooperative, no distress   Chest Wall: inspection normal - no chest wall deformities or tenderness, respiratory effort normal   Lung: clear to auscultation bilaterally   Heart:  normal rate and regular rhythm, no murmurs noted, no gallops noted   Abdomen: soft, non-tender. Bowel sounds normal. No masses,  no organomegaly   Extremities: extremities normal, atraumatic, no cyanosis or edema Skin: no rashes   Neuro: alert, oriented, normal speech, no focal findings or movement disorder noted         Assessment/Plan:       ICD-10-CM ICD-9-CM    1. Acute on chronic systolic congestive heart failure ([facility] [address]  Episode in 06/2017 with hospitalization at [facility]. I50.23 428.23      428.0    2. Typical atrial flutter ([facility] [address] S/P ablation 03/2013 with return of atrial dysrhythmia. CHADS score 1. Plan was to repeat Echo which demonstrated an EF of 35% and normal LA size. Second ablative procedure considered but patient had atrial appendage clot and worsened LV function with EF 30%. BiV AICD placed 4/24/2018. Continues with stable course. Return in 4 months. We will repeat echo prior to visit. I48.3 427.32    3. Hypertension, well controlled in office today. I11.9 402.10    4. Biventricular AICD   in situ, Medtronic 04/24/2018      5. Chest pain, unspecified type, none recently R07.9 786.50    6. Dyslipidemia, will order fasting lipid profile.  E78.5 272.4    7. JOSÉ MIGUEL (obstructive sleep apnea) G47.33 327.23

## 2019-10-09 ENCOUNTER — CLINICAL SUPPORT (OUTPATIENT)
Dept: CARDIOLOGY CLINIC | Age: 64
End: 2019-10-09

## 2019-10-09 DIAGNOSIS — I50.9 CONGESTIVE HEART FAILURE, UNSPECIFIED HF CHRONICITY, UNSPECIFIED HEART FAILURE TYPE (HCC): Primary | ICD-10-CM

## 2019-10-09 DIAGNOSIS — Z95.810 AICD (AUTOMATIC CARDIOVERTER/DEFIBRILLATOR) PRESENT: ICD-10-CM

## 2019-10-09 DIAGNOSIS — I48.0 PAROXYSMAL ATRIAL FIBRILLATION (HCC): ICD-10-CM

## 2019-10-09 DIAGNOSIS — Z95.0 S/P BIVENTRICULAR CARDIAC PACEMAKER PROCEDURE: ICD-10-CM

## 2019-12-11 ENCOUNTER — HOSPITAL ENCOUNTER (OUTPATIENT)
Dept: CT IMAGING | Age: 64
Discharge: HOME | End: 2019-12-11
Attending: INTERNAL MEDICINE
Payer: COMMERCIAL

## 2019-12-11 DIAGNOSIS — J47.9 BRONCHIECTASIS WITHOUT COMPLICATION (HCC): ICD-10-CM

## 2019-12-11 PROCEDURE — 71250 CT THORAX DX C-: CPT

## 2019-12-27 ENCOUNTER — HOSPITAL ENCOUNTER (OUTPATIENT)
Dept: NON INVASIVE DIAGNOSTICS | Age: 64
Discharge: HOME | End: 2019-12-27
Attending: INTERNAL MEDICINE
Payer: COMMERCIAL

## 2019-12-27 VITALS
BODY MASS INDEX: 28.71 KG/M2 | DIASTOLIC BLOOD PRESSURE: 78 MMHG | HEIGHT: 62 IN | WEIGHT: 156 LBS | SYSTOLIC BLOOD PRESSURE: 129 MMHG

## 2019-12-27 DIAGNOSIS — I50.9 CONGESTIVE HEART FAILURE, UNSPECIFIED HF CHRONICITY, UNSPECIFIED HEART FAILURE TYPE (HCC): ICD-10-CM

## 2019-12-27 LAB
ECHO AO ASC DIAM: 2.79 CM
ECHO AO ROOT DIAM: 2.95 CM
ECHO EST RA PRESSURE: 3 MMHG
ECHO IVC SNIFF: 1.32 CM
ECHO LA MAJOR AXIS: 3.58 CM
ECHO LA TO AORTIC ROOT RATIO: 1.21
ECHO LV EDV A4C: 96.3 ML
ECHO LV EDV INDEX A4C: 56 ML/M2
ECHO LV EDV TEICHHOLZ: 0.63 ML
ECHO LV ESV A4C: 49.9 ML
ECHO LV ESV INDEX A4C: 29 ML/M2
ECHO LV ESV TEICHHOLZ: 0.36 ML
ECHO LV INTERNAL DIMENSION DIASTOLIC: 4.65 CM (ref 3.9–5.3)
ECHO LV INTERNAL DIMENSION SYSTOLIC: 3.67 CM
ECHO LV IVSD: 0.84 CM (ref 0.6–0.9)
ECHO LV MASS 2D: 147.7 G (ref 67–162)
ECHO LV MASS INDEX 2D: 85.9 G/M2 (ref 43–95)
ECHO LV POSTERIOR WALL DIASTOLIC: 0.86 CM (ref 0.6–0.9)
ECHO LVOT DIAM: 1.92 CM
ECHO LVOT PEAK GRADIENT: 2.4 MMHG
ECHO LVOT PEAK VELOCITY: 77.32 CM/S
ECHO LVOT SV: 46 ML
ECHO LVOT VTI: 15.97 CM
ECHO MV A VELOCITY: 55.81 CM/S
ECHO MV E DECELERATION TIME (DT): 170.3 MS
ECHO MV E VELOCITY: 103.86 CM/S
ECHO MV E/A RATIO: 1.86
ECHO PULMONARY ARTERY SYSTOLIC PRESSURE (PASP): 20.8 MMHG
ECHO RIGHT VENTRICULAR SYSTOLIC PRESSURE (RVSP): 20.8 MMHG
ECHO TV REGURGITANT MAX VELOCITY: 211.05 CM/S
ECHO TV REGURGITANT PEAK GRADIENT: 17.8 MMHG
LVFS 2D: 21.12 %
LVOT MG: 1 MMHG
LVOT MV: 0.44 CM/S
LVSV (MOD SINGLE 4C): 26.32 ML
LVSV (TEICH): 24.38 ML
MV DEC SLOPE: 6.1

## 2019-12-27 PROCEDURE — 93306 TTE W/DOPPLER COMPLETE: CPT

## 2019-12-30 RX ORDER — APIXABAN 5 MG/1
TABLET, FILM COATED ORAL
Qty: 180 TAB | Refills: 3 | Status: SHIPPED | OUTPATIENT
Start: 2019-12-30 | End: 2021-01-15

## 2020-01-03 ENCOUNTER — HOSPITAL ENCOUNTER (OUTPATIENT)
Dept: MAMMOGRAPHY | Age: 65
Discharge: HOME | End: 2020-01-03
Attending: FAMILY MEDICINE
Payer: COMMERCIAL

## 2020-01-03 DIAGNOSIS — Z12.31 ENCOUNTER FOR SCREENING MAMMOGRAM FOR BREAST CANCER: ICD-10-CM

## 2020-01-03 PROCEDURE — 77063 BREAST TOMOSYNTHESIS BI: CPT

## 2020-01-15 ENCOUNTER — OFFICE VISIT (OUTPATIENT)
Dept: CARDIOLOGY CLINIC | Age: 65
End: 2020-01-15

## 2020-01-15 VITALS
WEIGHT: 158 LBS | DIASTOLIC BLOOD PRESSURE: 69 MMHG | BODY MASS INDEX: 28.9 KG/M2 | OXYGEN SATURATION: 97 % | SYSTOLIC BLOOD PRESSURE: 116 MMHG | HEART RATE: 92 BPM

## 2020-01-15 DIAGNOSIS — I49.8 ATRIAL DYSRHYTHMIA: Primary | ICD-10-CM

## 2020-01-15 RX ORDER — ATORVASTATIN CALCIUM 20 MG/1
20 TABLET, FILM COATED ORAL DAILY
Qty: 30 TAB | Refills: 5 | Status: SHIPPED | OUTPATIENT
Start: 2020-01-15 | End: 2020-02-25 | Stop reason: SDUPTHER

## 2020-01-15 RX ORDER — ATORVASTATIN CALCIUM 20 MG/1
TABLET, FILM COATED ORAL DAILY
COMMUNITY
End: 2020-01-15 | Stop reason: SDUPTHER

## 2020-01-15 NOTE — PROGRESS NOTES
1. Have you been to the ER, urgent care clinic since your last visit? Hospitalized since your last visit? No     2. Have you seen or consulted any other health care providers outside of the [address] [place] [address] since your last visit? Include any pap smears or colon screening.  no

## 2020-01-18 NOTE — PROGRESS NOTES
Subjective:      Lady Butler is in the office today for cardiac reevaluation. She is a 49-year-old woman with hypertension, history of atrial flutter with symptomatic bradycardia, and prior atrial flutter ablation, as well as permanent pacemaker implantation in March of 2015. She was admitted to [facility] in 06/2017 with increasing shortness of breath and chest pressure. She was felt to have acute on chronic systolic failure. Her failure resolved fairly quickly with intravenous diuretics with a normalization of her chest x-ray, as well as her BNP. In January and early February of 2018, she began to experience more SOB and was once again  found to be in atrial fibrillation. She was evaluated by Dr Babs Radford and there were plans for a possible second ablative attempt. However, JINA demonstrated a possible clot in the LA appendage. Since her EF remained at 30% for > 3 months on appropriate therapy, her pacemaker was upgraded to a BiV AICD. In the office on 4/24/2019, she reported that she was doing well. Her breathing had been good which she attributed to the Symbicort. She was not having chest pain. She  had some minimal swelling in her lower extremities. She continued to be very cognizant of her salt intake. In the office today she says she is doing well. She has had no chest pain. She has had some uptick  in her sputum production recently. High resolution CT done in November 2019 demonstrated mild central bronchiectasis.   Her most recent LDL was 100 on 9/24/2019      Patient Active Problem List    Diagnosis Date Noted    S/P biventricular cardiac pacemaker procedure 05/14/2018     Priority: 1 - One    AICD (automatic cardioverter/defibrillator) present 05/14/2018    JOSÉ MIGUEL (obstructive sleep apnea) 08/14/2017    Acute on chronic systolic congestive heart failure ([address] 07/20/2017    Bronchiolitis 07/20/2017    Chest pain 07/19/2017    Hypertension     Dyslipidemia     Atrial flutter       Current Outpatient Medications   Medication Sig Dispense Refill    ELIQUIS 5 mg tablet TAKE 1 TAB BY MOUTH TWICE A DAY. 180 Tab 3    carvedilol (COREG) 6.25 mg tablet TAKE ONE TABLET BY MOUTH 2 TIMES A DAY WITH MEALS 60 Tab 6    bumetanide (BUMEX) 1 mg tablet TAKE ONE TABLET BY MOUTH DAILY 30 Tab 6    losartan (COZAAR) 25 mg tablet take 1 tablet by mouth once daily  0    buPROPion XL (WELLBUTRIN XL) 150 mg tablet Take 150 mg by mouth every morning.  multivitamin, stress formula (STRESS TAB) tablet Take 1 Tab by mouth daily.  cholecalciferol (VITAMIN D3) 1,000 unit tablet Take  by mouth daily.  omega-3 fatty acids-vitamin e (FISH OIL) 1,000 mg cap Take 1 Cap by mouth.  Desvenlafaxine SR (PRISTIQ) 50 mg tablet Take 50 mg by mouth daily. Indications: GENERALIZED ANXIETY DISORDER      atorvastatin (LIPITOR) 20 mg tablet Take 1 Tab by mouth daily. 30 Tab 5     No Known Allergies  Past Medical History:   Diagnosis Date    Atrial flutter     CHADS score 1  (-CHF, +HTN, -AGE, -DM, -CVA)    Atrial flutter ablation     3/13    Cardiomyopathy ([address]     LVEF 30% (03/17)    Dyslipidemia     Hypertension     Obstructive sleep apnea     CPAP    Pacemaker     3/13 ([employer]TR[employer])     Past Surgical History:   Procedure Laterality Date    HX HYSTERECTOMY      ?  years ago    HX OOPHORECTOMY       Family History   Problem Relation Age of Onset    Breast Cancer Sister 59     Social History     Tobacco Use   Smoking Status Never Smoker   Smokeless Tobacco Never Used          Review of Systems, additional:  Constitutional: negative  Eyes: negative  Respiratory: negative  Cardiovascular: negative  Gastrointestinal: negative  Musculoskeletal:negative  Neurological: negative  Behvioral/Psych: negative  Endocrine: negative  ENT: negative    Objective:     Visit Vitals  /69   Pulse 92   Wt 71.7 kg (158 lb)   SpO2 97%   BMI 28.90 kg/m²     General:  alert, cooperative, no distress Chest Wall: inspection normal - no chest wall deformities or tenderness, respiratory effort normal   Lung: clear to auscultation bilaterally   Heart:  normal rate and regular rhythm, no murmurs noted, no gallops noted   Abdomen: soft, non-tender. Bowel sounds normal. No masses,  no organomegaly   Extremities: extremities normal, atraumatic, no cyanosis or edema Skin: no rashes   Neuro: alert, oriented, normal speech, no focal findings or movement disorder noted         Assessment/Plan:       ICD-10-CM ICD-9-CM    1. Acute on chronic systolic congestive heart failure ([facility] [address]  Episode in 06/2017 with hospitalization at [facility]. I50.23 428.23      428.0    2. Typical atrial flutter ([address] S/P ablation 03/2013 with return of atrial dysrhythmia. CHADS score 1. Plan was to repeat Echo which demonstrated an EF of 35% and normal LA size. Second ablative procedure considered but patient had atrial appendage clot and worsened LV function with EF 30%. BiV AICD placed 4/24/2018. Continues with stable course. Echo repeated on 12/20/2019. EF is 45 to 50%. There is grade 1 diastolic dysfunction. Return in 6 months. I48.3 427.32    3. Hypertension, well controlled in office today. I11.9 402.10    4. Biventricular AICD   in situ, Medtr[employer] 04/24/2018      5. Chest pain, unspecified type, none recently R07.9 786.50    6. Dyslipidemia LDL is 100 on 9/24/2019. Increase Lipitor to 20 mg daily. E78.5 272.4    7. JOSÉ MIGUEL (obstructive sleep apnea) G47.33 327.23    8. Bronchiectasis, high resolution CT 11/18/2019; mild central bronchiectasis.   Followed by Dr. Mary Guevara

## 2020-02-26 RX ORDER — ATORVASTATIN CALCIUM 20 MG/1
20 TABLET, FILM COATED ORAL DAILY
Qty: 30 TAB | Refills: 5 | Status: SHIPPED | OUTPATIENT
Start: 2020-02-26 | End: 2020-09-21

## 2020-03-09 RX ORDER — CARVEDILOL 6.25 MG/1
TABLET ORAL
Qty: 60 TAB | Refills: 6 | Status: SHIPPED | OUTPATIENT
Start: 2020-03-09 | End: 2020-09-26

## 2020-03-16 RX ORDER — BUMETANIDE 1 MG/1
TABLET ORAL
Qty: 30 TAB | Refills: 6 | Status: SHIPPED | OUTPATIENT
Start: 2020-03-16 | End: 2020-11-22

## 2020-04-08 ENCOUNTER — OFFICE VISIT (OUTPATIENT)
Dept: CARDIOLOGY CLINIC | Age: 65
End: 2020-04-08

## 2020-04-08 DIAGNOSIS — I42.9 CARDIOMYOPATHY, UNSPECIFIED TYPE (HCC): Primary | ICD-10-CM

## 2020-04-08 DIAGNOSIS — Z95.810 AICD (AUTOMATIC CARDIOVERTER/DEFIBRILLATOR) PRESENT: ICD-10-CM

## 2020-08-19 ENCOUNTER — OFFICE VISIT (OUTPATIENT)
Dept: CARDIOLOGY CLINIC | Age: 65
End: 2020-08-19

## 2020-08-19 DIAGNOSIS — Z95.810 AICD (AUTOMATIC CARDIOVERTER/DEFIBRILLATOR) PRESENT: Primary | ICD-10-CM

## 2020-08-19 DIAGNOSIS — I42.9 CARDIOMYOPATHY, UNSPECIFIED TYPE (HCC): ICD-10-CM

## 2020-08-26 ENCOUNTER — OFFICE VISIT (OUTPATIENT)
Dept: CARDIOLOGY CLINIC | Age: 65
End: 2020-08-26

## 2020-08-26 VITALS
BODY MASS INDEX: 29.44 KG/M2 | HEIGHT: 62 IN | SYSTOLIC BLOOD PRESSURE: 117 MMHG | OXYGEN SATURATION: 96 % | TEMPERATURE: 98.7 F | DIASTOLIC BLOOD PRESSURE: 73 MMHG | HEART RATE: 88 BPM | WEIGHT: 160 LBS

## 2020-08-26 DIAGNOSIS — E78.5 DYSLIPIDEMIA: Primary | ICD-10-CM

## 2020-08-26 NOTE — PROGRESS NOTES
Pradeep Collazo presents today for   Chief Complaint   Patient presents with   [address] preferred language for health care discussion is english/other. Is someone accompanying this pt? no    Is the patient using any DME equipment during [address]? no    Depression Screening:  3 most recent PHQ Screens 8/26/2020   Little interest or pleasure in doing things Not at all   Feeling down, depressed, irritable, or hopeless Not at all   Total Score PHQ 2 0       Learning Assessment:  Learning Assessment 6/20/2016   PRIMARY LEARNER Patient   PRIMARY LANGUAGE ENGLISH   LEARNER PREFERENCE PRIMARY OTHER (COMMENT)   ANSWERED BY Patient   RELATIONSHIP SELF       Abuse Screening:  No flowsheet data found. Fall Risk  Fall Risk Assessment, last 12 mths 8/26/2020   Able to walk? Yes   Fall in past 12 months? No       Pt currently taking Anticoagulant therapy? no    Coordination of Care:  1. Have you been to the ER, urgent care clinic since your last visit? Hospitalized since your last visit? no    2. Have you seen or consulted any other health care providers outside of the [address] [place] [address] [place] since your last visit? Include any pap smears or colon screening.  no

## 2020-08-26 NOTE — PATIENT INSTRUCTIONS
Labs: 
Lipid profile No appointment required for lab work Hours are Mon-Fri 7:00 am-5:00 pm 
 
All labs are completed at: 
[facility]  [address] [facility]

## 2020-08-28 NOTE — PROGRESS NOTES
I have personally seen and evaluated the device findings. Interrogation reviewed and I agree with assessment.     Petra Harris

## 2020-08-30 NOTE — PROGRESS NOTES
Subjective:      Prateek Holland is in the office today for cardiac reevaluation. She is a 26-year-old woman with hypertension, history of atrial flutter with symptomatic bradycardia, and prior atrial flutter ablation, as well as permanent pacemaker implantation in March of 2015. She was admitted to [facility] in 06/2017 with increasing shortness of breath and chest pressure. She was felt to have acute on chronic systolic failure. Her failure resolved fairly quickly with intravenous diuretics with a normalization of her chest x-ray, as well as her BNP. In January and early February of 2018, she began to experience more SOB and was once again  found to be in atrial fibrillation. She was evaluated by Dr Naheed Mg and there were plans for a possible second ablative attempt. However, JINA demonstrated a possible clot in the LA appendage. Since her EF remained at 30% for > 3 months on appropriate therapy, her pacemaker was upgraded to a BiV AICD. In the office on 4/24/2019, she reported that she was doing well. Her breathing had been good which she attributed to the Symbicort. She was not having chest pain. She  had some minimal swelling in her lower extremities. She continued to be very cognizant of her salt intake. In the office today she tells me that she retired in June. She is enjoying her detention thus far. She says she feels good  \"as long as I use my inhaler \". She has had no chest pain. She has had no PND or orthopnea. She has had no palpitations, near-syncope or syncope.   Patient Active Problem List    Diagnosis Date Noted    S/P biventricular cardiac pacemaker procedure 05/14/2018     Priority: 1 - One    AICD (automatic cardioverter/defibrillator) present 05/14/2018    JOSÉ MIGUEL (obstructive sleep apnea) 08/14/2017    Acute on chronic systolic congestive heart failure ([address] 07/20/2017    Bronchiolitis 07/20/2017    Chest pain 07/19/2017    Hypertension     Dyslipidemia  Atrial flutter       Current Outpatient Medications   Medication Sig Dispense Refill    bumetanide (BUMEX) 1 mg tablet TAKE ONE TABLET BY MOUTH DAILY 30 Tab 6    carvediloL (COREG) 6.25 mg tablet TAKE ONE TABLET BY MOUTH 2 TIMES A DAY WITH MEALS 60 Tab 6    atorvastatin (LIPITOR) 20 mg tablet Take 1 Tab by mouth daily. 30 Tab 5    ELIQUIS 5 mg tablet TAKE 1 TAB BY MOUTH TWICE A DAY. 180 Tab 3    losartan (COZAAR) 25 mg tablet take 1 tablet by mouth once daily  0    buPROPion XL (WELLBUTRIN XL) 150 mg tablet Take 150 mg by mouth every morning.  multivitamin, stress formula (STRESS TAB) tablet Take 1 Tab by mouth daily.  cholecalciferol (VITAMIN D3) 1,000 unit tablet Take  by mouth daily.  omega-3 fatty acids-vitamin e (FISH OIL) 1,000 mg cap Take 1 Cap by mouth.  Desvenlafaxine SR (PRISTIQ) 50 mg tablet Take 50 mg by mouth daily. Indications: GENERALIZED ANXIETY DISORDER       No Known Allergies  Past Medical History:   Diagnosis Date    Atrial flutter     CHADS score 1  (-CHF, +HTN, -AGE, -DM, -CVA)    Atrial flutter ablation     3/13    Cardiomyopathy ([address]     LVEF 30% (03/17)    Dyslipidemia     Hypertension     Obstructive sleep apnea     CPAP    Pacemaker     3/13 ([employer])     Past Surgical History:   Procedure Laterality Date    HX HYSTERECTOMY      ?  years ago    HX OOPHORECTOMY       Family History   Problem Relation Age of Onset    Breast Cancer Sister 59     Social History     Tobacco Use   Smoking Status Never Smoker   Smokeless Tobacco Never Used          Review of Systems, additional:  Constitutional: negative  Eyes: negative  Respiratory: negative  Cardiovascular: negative  Gastrointestinal: negative  Musculoskeletal:negative  Neurological: negative  Behvioral/Psych: negative  Endocrine: negative  ENT: negative    Objective:     Visit Vitals  /73   Pulse 88   Temp 98.7 °F (37.1 °C) (Temporal)   Ht 5' 2\" (1.575 m)   Wt 160 lb (72.6 kg)   SpO2 96%   BMI 29.26 kg/m²     General:  alert, cooperative, no distress   Chest Wall: inspection normal - no chest wall deformities or tenderness, respiratory effort normal   Lung: clear to auscultation bilaterally   Heart:  normal rate and regular rhythm, no murmurs noted, no gallops noted   Abdomen: soft, non-tender. Bowel sounds normal. No masses,  no organomegaly   Extremities: extremities normal, atraumatic, no cyanosis or edema Skin: no rashes   Neuro: alert, oriented, normal speech, no focal findings or movement disorder noted         Assessment/Plan:       ICD-10-CM ICD-9-CM    1. Acute on chronic systolic congestive heart failure ([facility] [address]  Episode in 06/2017 with hospitalization at [facility]. Stable symptoms since that time I50.23 428.23      428.0    2. Typical atrial flutter ([address] S/P ablation 03/2013 with return of atrial dysrhythmia. CHADS score 1. Plan was to repeat Echo which demonstrated an EF of 35% and normal LA size. Second ablative procedure considered but patient had atrial appendage clot and worsened LV function with EF 30%. BiV AICD placed 4/24/2018. Continues with stable course. Echo repeated on 12/20/2019. EF is 45 to 50%. There is grade 1 diastolic dysfunction. Return in 6 months. I48.3 427.32    3. Hypertension, well controlled in office today. I11.9 402.10    4. Biventricular AICD   in situ, [employer]tr[employer] 04/24/2018      5. Chest pain, unspecified type, none recently R07.9 786.50    6. Dyslipidemia LDL is 100 on 9/24/2019. Increase Lipitor to 20 mg daily. PCP follows E78.5 272.4    7. JOSÉ MIGUEL (obstructive sleep apnea) G47.33 327.23    8. Bronchiectasis, high resolution CT 11/18/2019; mild central bronchiectasis. Followed by Dr. Cadence Huang. Patient reports she feels good as long she uses her inhaler.

## 2020-09-14 NOTE — LETTER
Patient:   BERTRAND GÓMEZ            MRN: [id number]            FIN: [id number]              Age:   61 years     Sex:  MALE     :  55   Associated Diagnoses:   None   Author:   MILDRED LASSITER     Basic Information   History source: Patient.   Medications:  (Selected)   Inpatient Medications  Ordered  Magnesium non-ICU Protocol COMMUNICATION.: COMMUNICATION ORDER, Order Start: 20 9:00:00 CDT  Potassium non-ICU Protocol COMMUNICATION.: COMMUNICATION ORDER, Order Start: 20 9:00:00 CDT  Potassium non-ICU Protocol COMMUNICATION.: COMMUNICATION ORDER, Order Start: 20 9:00:00 CDT  Vitamin C oral 500 mg tablet: 1,000 mg = 2 tab, Oral, BID, Routine, Order Start: 20 22:23:00 CDT, Tab  acetaminophen (Tylenol).: 650 mg = 2 tab, Oral, Q6H, PRN pain mild, Routine, Order Start: 20 22:14:00 CDT, Tab  albuterol HFA inhaler oral 90 mcg/puff (Proventil).: 180 mcg = 2 puff, MDI/DPI, Q6H, PRN shortness of breath or wheezing, Routine, Order Start: 20 0:06:00 CDT, Oral Inhalant, per bdp  dexaMETHasone sodium phosphate injection (Decadron): 6 mg = 1.5 mL, IV Push, Daily, Routine, Order Start: 20 22:35:00 CDT, Injection  dextromethorphan-guaifenesin oral  mg/5 mL liquid (Robitussin DM): 10 mL, Oral, Q6H, PRN cough, Routine, Order Start: 20 22:15:00 CDT, Liquid  docusate sodium (Colace).: 100 mg = 1 cap, Oral, BID, PRN constipation, Routine, Order Start: 20 22:14:00 CDT, Cap  enoxaparin (Lovenox).: 40 mg = 0.4 mL, Subcutaneous, Q Bedtime, Routine, Order Start: 20 22:25:00 CDT, Injection  ketorolac injection (Toradol): 15 mg = 1 mL, IV Push, Q6H, PRN pain moderate, Routine, Order Start: 20 22:32:00 CDT, x 5 days, Order Stop: 20 22:31:00 CDT, Injection  loperamide oral 2 mg capsule (Imodium): 2 mg = 1 cap, Oral, Q4H, PRN diarrhea, Routine, Order Start: 20 22:34:00 CDT, Cap  ondansetron (Zofran).: 4 mg = 2 mL, Slow IV Push, Q8H, PRN nausea,  Patient:  Jesus Alberto Silva YOB: 1955 Date of Visit: 3/23/2018 Dear Ralph Clark., MD 
[address] [place] [address],[address] [place] [id number] VIA Facsimile: [phone] 
 : Thank you for referring Ms. Otto Marie to me for evaluation/treatment. Below are the relevant portions of my assessment and plan of care. Subjective:  
   Otto Marie is in the office today for cardiac reevaluation. She is a 78-year-old woman with hypertension, history of atrial flutter with symptomatic bradycardia, and prior atrial flutter ablation, as well as permanent pacemaker implantation in March of 2015. She was admitted to [facility] in 06/2017 with increasing shortness of breath and chest pressure. She was felt to have acute on chronic systolic failure. Her failure resolved fairly quickly with intravenous diuretics with a normalization of her chest x-ray, as well as her BNP. In January and early February of this year she began to experience more SOB and was found to be in atrial fibrillation. She was evaluated by Dr Shaw Horvath and was sent for an update of her LV systolic function and chamber sizes. In the office today, she says she feels \"okay\". She senses a fluttering under her left breast.  
  
 
 
 
Patient Active Problem List  
 Diagnosis Date Noted  JOSÉ MIGUEL (obstructive sleep apnea) 08/14/2017  Acute on chronic systolic congestive heart failure ([facility] [address] 07/20/2017  Bronchiolitis 07/20/2017  Chest pain 07/19/2017  Cardiac pacemaker in situ 02/25/2016  Hypertension  Dyslipidemia  Atrial flutter Current Outpatient Prescriptions Medication Sig Dispense Refill  apixaban (ELIQUIS) 5 mg tablet Take 1 Tab by mouth two (2) times a day. 60 Tab 6  
 bumetanide (BUMEX) 1 mg tablet Take 1 Tab by mouth daily. 30 Tab 9  carvedilol (COREG) 6.25 mg tablet Take 1 Tab by mouth two (2) times daily (with meals).  60 Tab 9  
 Routine, Order Start: 09/11/20 22:14:00 CDT, Injection  pantoprazole oral 40 mg DR tablet: 40 mg = 1 tab, Oral, Daily, Routine, Order Start: 09/12/20 9:00:00 CDT, Tab DR  remdesivir for COVID-19 EUA.: 100 mg, IVPB, Q Evening, RATE: 250 mL/hr, Infuse over 60 minutes, 250 mL TOTAL Volume, Routine, Order Start: 09/12/20 17:00:00 CDT, x 4 doses, Order Stop: 09/15/20 17:00:00 CDT, MAINTENANCE  thiamine (vitamin B1) oral 100 mg tablet: 100 mg = 1 tab, Oral, Daily, Routine, Order Start: 09/12/20 9:00:00 CDT, Tab  tocilizumab injection 20 mg/mL: 400 mg = 20 mL, IVPB, Once (scheduled), RATE: 100 mL/hr, Infuse over 1 hr, 100 mL TOTAL Volume, Routine, Order Start: 09/14/20 9:00:00 CDT, Order Stop: 09/14/20 9:00:00 CDT  zinc sulfate oral 220 mg capsule: 220 mg = 1 cap, Oral, TID, Routine, Order Start: 09/12/20 9:00:00 CDT, Cap  Documented Medications  Documented  Tylenol: 500 mg, Oral, Q6H, PRN pain moderate, Maintenance  azithromycin oral 250 mg tablet: = 1 packet, Oral, Daily, As directed on package labeling., Tab, # 6 tab, 0 Refills, Maintenance.     History of Present Illness   Pt seen at bedside, in prone position on HFNC 50l at 50%.  Pt feels well and asked how many days he would still be in the hospital.  Informed patient that this virus is a slow healing process and that it will take time for his lungs to heal.  Also let him know that he is still on a lot of oxygen and that we are slowly trying to wean him down, and reiterated the plan of care.  Pt voiced understanding and said that he is just concerned about his wife and children who are also sick at home.  Pt to receive dose of tocilizumab today as well per ID..       Physical Examination               Vital signs   Vital Signs   09/14/20 08:00 CDT Temperature - VS 36.0 deg_C  Normal    Temperature Source - VS Oral    Heart/Pulse Rate 56  Normal    Pulse Source Monitor    Respiration Rate 28 breaths/min  HI    SpO2 94 %  LOW    NIBP Systolic 162  HI    NIBP Diastolic   losartan (COZAAR) 25 mg tablet take 1 tablet by mouth once daily  0  
 atorvastatin (LIPITOR) 10 mg tablet   0  
 buPROPion XL (WELLBUTRIN XL) 150 mg tablet Take 150 mg by mouth every morning.  CALCIUM PO Take 1,200 mg by mouth daily.  multivitamin, stress formula (STRESS TAB) tablet Take 1 Tab by mouth daily.  cholecalciferol (VITAMIN D3) 1,000 unit tablet Take  by mouth daily.  omega-3 fatty acids-vitamin e (FISH OIL) 1,000 mg cap Take 1 Cap by mouth.  Desvenlafaxine SR (PRISTIQ) 50 mg tablet Take 50 mg by mouth daily. Indications: GENERALIZED ANXIETY DISORDER No Known Allergies Past Medical History:  
Diagnosis Date  Atrial flutter CHADS score 1  (-CHF, +HTN, -AGE, -DM, -CVA)  Atrial flutter ablation 3/13  Cardiomyopathy ([facility] [address] LVEF 30% (03/17)  Dyslipidemia  Hypertension  Obstructive sleep apnea CPAP  
 Pacemaker 3/13 ([employer]) Past Surgical History:  
Procedure Laterality Date  HX HYSTERECTOMY ? years ago  HX OOPHORECTOMY Family History Problem Relation Age of Onset  Breast Cancer Sister 59 History Smoking Status  Never Smoker Smokeless Tobacco  
 Never Used Review of Systems, additional: 
Constitutional: negative Eyes: negative Respiratory: negative Cardiovascular: negative Gastrointestinal: negative Musculoskeletal:negative Neurological: negative Behvioral/Psych: negative Endocrine: negative ENT: negative Objective:  
 
Visit Vitals  /71  Pulse 72  Ht 5' 2\" (1.575 m)  Wt 163 lb (73.9 kg)  SpO2 97%  BMI 29.81 kg/m2 General:  alert, cooperative, no distress Chest Wall: inspection normal - no chest wall deformities or tenderness, respiratory effort normal  
Lung: clear to auscultation bilaterally Heart:  normal rate and regular rhythm, no murmurs noted, no gallops noted Abdomen: soft, non-tender.  Bowel sounds normal. No masses,  no 59  LOW    NIBP Source Left Arm    NIBP MAP 97  Normal    SN Alarms Set and Appropriate Patient Alarms Set And Appropriate For Patient  .   General:  Alert, no acute distress.    Skin:  Warm, dry.    Cardiovascular:  Regular rate and rhythm, No murmur, Normal peripheral perfusion.   Respiratory:  Lungs are clear to auscultation, respirations are non-labored, breath sounds are equal.   Gastrointestinal:  Soft, Nontender.    Neurological:  Alert and oriented to person, place, time, and situation.  Eye  Pupils are equal, round and reactive to light.     Medical Decision Making   Results review:    Labs between:  13-SEP-2020 09:10 to 14-SEP-2020 09:10  CBC:                 WBC  HgB  Hct  Plt  MCV  RDW   14-SEP-2020 8.5  13.7  41.7  229  91.2  13.8   DIFF:                 Seg  Neutroph//ABS  Lymph//ABS  Mono//ABS  EOS/ABS  14-SEP-2020 94  // (H) 8.0  // (L) 0.4  // (L) 0.1  // (L) 0.0   BMP:                 Na  Cl  BUN  Glu   14-SEP-2020 140  (H) 109  (H) 21  (H) 153                              K  CO2  Cr  Ca                              4.0  25  0.73  (L) 7.9   CMP:                 AST  ALT  AlkPhos  Bili  Albumin   14-SEP-2020 27  36  60  0.4  (L) 2.4   Other Chem:             Mg  Phos  Triglycerides  GGTP  DirectBili                           (H) 2.5  3.1         COAG:                 INR  PT  PTT  Ddimer  Fibrinogen    14-SEP-2020       (H) 1.36                               I & O between:  13-SEP-2020 09:10 TO 14-SEP-2020 09:10  Med Dosing Weight:  113.5  kg   11-SEP-2020  24 Hour Intake:   704.50  ( 6.21 mL/kg )  24 Hour Output:   500.00           24 Hour Urine/Stool Output:   0.0  24 Hour Balance:   204.50           24 Hour Urine Output:   500.00  ( 0.18 mL/kg/hr )                   Urine Count:  2.00    Stool Count:  1      .   Rationale:    ASSESSMENT & PLAN  Acute hypoxemic respiratory failure 2/2 COVID pneumonia  Started becomeing symptomatic on 9/7  Rapid COVID postive (9/11)  CXR (9/11): There are bilateral  organomegaly Extremities: extremities normal, atraumatic, no cyanosis or edema Skin: no rashes Neuro: alert, oriented, normal speech, no focal findings or movement disorder noted Assessment/Plan: ICD-10-CM ICD-9-CM 1. Acute on chronic systolic congestive heart failure ([facility] [address]  Episode in 06/2017 with hospitalization at [address]. I50.23 428.23   
  428.0 2. Typical atrial flutter ([facility] [address] S/P ablation 03/2013 with return of atrial dysrhythmia. CHADS score 1. Recent EP evaluation. Plan was to repeat Echo which demonstrated an EF of 35% and normal LA size. Patient now to be considered for repeat ablative procedure. I48.3 427.32   
3. Hypertension, well controlled in office today. I11.9 402.10   
4. Cardiac pacemaker in situ, [employer] 03/2013 Z95.0 V45.01   
5. Chest pain, unspecified type R07.9 786.50   
6. Dyslipidemia E78.5 272.4   
7. JOSÉ MIGUEL (obstructive sleep apnea) G47.33 327.23 If you have questions, please do not hesitate to call me. I look forward to following MsJanel Cristal along with you. Sincerely, Renee Shipman MD 
 area of groundglass opacity and focal opacities scattered throughout both lungs concerning for interstitial pneumonia.  s/p 1U convalescent plasma (9/13)   - SpO2 92-97% on 50L HFNC FiO2 70%, wean as able to keep sats > 91%  - WBC WNL and afebrile (9/14)   - Ferritin 699->767 (9/14), d-dimer 1.23->1.36 (9/14), fibrinogen 563 (9/14), CRP 11.0->6.3 (9/14), PCT 0.32 (9/12), ->356 (9/14)  - Continue remdesivir (9/12-9/15)   - Continue Decadron 6mg IVP daily (9/11-TBD)   - Continue Tylenol PRN, albuterol PRN, Robitussin DM PRN, Vit C BID, Zinc sulfate TID  - To recieve tocilizumab x1 today (9/14)   - IL-6, Quantiferon, and Chronic hepatitis panel pending   - Blood cx x2 (9/11): NGTD, pending   - Droplet/contact isolation in place   - Encourage IS use and self-proning as tolerated   - Pulmonology following; Wean O2 as tolerated. Pulmonary hygiene   - ID following; Candidate for toci. Monitor LFTs while on therapy. Trend APRs and monitor oxygenation  Diarrhea likely 2/2 COVID  C. diff negative (9/12)   - Enteric pathogen cx (9/12): negative for Campylobater, pending   - Continue imodium prn     CODE STATUS: Full Code  DVT PPx: Lovenox SQ  DISPOSITION: 60 y/o male with COVID-19 PNA. Pulmonology and ID on consult. On 50L HFNC FiO 50%, wean as tolerated. On remdesivir and decadron. s/p 1U convalescent plasma (9/13). To recieve tocilizumab x1 today (9/14)  PCP: Dr. Carson Castañeda  Charting performed by jovanna Petty for Dr. Eboni Garay  All medical record entries made by the scribe were at my direction. I have reviewed the chart and agree that the record accurately reflects my personal performance of the history, physical exam, hospital course, and assessment and plan.,  All medical record entries made by the scribe were at my direction. I have reviewed the chart and agree that the record accurately reflects my personal performance of the history, physical exam, hospital course, and assessment and plan.  Best  [facility], [facility].  Eboni Garay M.D..

## 2020-09-21 RX ORDER — ATORVASTATIN CALCIUM 20 MG/1
TABLET, FILM COATED ORAL
Qty: 90 TAB | Refills: 3 | Status: SHIPPED | OUTPATIENT
Start: 2020-09-21 | End: 2021-09-01

## 2020-09-26 RX ORDER — CARVEDILOL 6.25 MG/1
TABLET ORAL
Qty: 60 TAB | Refills: 6 | Status: SHIPPED | OUTPATIENT
Start: 2020-09-26 | End: 2021-03-29

## 2020-11-06 ENCOUNTER — TRANSCRIBE ORDER (OUTPATIENT)
Dept: SCHEDULING | Age: 65
End: 2020-11-06

## 2020-11-06 DIAGNOSIS — Z13.820 SPECIAL SCREENING FOR OSTEOPOROSIS: Primary | ICD-10-CM

## 2020-11-06 DIAGNOSIS — M81.0 SENILE OSTEOPOROSIS: ICD-10-CM

## 2020-11-16 ENCOUNTER — HOSPITAL ENCOUNTER (OUTPATIENT)
Dept: GENERAL RADIOLOGY | Age: 65
Discharge: HOME | End: 2020-11-16
Attending: FAMILY MEDICINE
Payer: MEDICARE

## 2020-11-16 DIAGNOSIS — Z13.820 SPECIAL SCREENING FOR OSTEOPOROSIS: ICD-10-CM

## 2020-11-16 DIAGNOSIS — M81.0 AGE RELATED OSTEOPOROSIS: ICD-10-CM

## 2020-11-16 PROCEDURE — 77080 DXA BONE DENSITY AXIAL: CPT

## 2020-11-22 RX ORDER — BUMETANIDE 1 MG/1
TABLET ORAL
Qty: 30 TAB | Refills: 6 | Status: SHIPPED | OUTPATIENT
Start: 2020-11-22 | End: 2021-06-04

## 2020-11-24 ENCOUNTER — TRANSCRIBE ORDER (OUTPATIENT)
Dept: SCHEDULING | Age: 65
End: 2020-11-24

## 2020-11-24 DIAGNOSIS — R91.1 PULMONARY NODULE: Primary | ICD-10-CM

## 2020-12-08 ENCOUNTER — HOSPITAL ENCOUNTER (OUTPATIENT)
Dept: CT IMAGING | Age: 65
Discharge: HOME | End: 2020-12-08
Payer: MEDICARE

## 2020-12-08 DIAGNOSIS — R91.1 PULMONARY NODULE: ICD-10-CM

## 2020-12-08 PROCEDURE — 71250 CT THORAX DX C-: CPT

## 2021-01-04 ENCOUNTER — HOSPITAL ENCOUNTER (OUTPATIENT)
Dept: MAMMOGRAPHY | Age: 66
Discharge: HOME | End: 2021-01-04
Payer: MEDICARE

## 2021-01-04 DIAGNOSIS — Z12.31 VISIT FOR SCREENING MAMMOGRAM: ICD-10-CM

## 2021-01-04 PROCEDURE — 77063 BREAST TOMOSYNTHESIS BI: CPT

## 2021-01-15 RX ORDER — APIXABAN 5 MG/1
TABLET, FILM COATED ORAL
Qty: 180 TAB | Refills: 3 | Status: SHIPPED | OUTPATIENT
Start: 2021-01-15 | End: 2021-12-31

## 2021-01-29 ENCOUNTER — APPOINTMENT (OUTPATIENT)
Dept: GENERAL RADIOLOGY | Age: 66
End: 2021-01-29
Attending: EMERGENCY MEDICINE
Payer: MEDICARE

## 2021-01-29 ENCOUNTER — PATIENT OUTREACH (OUTPATIENT)
Dept: CASE MANAGEMENT | Age: 66
End: 2021-01-29

## 2021-01-29 ENCOUNTER — HOSPITAL ENCOUNTER (EMERGENCY)
Age: 66
Discharge: HOME | End: 2021-01-29
Attending: EMERGENCY MEDICINE
Payer: MEDICARE

## 2021-01-29 VITALS
HEART RATE: 75 BPM | DIASTOLIC BLOOD PRESSURE: 52 MMHG | TEMPERATURE: 101 F | OXYGEN SATURATION: 92 % | SYSTOLIC BLOOD PRESSURE: 97 MMHG | RESPIRATION RATE: 27 BRPM

## 2021-01-29 DIAGNOSIS — E86.0 DEHYDRATION: ICD-10-CM

## 2021-01-29 DIAGNOSIS — R11.0 NAUSEA WITHOUT VOMITING: ICD-10-CM

## 2021-01-29 DIAGNOSIS — U07.1 RESPIRATORY TRACT INFECTION DUE TO COVID-19 VIRUS: Primary | ICD-10-CM

## 2021-01-29 DIAGNOSIS — J98.8 RESPIRATORY TRACT INFECTION DUE TO COVID-19 VIRUS: Primary | ICD-10-CM

## 2021-01-29 LAB
ALBUMIN SERPL-MCNC: 2.8 G/DL (ref 3.4–5)
ALBUMIN/GLOB SERPL: 0.7 {RATIO} (ref 0.8–1.7)
ALP SERPL-CCNC: 64 U/L (ref 45–117)
ALT SERPL-CCNC: 20 U/L (ref 13–56)
ANION GAP SERPL CALC-SCNC: 9 MMOL/L (ref 3–18)
AST SERPL-CCNC: 24 U/L (ref 10–38)
BASOPHILS # BLD: 0 K/UL (ref 0–0.1)
BASOPHILS NFR BLD: 0 % (ref 0–2)
BILIRUB SERPL-MCNC: 0.5 MG/DL (ref 0.2–1)
BNP SERPL-MCNC: 955 PG/ML (ref 0–900)
BUN SERPL-MCNC: 13 MG/DL (ref 7–18)
BUN/CREAT SERPL: 16 (ref 12–20)
CALCIUM SERPL-MCNC: 7.9 MG/DL (ref 8.5–10.1)
CHLORIDE SERPL-SCNC: 101 MMOL/L (ref 100–111)
CO2 SERPL-SCNC: 24 MMOL/L (ref 21–32)
CREAT SERPL-MCNC: 0.8 MG/DL (ref 0.6–1.3)
DIFFERENTIAL METHOD BLD: ABNORMAL
EOSINOPHIL # BLD: 0 K/UL (ref 0–0.4)
EOSINOPHIL NFR BLD: 0 % (ref 0–5)
ERYTHROCYTE [DISTWIDTH] IN BLOOD BY AUTOMATED COUNT: 13 % (ref 11.6–14.5)
GLOBULIN SER CALC-MCNC: 3.8 G/DL (ref 2–4)
GLUCOSE SERPL-MCNC: 119 MG/DL (ref 74–99)
HCT VFR BLD AUTO: 35.8 % (ref 35–45)
HGB BLD-MCNC: 11.9 G/DL (ref 12–16)
LYMPHOCYTES # BLD: 0.8 K/UL (ref 0.9–3.6)
LYMPHOCYTES NFR BLD: 15 % (ref 21–52)
MCH RBC QN AUTO: 31.1 PG (ref 24–34)
MCHC RBC AUTO-ENTMCNC: 33.2 G/DL (ref 31–37)
MCV RBC AUTO: 93.5 FL (ref 74–97)
MONOCYTES # BLD: 0.7 K/UL (ref 0.05–1.2)
MONOCYTES NFR BLD: 14 % (ref 3–10)
NEUTS SEG # BLD: 3.8 K/UL (ref 1.8–8)
NEUTS SEG NFR BLD: 71 % (ref 40–73)
PLATELET # BLD AUTO: 157 K/UL (ref 135–420)
PMV BLD AUTO: 12.2 FL (ref 9.2–11.8)
POTASSIUM SERPL-SCNC: 3.3 MMOL/L (ref 3.5–5.5)
PROT SERPL-MCNC: 6.6 G/DL (ref 6.4–8.2)
RBC # BLD AUTO: 3.83 M/UL (ref 4.2–5.3)
SODIUM SERPL-SCNC: 134 MMOL/L (ref 136–145)
TROPONIN I SERPL-MCNC: 0.04 NG/ML (ref 0–0.04)
WBC # BLD AUTO: 5.4 K/UL (ref 4.6–13.2)

## 2021-01-29 PROCEDURE — 96374 THER/PROPH/DIAG INJ IV PUSH: CPT

## 2021-01-29 PROCEDURE — 99285 EMERGENCY DEPT VISIT HI MDM: CPT

## 2021-01-29 PROCEDURE — 96375 TX/PRO/DX INJ NEW DRUG ADDON: CPT

## 2021-01-29 PROCEDURE — 80053 COMPREHEN METABOLIC PANEL: CPT

## 2021-01-29 PROCEDURE — 74011000258 HC RX REV CODE- 258: Performed by: EMERGENCY MEDICINE

## 2021-01-29 PROCEDURE — 74011250636 HC RX REV CODE- 250/636: Performed by: EMERGENCY MEDICINE

## 2021-01-29 PROCEDURE — 84484 ASSAY OF TROPONIN QUANT: CPT

## 2021-01-29 PROCEDURE — 85025 COMPLETE CBC W/AUTO DIFF WBC: CPT

## 2021-01-29 PROCEDURE — 96361 HYDRATE IV INFUSION ADD-ON: CPT

## 2021-01-29 PROCEDURE — 74011000250 HC RX REV CODE- 250: Performed by: EMERGENCY MEDICINE

## 2021-01-29 PROCEDURE — 94640 AIRWAY INHALATION TREATMENT: CPT

## 2021-01-29 PROCEDURE — 74011250637 HC RX REV CODE- 250/637: Performed by: EMERGENCY MEDICINE

## 2021-01-29 PROCEDURE — 93005 ELECTROCARDIOGRAM TRACING: CPT

## 2021-01-29 PROCEDURE — 83880 ASSAY OF NATRIURETIC PEPTIDE: CPT

## 2021-01-29 PROCEDURE — 71045 X-RAY EXAM CHEST 1 VIEW: CPT

## 2021-01-29 RX ORDER — AZITHROMYCIN 250 MG/1
500 TABLET, FILM COATED ORAL
Status: COMPLETED | OUTPATIENT
Start: 2021-01-29 | End: 2021-01-29

## 2021-01-29 RX ORDER — CETIRIZINE HCL 10 MG
10 TABLET ORAL DAILY
Qty: 15 TAB | Refills: 0 | Status: SHIPPED | OUTPATIENT
Start: 2021-01-29 | End: 2021-04-16 | Stop reason: ALTCHOICE

## 2021-01-29 RX ORDER — ONDANSETRON 2 MG/ML
4 INJECTION INTRAMUSCULAR; INTRAVENOUS
Status: COMPLETED | OUTPATIENT
Start: 2021-01-29 | End: 2021-01-29

## 2021-01-29 RX ORDER — AZITHROMYCIN 250 MG/1
TABLET, FILM COATED ORAL
Qty: 6 TAB | Refills: 0 | Status: SHIPPED | OUTPATIENT
Start: 2021-01-29 | End: 2021-02-03

## 2021-01-29 RX ORDER — DIPHENHYDRAMINE HYDROCHLORIDE 50 MG/ML
12.5 INJECTION, SOLUTION INTRAMUSCULAR; INTRAVENOUS
Status: COMPLETED | OUTPATIENT
Start: 2021-01-29 | End: 2021-01-29

## 2021-01-29 RX ORDER — IPRATROPIUM BROMIDE AND ALBUTEROL SULFATE 2.5; .5 MG/3ML; MG/3ML
3 SOLUTION RESPIRATORY (INHALATION)
Status: COMPLETED | OUTPATIENT
Start: 2021-01-29 | End: 2021-01-29

## 2021-01-29 RX ORDER — GUAIFENESIN/DEXTROMETHORPHAN 100-10MG/5
10 SYRUP ORAL
Status: COMPLETED | OUTPATIENT
Start: 2021-01-29 | End: 2021-01-29

## 2021-01-29 RX ORDER — POTASSIUM CHLORIDE 20 MEQ/1
40 TABLET, EXTENDED RELEASE ORAL
Status: COMPLETED | OUTPATIENT
Start: 2021-01-29 | End: 2021-01-29

## 2021-01-29 RX ORDER — ACETAMINOPHEN 500 MG
1000 TABLET ORAL
Qty: 50 TAB | Refills: 0 | Status: SHIPPED | OUTPATIENT
Start: 2021-01-29 | End: 2021-10-13

## 2021-01-29 RX ORDER — DEXAMETHASONE SODIUM PHOSPHATE 4 MG/ML
10 INJECTION, SOLUTION INTRA-ARTICULAR; INTRALESIONAL; INTRAMUSCULAR; INTRAVENOUS; SOFT TISSUE
Status: COMPLETED | OUTPATIENT
Start: 2021-01-29 | End: 2021-01-29

## 2021-01-29 RX ORDER — FAMOTIDINE 10 MG/ML
20 INJECTION INTRAVENOUS
Status: COMPLETED | OUTPATIENT
Start: 2021-01-29 | End: 2021-01-29

## 2021-01-29 RX ORDER — ACETAMINOPHEN 500 MG
1000 TABLET ORAL
Status: COMPLETED | OUTPATIENT
Start: 2021-01-29 | End: 2021-01-29

## 2021-01-29 RX ORDER — FAMOTIDINE 20 MG/1
20 TABLET, FILM COATED ORAL 2 TIMES DAILY
Qty: 20 TAB | Refills: 0 | Status: SHIPPED | OUTPATIENT
Start: 2021-01-29 | End: 2021-02-08

## 2021-01-29 RX ORDER — DEXAMETHASONE 4 MG/1
4 TABLET ORAL 2 TIMES DAILY WITH MEALS
Qty: 8 TAB | Refills: 0 | Status: SHIPPED | OUTPATIENT
Start: 2021-01-29 | End: 2021-02-02

## 2021-01-29 RX ORDER — ALBUTEROL SULFATE 90 UG/1
2 AEROSOL, METERED RESPIRATORY (INHALATION)
Qty: 1 INHALER | Refills: 0 | Status: SHIPPED | OUTPATIENT
Start: 2021-01-29

## 2021-01-29 RX ORDER — ONDANSETRON 4 MG/1
4-8 TABLET, ORALLY DISINTEGRATING ORAL
Qty: 15 TAB | Refills: 0 | Status: SHIPPED | OUTPATIENT
Start: 2021-01-29 | End: 2021-04-16 | Stop reason: ALTCHOICE

## 2021-01-29 RX ADMIN — FAMOTIDINE 20 MG: 10 INJECTION INTRAVENOUS at 03:43

## 2021-01-29 RX ADMIN — DEXAMETHASONE SODIUM PHOSPHATE 10 MG: 4 INJECTION, SOLUTION INTRAMUSCULAR; INTRAVENOUS at 04:33

## 2021-01-29 RX ADMIN — POTASSIUM CHLORIDE 40 MEQ: 1500 TABLET, EXTENDED RELEASE ORAL at 04:48

## 2021-01-29 RX ADMIN — CEFTRIAXONE 1 G: 1 INJECTION, POWDER, FOR SOLUTION INTRAMUSCULAR; INTRAVENOUS at 04:44

## 2021-01-29 RX ADMIN — GUAIFENESIN AND DEXTROMETHORPHAN 10 ML: 100; 10 SYRUP ORAL at 03:42

## 2021-01-29 RX ADMIN — SODIUM CHLORIDE 1000 ML: 9 INJECTION, SOLUTION INTRAVENOUS at 03:46

## 2021-01-29 RX ADMIN — SODIUM CHLORIDE 1000 ML: 9 INJECTION, SOLUTION INTRAVENOUS at 05:39

## 2021-01-29 RX ADMIN — DIPHENHYDRAMINE HYDROCHLORIDE 12.5 MG: 50 INJECTION, SOLUTION INTRAMUSCULAR; INTRAVENOUS at 03:43

## 2021-01-29 RX ADMIN — IPRATROPIUM BROMIDE AND ALBUTEROL SULFATE 3 ML: .5; 3 SOLUTION RESPIRATORY (INHALATION) at 04:40

## 2021-01-29 RX ADMIN — AZITHROMYCIN MONOHYDRATE 500 MG: 250 TABLET ORAL at 04:43

## 2021-01-29 RX ADMIN — ONDANSETRON 4 MG: 2 INJECTION INTRAMUSCULAR; INTRAVENOUS at 03:43

## 2021-01-29 RX ADMIN — ACETAMINOPHEN 1000 MG: 500 TABLET ORAL at 04:01

## 2021-01-29 NOTE — ED TRIAGE NOTES
Pt arrived via EMS for reported weakness. Pt states she was dx with covid and Monday and has been feeling very weak. Pt states she gets very tired easily when moving around in the home. Pt also reports nausea, diarrhea and no appetite.

## 2021-01-29 NOTE — ED NOTES
The IV site infiltrated resulting in about 300-400 mL of NS and about 50 mL of Rocephin infusing in to the infiltrated site. Pharmacy was called, and stated heat and elevation, no other treatment required.

## 2021-01-29 NOTE — ED PROVIDER NOTES
Leeanne Meyer is a 77 y.o. female with history of atrial flutter, cardiomyopathy who was diagnosed with Covid this past Tuesday with complaints of not feeling well with nausea no real vomiting. Patient also feels short of breath slightly and has had a cough with some sputum but no hemoptysis. No fever. She states she just feels generally weak all over. Small amount of diarrhea. No blood in her stool or melena. No increased pain or swelling in her extremities. Symptoms are worse with activity. She is not able to eat. The history is provided by the patient, medical records and the EMS personnel. Past Medical History:   Diagnosis Date    Atrial flutter     CHADS score 1  (-CHF, +HTN, -AGE, -DM, -CVA)    Atrial flutter ablation     3/13    Cardiomyopathy ([facility] [address]     LVEF 30% (03/17)    Dyslipidemia     Hypertension     Obstructive sleep apnea     CPAP    Pacemaker     3/13 ([employer])       Past Surgical History:   Procedure Laterality Date    HX HYSTERECTOMY      ? years ago   [address] HX OOPHORECTOMY           Family History:   Problem Relation Age of Onset    Breast Cancer Sister 59       Social History     Socioeconomic History    Marital status:      Spouse name: Not on file    Number of children: Not on file    Years of education: Not on file    Highest education level: Not on file   Occupational History    Not on file   Social Needs    Financial resource strain: Not on file    Food insecurity     Worry: Not on file     Inability: Not on file    Transportation needs     Medical: Not on file     Non-medical: Not on file   Tobacco Use    Smoking status: Never Smoker    Smokeless tobacco: Never Used   Substance and Sexual Activity    Alcohol use:  Yes    Drug use: No    Sexual activity: Not on file   Lifestyle    Physical activity     Days per week: Not on file     Minutes per session: Not on file    Stress: Not on file   Relationships    Social connections     Talks on phone: Not on file     Gets together: Not on file     Attends Oriental orthodox service: Not on file     Active member of club or organization: Not on file     Attends meetings of clubs or organizations: Not on file     Relationship status: Not on file    Intimate partner violence     Fear of current or ex partner: Not on file     Emotionally abused: Not on file     Physically abused: Not on file     Forced sexual activity: Not on file   Other Topics Concern    Not on file   Social History Narrative    Not on file         ALLERGIES: Patient has no known allergies. Review of Systems   Constitutional: Positive for appetite change and fatigue. Negative for fever. HENT: Positive for postnasal drip and rhinorrhea. Negative for sore throat. Respiratory: Positive for cough and shortness of breath. Negative for wheezing. Cardiovascular: Negative for chest pain and leg swelling. Gastrointestinal: Negative for abdominal pain. Genitourinary: Negative for difficulty urinating, dysuria and frequency. Musculoskeletal: Negative for gait problem. Skin: Negative for rash. Neurological: Positive for weakness. Hematological: Bruises/bleeds easily. Psychiatric/Behavioral: Positive for sleep disturbance. Vitals:    01/29/21 0521 01/29/21 0530 01/29/21 0545 01/29/21 0558   BP:  (!) 96/48  (!) 97/52   Pulse: 75 74 73 75   Resp: 19 30 28 27   Temp:       SpO2: 95% 91% 93% 92%            Physical Exam  Vitals signs and nursing note reviewed. Constitutional:       General: She is in acute distress. Appearance: She is well-developed. She is obese. She is not toxic-appearing or diaphoretic. HENT:      Head: Normocephalic and atraumatic. Right Ear: External ear normal.      Left Ear: External ear normal.      Nose: Nose normal.      Mouth/Throat:      Pharynx: Uvula midline. Eyes:      General: No scleral icterus. Conjunctiva/sclera: Conjunctivae normal.   Neck:      Musculoskeletal: Neck supple. Cardiovascular:      Rate and Rhythm: Normal rate and regular rhythm. Heart sounds: Normal heart sounds. Pulmonary:      Effort: Pulmonary effort is normal.      Breath sounds: Normal breath sounds. Abdominal:      Palpations: Abdomen is soft. Tenderness: There is no abdominal tenderness. Musculoskeletal:      Right lower leg: No edema. Left lower leg: No edema. Skin:     General: Skin is warm and dry. Neurological:      Mental Status: She is alert and oriented to person, place, and time. Gait: Gait normal.   Psychiatric:         Behavior: Behavior normal.          MDM       Procedures  Vitals:  No data found. Medications ordered:   Medications   sodium chloride 0.9 % bolus infusion 1,000 mL (0 mL IntraVENous IV Completed 1/29/21 0455)   ondansetron (ZOFRAN) injection 4 mg (4 mg IntraVENous Given 1/29/21 0343)   diphenhydrAMINE (BENADRYL) injection 12.5 mg (12.5 mg IntraVENous Given 1/29/21 0343)   guaiFENesin-dextromethorphan (ROBITUSSIN DM) 100-10 mg/5 mL syrup 10 mL (10 mL Oral Given 1/29/21 0342)   famotidine (PF) (PEPCID) injection 20 mg (20 mg IntraVENous Given 1/29/21 0343)   dexamethasone (DECADRON) 4 mg/mL injection 10 mg (10 mg IntraVENous Given 1/29/21 0433)   azithromycin (ZITHROMAX) tablet 500 mg (500 mg Oral Given 1/29/21 0443)   cefTRIAXone (ROCEPHIN) 1 g in 0.9% sodium chloride (MBP/ADV) 50 mL MBP (0 g IntraVENous IV Completed 1/29/21 0455)   acetaminophen (TYLENOL) tablet 1,000 mg (1,000 mg Oral Given 1/29/21 0401)   albuterol-ipratropium (DUO-NEB) 2.5 MG-0.5 MG/3 ML (3 mL Nebulization Given 1/29/21 0440)   potassium chloride (K-DUR, KLOR-CON) SR tablet 40 mEq (40 mEq Oral Given 1/29/21 0448)   sodium chloride 0.9 % bolus infusion 1,000 mL (0 mL IntraVENous IV Completed 1/29/21 1798)         Lab findings:  No results found for this or any previous visit (from the past 12 hour(s)).     EKG interpretation by ED Physician:  AV paced rhythm with no acute ST or T wave changes  Rate 73    No significant change from previous    Cardiac monitor: Normal rate with regular rhythm no ectopy  Pulse ox interpretation: 95 to 96% room air, normal    X-Ray, CT or other radiology findings or impressions:  XR CHEST PORT   Final Result      Indistinct bibasilar pulmonary opacities with somewhat peripheral predominance   suspicious for atypical pneumonia. Atelectasis is possible. Progress notes, Consult notes or additional Procedure notes:   Patient with Covid respiratory infection. No significant hypoxia or other acute reason for admission at this point. Discussed with patient regarding diagnosis and continued treatment at home. Discussed with her it is possible she may have to return if symptoms worsen which may require admission. Will place her on antibiotics along with steroids and additional medications to assist with her symptoms    I have discussed with patient and/or family/sig other the results, interpretation of any imaging if performed, suspected diagnosis and treatment plan to include instructions regarding the diagnoses listed to which understanding was expressed with all questions answered    ED Critical Care Note    System at risk for life threatening failure: Neuro, cardiac, pulmonary, renal  Associated problems: Covid pneumonia, hypokalemia, fever    Critical Care services provided: Bedside management of Covid infection, hypokalemia, evaluation of possible respiratory compromise or need for admission, documentation, bedside reassessment  Excluded procedures (time not included in critical care): EKG interpretation    Total Critical Care Time (in minutes) 33          Reevaluation of patient:   stable    Disposition:  Diagnosis:   1. Respiratory tract infection due to COVID-19 virus    2. Nausea without vomiting    3.  Dehydration        Disposition: home    Follow-up Information     Follow up With Specialties Details Why [address] [facility] Alvin Kennedy MD Family Medicine Schedule an appointment as soon as possible for a visit   [address] EMERGENCY DEPT Emergency Medicine  If symptoms worsen [address]  [phone]            Discharge Medication List as of 1/29/2021  5:42 AM      START taking these medications    Details   dexAMETHasone (DECADRON) 4 mg tablet Take 4 mg by mouth two (2) times daily (with meals) for 4 days. , Normal, Disp-8 Tab, R-0      albuterol (PROVENTIL HFA, VENTOLIN HFA, PROAIR HFA) 90 mcg/actuation inhaler Take 2 Puffs by inhalation every four (4) hours as needed for Wheezing., Normal, Disp-1 Inhaler, R-0      azithromycin (Zithromax Z-Jacobo) 250 mg tablet Take as package directed, Normal, Disp-6 Tab, R-0      acetaminophen (Tylenol Extra Strength) 500 mg tablet Take 2 Tabs by mouth every six (6) hours as needed for Pain or Fever., Normal, Disp-50 Tab, R-0      guaiFENesin-dextromethorphan SR (Mucinex DM) 600-30 mg per tablet Take 1 Tab by mouth two (2) times a day., Normal, Disp-20 Tab, R-0      cetirizine (ZYRTEC) 10 mg tablet Take 1 Tab by mouth daily. , Normal, Disp-15 Tab, R-0      ondansetron (Zofran ODT) 4 mg disintegrating tablet Take 1-2 Tabs by mouth every eight (8) hours as needed for Nausea or Vomiting., Normal, Disp-15 Tab, R-0      famotidine (Pepcid) 20 mg tablet Take 1 Tab by mouth two (2) times a day for 10 days. , Normal, Disp-20 Tab, R-0         CONTINUE these medications which have NOT CHANGED    Details   Eliquis 5 mg tablet TAKE 1 TABLET BY MOUTH TWICE DAILY, Normal, Disp-180 Tab, R-3      bumetanide (BUMEX) 1 mg tablet TAKE 1 TABLET BY MOUTH DAILY, Normal, Disp-30 Tab, R-6      carvediloL (COREG) 6.25 mg tablet TAKE 1 TABLET BY MOUTH TWICE DAILY WITH MEALS, Normal, Disp-60 Tab, R-6      atorvastatin (LIPITOR) 20 mg tablet TAKE 1 TABLET BY MOUTH ONCE DAILY, Normal, Disp-90 Tab, R-3**Patient requests 90 days supply**      losartan (COZAAR) 25 mg tablet take 1 tablet by mouth once daily, Historical Med, R-0      buPROPion XL (WELLBUTRIN XL) 150 mg tablet Take 150 mg by mouth every morning. Historical Med, 150 mg      multivitamin, stress formula (STRESS TAB) tablet Take 1 Tab by mouth daily. Historical Med, 1 Tab      cholecalciferol (VITAMIN D3) 1,000 unit tablet Take  by mouth daily. Historical Med      omega-3 fatty acids-vitamin e (FISH OIL) 1,000 mg cap Take 1 Cap by mouth. Historical Med, 1 Cap      Desvenlafaxine SR (PRISTIQ) 50 mg tablet Take 50 mg by mouth daily.     Indications: GENERALIZED ANXIETY DISORDERHistorical Med, 50 mg

## 2021-01-30 ENCOUNTER — PATIENT OUTREACH (OUTPATIENT)
Dept: CASE MANAGEMENT | Age: 66
End: 2021-01-30

## 2021-01-30 LAB
ATRIAL RATE: 73 BPM
CALCULATED P AXIS, ECG09: 71 DEGREES
CALCULATED R AXIS, ECG10: 135 DEGREES
CALCULATED T AXIS, ECG11: -14 DEGREES
DIAGNOSIS, 93000: NORMAL
P-R INTERVAL, ECG05: 128 MS
Q-T INTERVAL, ECG07: 468 MS
QRS DURATION, ECG06: 178 MS
QTC CALCULATION (BEZET), ECG08: 515 MS
VENTRICULAR RATE, ECG03: 73 BPM

## 2021-01-30 NOTE — PROGRESS NOTES
Unable to reach (2nd attempt)    Attempt made to reach the patient by telephone. Left HIPPA compliant message requesting a return call. Episode will be resolved. No further follow up will be required at this time.

## 2021-02-10 ENCOUNTER — OFFICE VISIT (OUTPATIENT)
Dept: CARDIOLOGY CLINIC | Age: 66
End: 2021-02-10
Payer: MEDICARE

## 2021-02-10 DIAGNOSIS — I42.9 CARDIOMYOPATHY, UNSPECIFIED TYPE (HCC): ICD-10-CM

## 2021-02-10 DIAGNOSIS — Z95.810 AICD (AUTOMATIC CARDIOVERTER/DEFIBRILLATOR) PRESENT: Primary | ICD-10-CM

## 2021-02-10 PROCEDURE — 93295 DEV INTERROG REMOTE 1/2/MLT: CPT | Performed by: INTERNAL MEDICINE

## 2021-02-10 PROCEDURE — 93296 REM INTERROG EVL PM/IDS: CPT | Performed by: INTERNAL MEDICINE

## 2021-02-22 NOTE — PROGRESS NOTES
I have personally seen and evaluated the device findings. Interrogation reviewed and I agree with assessment.     Raymundo Castañeda

## 2021-03-29 RX ORDER — CARVEDILOL 6.25 MG/1
TABLET ORAL
Qty: 180 TAB | Refills: 4 | Status: SHIPPED | OUTPATIENT
Start: 2021-03-29 | End: 2022-03-31

## 2021-04-16 ENCOUNTER — OFFICE VISIT (OUTPATIENT)
Dept: CARDIOLOGY CLINIC | Age: 66
End: 2021-04-16
Payer: MEDICARE

## 2021-04-16 VITALS
SYSTOLIC BLOOD PRESSURE: 133 MMHG | HEART RATE: 77 BPM | OXYGEN SATURATION: 98 % | BODY MASS INDEX: 29.81 KG/M2 | DIASTOLIC BLOOD PRESSURE: 75 MMHG | WEIGHT: 162 LBS | RESPIRATION RATE: 14 BRPM | HEIGHT: 62 IN | TEMPERATURE: 98.1 F

## 2021-04-16 DIAGNOSIS — I48.3 TYPICAL ATRIAL FLUTTER (HCC): Primary | ICD-10-CM

## 2021-04-16 PROCEDURE — 1101F PT FALLS ASSESS-DOCD LE1/YR: CPT | Performed by: INTERNAL MEDICINE

## 2021-04-16 PROCEDURE — G8399 PT W/DXA RESULTS DOCUMENT: HCPCS | Performed by: INTERNAL MEDICINE

## 2021-04-16 PROCEDURE — G8427 DOCREV CUR MEDS BY ELIG CLIN: HCPCS | Performed by: INTERNAL MEDICINE

## 2021-04-16 PROCEDURE — 3017F COLORECTAL CA SCREEN DOC REV: CPT | Performed by: INTERNAL MEDICINE

## 2021-04-16 PROCEDURE — G8510 SCR DEP NEG, NO PLAN REQD: HCPCS | Performed by: INTERNAL MEDICINE

## 2021-04-16 PROCEDURE — 99214 OFFICE O/P EST MOD 30 MIN: CPT | Performed by: INTERNAL MEDICINE

## 2021-04-16 PROCEDURE — G8536 NO DOC ELDER MAL SCRN: HCPCS | Performed by: INTERNAL MEDICINE

## 2021-04-16 PROCEDURE — G9899 SCRN MAM PERF RSLTS DOC: HCPCS | Performed by: INTERNAL MEDICINE

## 2021-04-16 PROCEDURE — G8419 CALC BMI OUT NRM PARAM NOF/U: HCPCS | Performed by: INTERNAL MEDICINE

## 2021-04-16 PROCEDURE — 1090F PRES/ABSN URINE INCON ASSESS: CPT | Performed by: INTERNAL MEDICINE

## 2021-04-16 RX ORDER — BUDESONIDE AND FORMOTEROL FUMARATE DIHYDRATE 160; 4.5 UG/1; UG/1
2 AEROSOL RESPIRATORY (INHALATION)
COMMUNITY

## 2021-04-16 NOTE — PATIENT INSTRUCTIONS
Testing Echo Please call our office at [phone] to schedule testing.   
**call office 3-5 days after testing is completed for results**

## 2021-04-16 NOTE — PROGRESS NOTES
Tc Lorenzo presents today for   Chief Complaint   Patient presents with   [address] preferred language for health care discussion is english/other. Personal Protective Equipment:   Personal Protective Equipment was used including: mask-surgical and hands-gloves. Patient was placed on no precaution(s). Patient was masked. Precautions:   Patient currently on None  Patient currently roomed with door closed    Is someone accompanying this pt? no    Is the patient using any DME equipment during [address]? no    Depression Screening:  3 most recent PHQ Screens 4/16/2021   Little interest or pleasure in doing things Not at all   Feeling down, depressed, irritable, or hopeless Not at all   Total Score PHQ 2 0       Learning Assessment:  Learning Assessment 6/20/2016   PRIMARY LEARNER Patient   PRIMARY LANGUAGE ENGLISH   LEARNER PREFERENCE PRIMARY OTHER (COMMENT)   ANSWERED BY Patient   RELATIONSHIP SELF       Abuse Screening:  No flowsheet data found. Fall Risk  Fall Risk Assessment, last 12 mths 4/16/2021   Able to walk? Yes   Fall in past 12 months? 0   Do you feel unsteady? 0   Are you worried about falling 0       Pt currently taking Anticoagulant therapy? no    Coordination of Care:  1. Have you been to the ER, urgent care clinic since your last visit? Hospitalized since your last visit? no    2. Have you seen or consulted any other health care providers outside of the [address] [place] [address] since your last visit? Include any pap smears or colon screening.  yes

## 2021-04-20 DIAGNOSIS — I42.9 CARDIOMYOPATHY, UNSPECIFIED TYPE (HCC): Primary | ICD-10-CM

## 2021-04-24 NOTE — PROGRESS NOTES
Subjective:      Homar Villarreal is in the office today for cardiac reevaluation. She is a 52-year-old woman with hypertension, history of atrial flutter with symptomatic bradycardia, and prior atrial flutter ablation, as well as permanent pacemaker implantation in March of 2015. She was admitted to [facility] in 06/2017 with increasing shortness of breath and chest pressure. She was felt to have acute on chronic systolic failure. Her failure resolved fairly quickly with intravenous diuretics with a normalization of her chest x-ray, as well as her BNP. In January and early February of 2018, she began to experience more SOB and was once again  found to be in atrial fibrillation. She was evaluated by Dr Jackie Escobar and there were plans for a possible second ablative attempt. However, JINA demonstrated a possible clot in the LA appendage. Since her EF remained at 30% for > 3 months on appropriate therapy, her pacemaker was upgraded to a BiV AICD. In the office on 4/24/2019, she reported that she was doing well. Her breathing had been good which she attributed to the Symbicort. She was not having chest pain. She  had some minimal swelling in her lower extremities. She continued to be very cognizant of her salt intake. In the office today, she reports of COVID-19 infection since her last visit. She has generally done fairly well in that regard. She informs me that she has recently been diagnosed with osteoporosis. She has had no chest pain or limiting shortness of breath.   She has been riding a stationary bicycle for exercise  Patient Active Problem List    Diagnosis Date Noted    S/P biventricular cardiac pacemaker procedure 05/14/2018     Priority: 1 - One    AICD (automatic cardioverter/defibrillator) present 05/14/2018    JOSÉ MIGUEL (obstructive sleep apnea) 08/14/2017    Acute on chronic systolic congestive heart failure ([address] 07/20/2017    Bronchiolitis 07/20/2017    Chest pain 07/19/2017    Hypertension     Dyslipidemia     Atrial flutter       Current Outpatient Medications   Medication Sig Dispense Refill    budesonide-formoteroL (Symbicort) 160-4.5 mcg/actuation HFAA Take 2 Puffs by inhalation.  carvediloL (COREG) 6.25 mg tablet TAKE 1 TABLET BY MOUTH TWICE DAILY WITH MEALS 180 Tab 4    albuterol (PROVENTIL HFA, VENTOLIN HFA, PROAIR HFA) 90 mcg/actuation inhaler Take 2 Puffs by inhalation every four (4) hours as needed for Wheezing. 1 Inhaler 0    acetaminophen (Tylenol Extra Strength) 500 mg tablet Take 2 Tabs by mouth every six (6) hours as needed for Pain or Fever. 50 Tab 0    guaiFENesin-dextromethorphan SR (Mucinex DM) 600-30 mg per tablet Take 1 Tab by mouth two (2) times a day. 20 Tab 0    Eliquis 5 mg tablet TAKE 1 TABLET BY MOUTH TWICE DAILY 180 Tab 3    bumetanide (BUMEX) 1 mg tablet TAKE 1 TABLET BY MOUTH DAILY 30 Tab 6    atorvastatin (LIPITOR) 20 mg tablet TAKE 1 TABLET BY MOUTH ONCE DAILY 90 Tab 3    losartan (COZAAR) 25 mg tablet take 1 tablet by mouth once daily  0    buPROPion XL (WELLBUTRIN XL) 150 mg tablet Take 150 mg by mouth every morning.  multivitamin, stress formula (STRESS TAB) tablet Take 1 Tab by mouth daily.  cholecalciferol (VITAMIN D3) 1,000 unit tablet Take  by mouth daily.  omega-3 fatty acids-vitamin e (FISH OIL) 1,000 mg cap Take 1 Cap by mouth.  Desvenlafaxine SR (PRISTIQ) 50 mg tablet Take 50 mg by mouth daily. Indications: GENERALIZED ANXIETY DISORDER       No Known Allergies  Past Medical History:   Diagnosis Date    Atrial flutter     CHADS score 1  (-CHF, +HTN, -AGE, -DM, -CVA)    Atrial flutter ablation     3/13    Cardiomyopathy ([facility] [address]     LVEF 30% (03/17)    Dyslipidemia     Hypertension     Obstructive sleep apnea     CPAP    Pacemaker     3/13 (MEDTRONIC)     Past Surgical History:   Procedure Laterality Date    HX HYSTERECTOMY      ?  years ago    HX OOPHORECTOMY       Family History Problem Relation Age of Onset    Breast Cancer Sister 59     Social History     Tobacco Use   Smoking Status Never Smoker   Smokeless Tobacco Never Used          Review of Systems, additional:  Constitutional: negative  Eyes: negative  Respiratory: negative  Cardiovascular: negative  Gastrointestinal: negative  Musculoskeletal:negative  Neurological: negative  Behvioral/Psych: negative  Endocrine: negative  ENT: negative    Objective:     Visit Vitals  /75 (BP 1 Location: Left upper arm, BP Patient Position: Sitting, BP Cuff Size: Large adult)   Pulse 77   Temp 98.1 °F (36.7 °C) (Temporal)   Resp 14   Ht 5' 2\" (1.575 m)   Wt 162 lb (73.5 kg)   SpO2 98%   BMI 29.63 kg/m²     General:  alert, cooperative, no distress   Chest Wall: inspection normal - no chest wall deformities or tenderness, respiratory effort normal   Lung: clear to auscultation bilaterally   Heart:  normal rate and regular rhythm, no murmurs noted, no gallops noted   Abdomen: soft, non-tender. Bowel sounds normal. No masses,  no organomegaly   Extremities: extremities normal, atraumatic, no cyanosis or edema Skin: no rashes   Neuro: alert, oriented, normal speech, no focal findings or movement disorder noted         Assessment/Plan:       ICD-10-CM ICD-9-CM    1. Acute on chronic systolic congestive heart failure ([address]  Episode in 06/2017 with hospitalization at [facility]. Return in 3 months with echocardiogram done prior to visit. I50.23 428.23      428.0    2. Typical atrial flutter ([address] S/P ablation 03/2013 with return of atrial dysrhythmia. CHADS score 1. Plan was to repeat Echo which demonstrated an EF of 35% and normal LA size. Second ablative procedure considered but patient had atrial appendage clot and worsened LV function with EF 30%. BiV AICD placed 4/24/2018. Continues with stable course. Echo repeated on 12/20/2019. EF is 45 to 50%. There was grade 1 diastolic dysfunction. I48.3 427.32    3.  Hypertension, well controlled in office today. I11.9 402.10    4. Biventricular AICD   in situ, [employer] 04/24/2018      5. Chest pain, unspecified type, none recently R07.9 786.50    6. Dyslipidemia LDL is 100 on 9/24/2019. PCP follows E78.5 272.4    7. JOSÉ MIGUEL (obstructive sleep apnea) G47.33 327.23    8. Bronchiectasis, high resolution CT 11/18/2019; mild central bronchiectasis.   Followed by Dr. Ran Garcia.    9       COVID-19 infection, she and her  both infected 2020

## 2021-06-04 RX ORDER — BUMETANIDE 1 MG/1
TABLET ORAL
Qty: 30 TABLET | Refills: 6 | Status: SHIPPED | OUTPATIENT
Start: 2021-06-04 | End: 2021-07-21 | Stop reason: SDUPTHER

## 2021-06-04 RX ORDER — BUMETANIDE 1 MG/1
TABLET ORAL
Qty: 30 TABLET | Refills: 6 | Status: SHIPPED | OUTPATIENT
Start: 2021-06-04 | End: 2021-09-28 | Stop reason: SDUPTHER

## 2021-06-09 ENCOUNTER — OFFICE VISIT (OUTPATIENT)
Dept: CARDIOLOGY CLINIC | Age: 66
End: 2021-06-09
Payer: MEDICARE

## 2021-06-09 DIAGNOSIS — Z95.810 AICD (AUTOMATIC CARDIOVERTER/DEFIBRILLATOR) PRESENT: Primary | ICD-10-CM

## 2021-06-09 DIAGNOSIS — I42.9 CARDIOMYOPATHY, UNSPECIFIED TYPE (HCC): ICD-10-CM

## 2021-06-09 PROCEDURE — 93295 DEV INTERROG REMOTE 1/2/MLT: CPT | Performed by: INTERNAL MEDICINE

## 2021-06-11 NOTE — PROGRESS NOTES
In persistent afib since March, on Eliquis  . 3.6 years on battery. Lead impedance and threshold WNL. BIV pacing 99%. A sensed 99%. OP Vol WNl.

## 2021-07-21 ENCOUNTER — OFFICE VISIT (OUTPATIENT)
Dept: CARDIOLOGY CLINIC | Age: 66
End: 2021-07-21
Payer: MEDICARE

## 2021-07-21 VITALS
HEART RATE: 88 BPM | WEIGHT: 166 LBS | BODY MASS INDEX: 30.55 KG/M2 | DIASTOLIC BLOOD PRESSURE: 84 MMHG | SYSTOLIC BLOOD PRESSURE: 139 MMHG | OXYGEN SATURATION: 97 % | HEIGHT: 62 IN

## 2021-07-21 DIAGNOSIS — I50.23 ACUTE ON CHRONIC SYSTOLIC CONGESTIVE HEART FAILURE (HCC): Primary | ICD-10-CM

## 2021-07-21 PROCEDURE — G8427 DOCREV CUR MEDS BY ELIG CLIN: HCPCS | Performed by: INTERNAL MEDICINE

## 2021-07-21 PROCEDURE — 3017F COLORECTAL CA SCREEN DOC REV: CPT | Performed by: INTERNAL MEDICINE

## 2021-07-21 PROCEDURE — 99214 OFFICE O/P EST MOD 30 MIN: CPT | Performed by: INTERNAL MEDICINE

## 2021-07-21 PROCEDURE — 1101F PT FALLS ASSESS-DOCD LE1/YR: CPT | Performed by: INTERNAL MEDICINE

## 2021-07-21 PROCEDURE — G9899 SCRN MAM PERF RSLTS DOC: HCPCS | Performed by: INTERNAL MEDICINE

## 2021-07-21 PROCEDURE — G8510 SCR DEP NEG, NO PLAN REQD: HCPCS | Performed by: INTERNAL MEDICINE

## 2021-07-21 PROCEDURE — 1090F PRES/ABSN URINE INCON ASSESS: CPT | Performed by: INTERNAL MEDICINE

## 2021-07-21 PROCEDURE — G8399 PT W/DXA RESULTS DOCUMENT: HCPCS | Performed by: INTERNAL MEDICINE

## 2021-07-21 PROCEDURE — G8417 CALC BMI ABV UP PARAM F/U: HCPCS | Performed by: INTERNAL MEDICINE

## 2021-07-21 PROCEDURE — G8536 NO DOC ELDER MAL SCRN: HCPCS | Performed by: INTERNAL MEDICINE

## 2021-07-21 NOTE — PROGRESS NOTES
Tino Nugent presents today for   Chief Complaint   Patient presents with    Follow-up     3 month follow up        Tino Nugent preferred language for health care discussion is english/other. Is someone accompanying this pt? no    Is the patient using any DME equipment during [address]? no    Depression Screening:  3 most recent PHQ Screens 7/21/2021   Little interest or pleasure in doing things Not at all   Feeling down, depressed, irritable, or hopeless Not at all   Total Score PHQ 2 0       Learning Assessment:  Learning Assessment 6/20/2016   PRIMARY LEARNER Patient   PRIMARY LANGUAGE ENGLISH   LEARNER PREFERENCE PRIMARY OTHER (COMMENT)   ANSWERED BY Patient   RELATIONSHIP SELF       Abuse Screening:  No flowsheet data found. Fall Risk  Fall Risk Assessment, last 12 mths 7/21/2021   Able to walk? Yes   Fall in past 12 months? 0   Do you feel unsteady? 0   Are you worried about falling 0       Pt currently taking Anticoagulant therapy? Eliquis     Coordination of Care:  1. Have you been to the ER, urgent care clinic since your last visit? Hospitalized since your last visit? No     2. Have you seen or consulted any other health care providers outside of the [address] [place] [address] since your last visit? Include any pap smears or colon screening.  no

## 2021-07-26 NOTE — PROGRESS NOTES
Subjective:      Kelsie Larose is in the office today for cardiac reevaluation. She is a 57-year-old woman with hypertension, history of atrial flutter with symptomatic bradycardia, and prior atrial flutter ablation, as well as permanent pacemaker implantation in March of 2015. She was admitted to [facility] in 06/2017 with increasing shortness of breath and chest pressure. She was felt to have acute on chronic systolic failure. Her failure resolved fairly quickly with intravenous diuretics with a normalization of her chest x-ray, as well as her BNP. In January and early February of 2018, she began to experience more SOB and was once again  found to be in atrial fibrillation. She was evaluated by Dr Tere Chew and there were plans for a possible second ablative attempt. However, JINA demonstrated a possible clot in the LA appendage. Since her EF remained at 30% for > 3 months on appropriate therapy, her pacemaker was upgraded to a BiV AICD. In the office on 4/24/2019, she reported that she was doing well. Her breathing had been good which she attributed to the Symbicort. She was not having chest pain. She  had some minimal swelling in her lower extremities. She continued to be very cognizant of her salt intake. In the office today, she reports that she is doing well. She recently purchased a treadmill and has been using a stationary bicycle. She follows Dr. Randee Harris for her pulmonary condition. He advised her to use the Symbicort on a regular basis. She has no specific complaints in the office today.   Patient Active Problem List    Diagnosis Date Noted    S/P biventricular cardiac pacemaker procedure 05/14/2018    AICD (automatic cardioverter/defibrillator) present 05/14/2018    JOSÉ MIGUEL (obstructive sleep apnea) 08/14/2017    Acute on chronic systolic congestive heart failure ([address] 07/20/2017    Bronchiolitis 07/20/2017    Chest pain 07/19/2017    Hypertension     Dyslipidemia  Atrial flutter       Current Outpatient Medications   Medication Sig Dispense Refill    bumetanide (BUMEX) 1 mg tablet TAKE 1 TABLET BY MOUTH DAILY 30 Tablet 6    budesonide-formoteroL (Symbicort) 160-4.5 mcg/actuation HFAA Take 2 Puffs by inhalation.  carvediloL (COREG) 6.25 mg tablet TAKE 1 TABLET BY MOUTH TWICE DAILY WITH MEALS 180 Tab 4    albuterol (PROVENTIL HFA, VENTOLIN HFA, PROAIR HFA) 90 mcg/actuation inhaler Take 2 Puffs by inhalation every four (4) hours as needed for Wheezing. 1 Inhaler 0    acetaminophen (Tylenol Extra Strength) 500 mg tablet Take 2 Tabs by mouth every six (6) hours as needed for Pain or Fever. 50 Tab 0    guaiFENesin-dextromethorphan SR (Mucinex DM) 600-30 mg per tablet Take 1 Tab by mouth two (2) times a day. 20 Tab 0    Eliquis 5 mg tablet TAKE 1 TABLET BY MOUTH TWICE DAILY 180 Tab 3    atorvastatin (LIPITOR) 20 mg tablet TAKE 1 TABLET BY MOUTH ONCE DAILY 90 Tab 3    losartan (COZAAR) 25 mg tablet take 1 tablet by mouth once daily  0    buPROPion XL (WELLBUTRIN XL) 150 mg tablet Take 150 mg by mouth every morning.  multivitamin, stress formula (STRESS TAB) tablet Take 1 Tab by mouth daily.  cholecalciferol (VITAMIN D3) 1,000 unit tablet Take  by mouth daily.  omega-3 fatty acids-vitamin e (FISH OIL) 1,000 mg cap Take 1 Cap by mouth.  Desvenlafaxine SR (PRISTIQ) 50 mg tablet Take 50 mg by mouth daily. Indications: GENERALIZED ANXIETY DISORDER       No Known Allergies  Past Medical History:   Diagnosis Date    Atrial flutter     CHADS score 1  (-CHF, +HTN, -AGE, -DM, -CVA)    Atrial flutter ablation     3/13    Cardiomyopathy ([facility]     LVEF 30% (03/17)    Dyslipidemia     Hypertension     Obstructive sleep apnea     CPAP    Pacemaker     3/13 (MEDTRONIC)     Past Surgical History:   Procedure Laterality Date    HX HYSTERECTOMY      ?  years ago    HX OOPHORECTOMY       Family History   Problem Relation Age of Onset    Breast Cancer Sister 59     Social History     Tobacco Use   Smoking Status Never Smoker   Smokeless Tobacco Never Used          Review of Systems, additional:  Constitutional: negative  Eyes: negative  Respiratory: negative  Cardiovascular: negative  Gastrointestinal: negative  Musculoskeletal:negative  Neurological: negative  Behvioral/Psych: negative  Endocrine: negative  ENT: negative    Objective:     Visit Vitals  /84 (BP 1 Location: Left upper arm, BP Patient Position: Sitting, BP Cuff Size: Adult)   Pulse 88   Ht 5' 2\" (1.575 m)   Wt 75.3 kg (166 lb)   SpO2 97%   BMI 30.36 kg/m²     General:  alert, cooperative, no distress   Chest Wall: inspection normal - no chest wall deformities or tenderness, respiratory effort normal   Lung: clear to auscultation bilaterally   Heart:  normal rate and regular rhythm, no murmurs noted, no gallops noted   Abdomen: soft, non-tender. Bowel sounds normal. No masses,  no organomegaly   Extremities: extremities normal, atraumatic, no cyanosis or edema Skin: no rashes   Neuro: alert, oriented, normal speech, no focal findings or movement disorder noted         Assessment/Plan:       ICD-10-CM ICD-9-CM    1. Acute on chronic systolic congestive heart failure ([address] Echocardiogram completed 4/21/2021. EF 45 to 50%. Grade 2 diastolic dysfunction. Mild TR. Return in 6 months I50.23 428.23      428.0    2. Typical atrial flutter ([address] S/P ablation 03/2013 with return of atrial dysrhythmia. CHADS score 1. Plan was to repeat Echo which demonstrated an EF of 35% and normal LA size. Second ablative procedure considered but patient had atrial appendage clot and worsened LV function with EF 30%. BiV AICD placed 4/24/2018. Continued stable course. Echo repeated on 4/21/2021./EF is 45 to 50%. There was grade 2 diastolic dysfunction. I48.3 427.32    3. Hypertension,  controlled in office today. I11.9 402.10    4. Biventricular AICD   in situ, [employer] 04/24/2018      5.  Chest pain, unspecified type, none recently R07.9 786.50    6. Dyslipidemia LDL is 100 on 9/24/2019. PCP follows E78.5 272.4    7. JOSÉ MIGUEL (obstructive sleep apnea) G47.33 327.23    8. Bronchiectasis, high resolution CT 11/18/2019; mild central bronchiectasis.   Followed by Dr. Shruthi Espitia.    9       COVID-19 infection, she and her  both infected 2020

## 2021-09-01 RX ORDER — ATORVASTATIN CALCIUM 20 MG/1
TABLET, FILM COATED ORAL
Qty: 90 TABLET | Refills: 3 | Status: SHIPPED | OUTPATIENT
Start: 2021-09-01 | End: 2022-08-28

## 2021-09-15 ENCOUNTER — OFFICE VISIT (OUTPATIENT)
Dept: CARDIOLOGY CLINIC | Age: 66
End: 2021-09-15
Payer: MEDICARE

## 2021-09-15 DIAGNOSIS — Z95.810 AICD (AUTOMATIC CARDIOVERTER/DEFIBRILLATOR) PRESENT: Primary | ICD-10-CM

## 2021-09-15 DIAGNOSIS — I42.9 CARDIOMYOPATHY, UNSPECIFIED TYPE (HCC): ICD-10-CM

## 2021-09-15 PROCEDURE — 93296 REM INTERROG EVL PM/IDS: CPT | Performed by: INTERNAL MEDICINE

## 2021-09-15 PROCEDURE — 93295 DEV INTERROG REMOTE 1/2/MLT: CPT | Performed by: INTERNAL MEDICINE

## 2021-09-17 NOTE — PROGRESS NOTES
I have personally seen and evaluated the device findings. Interrogation reviewed and I agree with assessment.     Patricia Aponte

## 2021-09-17 NOTE — PROGRESS NOTES
I have personally seen and evaluated the device findings. Interrogation reviewed and I agree with assessment.     Joslyn Mg

## 2021-09-28 NOTE — TELEPHONE ENCOUNTER
Requested Prescriptions     Pending Prescriptions Disp Refills    bumetanide (BUMEX) 1 mg tablet 30 Tablet 6     Sig: Take 1 Tablet by mouth daily.      Patient going out of town needs the medication

## 2021-09-28 NOTE — TELEPHONE ENCOUNTER
PCP: Hakan Wallace MD    Last appt: 7/21/2021  Future Appointments   Date Time Provider Alyssa Chavezisti   10/13/2021  8:30 AM Thania Cortes MD [facility] BS AMB   12/21/2021 10:30 AM CSI, PACER NORF [facility] BS AMB   1/5/2022  8:00 AM [facility] LENIN STEREO BX RM [address] [facility]   1/19/2022 10:15 AM Thania Cortes MD [facility] BS AMB   3/16/2022  3:45 PM CSI, PACER [facility] BS AMB       Requested Prescriptions     Pending Prescriptions Disp Refills    bumetanide (BUMEX) 1 mg tablet 30 Tablet 6     Sig: Take 1 Tablet by mouth daily. Request for a 30 or 90 day supply?  Provider Discretion    Pharmacy:     Other Comments:

## 2021-09-29 RX ORDER — BUMETANIDE 1 MG/1
1 TABLET ORAL DAILY
Qty: 30 TABLET | Refills: 6 | Status: SHIPPED | OUTPATIENT
Start: 2021-09-29 | End: 2022-04-21

## 2021-10-13 ENCOUNTER — OFFICE VISIT (OUTPATIENT)
Dept: CARDIOLOGY CLINIC | Age: 66
End: 2021-10-13
Payer: MEDICARE

## 2021-10-13 VITALS
HEART RATE: 81 BPM | OXYGEN SATURATION: 98 % | WEIGHT: 169 LBS | DIASTOLIC BLOOD PRESSURE: 64 MMHG | BODY MASS INDEX: 30.91 KG/M2 | RESPIRATION RATE: 16 BRPM | SYSTOLIC BLOOD PRESSURE: 119 MMHG | TEMPERATURE: 97.7 F

## 2021-10-13 DIAGNOSIS — I42.9 CARDIOMYOPATHY, UNSPECIFIED TYPE (HCC): Primary | ICD-10-CM

## 2021-10-13 PROCEDURE — G8427 DOCREV CUR MEDS BY ELIG CLIN: HCPCS | Performed by: INTERNAL MEDICINE

## 2021-10-13 PROCEDURE — G9899 SCRN MAM PERF RSLTS DOC: HCPCS | Performed by: INTERNAL MEDICINE

## 2021-10-13 PROCEDURE — G8510 SCR DEP NEG, NO PLAN REQD: HCPCS | Performed by: INTERNAL MEDICINE

## 2021-10-13 PROCEDURE — 1090F PRES/ABSN URINE INCON ASSESS: CPT | Performed by: INTERNAL MEDICINE

## 2021-10-13 PROCEDURE — 99214 OFFICE O/P EST MOD 30 MIN: CPT | Performed by: INTERNAL MEDICINE

## 2021-10-13 PROCEDURE — G8399 PT W/DXA RESULTS DOCUMENT: HCPCS | Performed by: INTERNAL MEDICINE

## 2021-10-13 PROCEDURE — G8536 NO DOC ELDER MAL SCRN: HCPCS | Performed by: INTERNAL MEDICINE

## 2021-10-13 PROCEDURE — 3017F COLORECTAL CA SCREEN DOC REV: CPT | Performed by: INTERNAL MEDICINE

## 2021-10-13 PROCEDURE — G8417 CALC BMI ABV UP PARAM F/U: HCPCS | Performed by: INTERNAL MEDICINE

## 2021-10-13 PROCEDURE — 1101F PT FALLS ASSESS-DOCD LE1/YR: CPT | Performed by: INTERNAL MEDICINE

## 2021-10-13 NOTE — PROGRESS NOTES
Sunday Hahn presents today for   Chief Complaint   Patient presents with    Surgical Clearance     colonoscopy       Sunday Hahn preferred language for health care discussion is english/other. Personal Protective Equipment:   Personal Protective Equipment was used including: mask-surgical and hands-gloves. Patient was placed on no precaution(s). Patient was masked. Precautions:   Patient currently on None  Patient currently roomed with door closed    Is someone accompanying this pt? no    Is the patient using any DME equipment during OV? Yes hearing aids    Depression Screening:  3 most recent PHQ Screens 10/13/2021   Little interest or pleasure in doing things Not at all   Feeling down, depressed, irritable, or hopeless Not at all   Total Score PHQ 2 0       Learning Assessment:  Learning Assessment 6/20/2016   PRIMARY LEARNER Patient   PRIMARY LANGUAGE ENGLISH   LEARNER PREFERENCE PRIMARY OTHER (COMMENT)   ANSWERED BY Patient   RELATIONSHIP SELF       Abuse Screening:  No flowsheet data found. Fall Risk  Fall Risk Assessment, last 12 mths 10/13/2021   Able to walk? Yes   Fall in past 12 months? -   Do you feel unsteady? -   Are you worried about falling -       Pt currently taking Anticoagulant therapy? no  Coordination of Care:  1. Have you been to the ER, urgent care clinic since your last visit? Hospitalized since your last visit? yes    2. Have you seen or consulted any other health care providers outside of the [address] [place] [address] since your last visit? Include any pap smears or colon screening.   no

## 2021-10-23 NOTE — PROGRESS NOTES
Subjective:      Gabriela Santamaria is in the office today for cardiac reevaluation. She is a 68-year-old woman with hypertension, history of atrial flutter with symptomatic bradycardia, and prior atrial flutter ablation, as well as permanent pacemaker implantation in March of 2015. She was admitted to [facility] in 06/2017 with increasing shortness of breath and chest pressure. She was felt to have acute on chronic systolic failure. Her failure resolved fairly quickly with intravenous diuretics with a normalization of her chest x-ray, as well as her BNP. In January and early February of 2018, she began to experience more SOB and was once again  found to be in atrial fibrillation. She was evaluated by Dr Pearl Savage and there were plans for a possible second ablative attempt. However, JINA demonstrated a possible clot in the LA appendage. Since her EF remained at 30% for > 3 months on appropriate therapy, her pacemaker was upgraded to a BiV AICD. In the office on 4/24/2019, she reported that she was doing well. Her breathing had been good which she attributed to the Symbicort. She was not having chest pain. She  had some minimal swelling in her lower extremities. She continued to be very cognizant of her salt intake. In the office today, she reports that she is doing \"good \". Her shortness of breath \"comes and goes \". She started using her Symbicort twice daily and thinks that that has helped with her breathing. She is scheduled for colonoscopy.   Patient Active Problem List    Diagnosis Date Noted    S/P biventricular cardiac pacemaker procedure 05/14/2018    AICD (automatic cardioverter/defibrillator) present 05/14/2018    JOSÉ MIGUEL (obstructive sleep apnea) 08/14/2017    Acute on chronic systolic congestive heart failure ([facility] [address] 07/20/2017    Bronchiolitis 07/20/2017    Chest pain 07/19/2017    Hypertension     Dyslipidemia     Atrial flutter       Current Outpatient Medications Medication Sig Dispense Refill    bumetanide (BUMEX) 1 mg tablet Take 1 Tablet by mouth daily. 30 Tablet 6    atorvastatin (LIPITOR) 20 mg tablet TAKE 1 TABLET BY MOUTH ONCE DAILY 90 Tablet 3    budesonide-formoteroL (Symbicort) 160-4.5 mcg/actuation HFAA Take 2 Puffs by inhalation.  carvediloL (COREG) 6.25 mg tablet TAKE 1 TABLET BY MOUTH TWICE DAILY WITH MEALS 180 Tab 4    albuterol (PROVENTIL HFA, VENTOLIN HFA, PROAIR HFA) 90 mcg/actuation inhaler Take 2 Puffs by inhalation every four (4) hours as needed for Wheezing. 1 Inhaler 0    Eliquis 5 mg tablet TAKE 1 TABLET BY MOUTH TWICE DAILY 180 Tab 3    losartan (COZAAR) 25 mg tablet take 1 tablet by mouth once daily  0    buPROPion XL (WELLBUTRIN XL) 150 mg tablet Take 150 mg by mouth every morning.  multivitamin, stress formula (STRESS TAB) tablet Take 1 Tab by mouth daily.  cholecalciferol (VITAMIN D3) 1,000 unit tablet Take  by mouth daily.  omega-3 fatty acids-vitamin e (FISH OIL) 1,000 mg cap Take 1 Cap by mouth.  Desvenlafaxine SR (PRISTIQ) 50 mg tablet Take 50 mg by mouth daily. Indications: GENERALIZED ANXIETY DISORDER       No Known Allergies  Past Medical History:   Diagnosis Date    Atrial flutter     CHADS score 1  (-CHF, +HTN, -AGE, -DM, -CVA)    Atrial flutter ablation     3/13    Cardiomyopathy ([facility] [address]     LVEF 30% (03/17)    Dyslipidemia     Hypertension     Obstructive sleep apnea     CPAP    Pacemaker     3/13 ([employer])     Past Surgical History:   Procedure Laterality Date    HX HYSTERECTOMY      ?  years ago    HX OOPHORECTOMY       Family History   Problem Relation Age of Onset    Breast Cancer Sister 59     Social History     Tobacco Use   Smoking Status Never Smoker   Smokeless Tobacco Never Used          Review of Systems, additional:  Constitutional: negative  Eyes: negative  Respiratory: negative  Cardiovascular: negative  Gastrointestinal: negative  Musculoskeletal:negative  Neurological: negative  Behvioral/Psych: negative  Endocrine: negative  ENT: negative    Objective:     Visit Vitals  /64   Pulse 81   Temp 97.7 °F (36.5 °C) (Temporal)   Resp 16   Wt 76.7 kg (169 lb)   SpO2 98%   BMI 30.91 kg/m²     General:  alert, cooperative, no distress   Chest Wall: inspection normal - no chest wall deformities or tenderness, respiratory effort normal   Lung: clear to auscultation bilaterally   Heart:  normal rate and regular rhythm, no murmurs noted, no gallops noted   Abdomen: soft, non-tender. Bowel sounds normal. No masses,  no organomegaly   Extremities: extremities normal, atraumatic, no cyanosis or edema Skin: no rashes   Neuro: alert, oriented, normal speech, no focal findings or movement disorder noted         Assessment/Plan:       ICD-10-CM ICD-9-CM    1. Acute on chronic systolic congestive heart failure ([address] Echocardiogram completed 4/21/2021. EF 45 to 50%. Grade 2 diastolic dysfunction. Mild TR. Return in 6 months I50.23 428.23      428.0    2. Typical atrial flutter ([address] S/P ablation 03/2013 with return of atrial dysrhythmia. CHADS score 1. Plan was to repeat Echo which demonstrated an EF of 35% and normal LA size. Second ablative procedure considered but patient had atrial appendage clot and worsened LV function with EF 30%. BiV AICD placed 4/24/2018. Continued stable course. Echo repeated on 4/21/2021. EF is 45 to 50%. There was grade 2 diastolic dysfunction. Return at previously scheduled December appointment. Continue present cardiac Rx. I48.3 427.32    3. Hypertension,  controlled in office today. I11.9 402.10    4. Biventricular AICD   in situ, [employer] 5/14/2018. Last interrogation 9/14/2021. No therapy. Longevity 3.2 years      5. Chest pain, unspecified type, none recently R07.9 786.50    6. Dyslipidemia LDL is 100 on 9/24/2019. PCP follows E78.5 272.4    7. JOSÉ MIGUEL (obstructive sleep apnea) G47.33 327.23    8. Bronchiectasis, high resolution CT 11/18/2019; mild central bronchiectasis. Followed by Dr. Martha Ferrari.    9       COVID-19 infection, she and her  both infected 2020  10     Preprocedure clearance, stable from cardiac perspective. No cardiac contraindication for upcoming colonoscopy.

## 2021-12-21 ENCOUNTER — CLINICAL SUPPORT (OUTPATIENT)
Dept: CARDIOLOGY CLINIC | Age: 66
End: 2021-12-21
Payer: MEDICARE

## 2021-12-21 DIAGNOSIS — I42.9 CARDIOMYOPATHY, UNSPECIFIED TYPE (HCC): Primary | ICD-10-CM

## 2021-12-21 DIAGNOSIS — I50.23 ACUTE ON CHRONIC SYSTOLIC CONGESTIVE HEART FAILURE (HCC): ICD-10-CM

## 2021-12-21 DIAGNOSIS — Z95.810 AICD (AUTOMATIC CARDIOVERTER/DEFIBRILLATOR) PRESENT: ICD-10-CM

## 2021-12-21 PROCEDURE — 93284 PRGRMG EVAL IMPLANTABLE DFB: CPT | Performed by: INTERNAL MEDICINE

## 2021-12-31 RX ORDER — APIXABAN 5 MG/1
TABLET, FILM COATED ORAL
Qty: 180 TABLET | Refills: 3 | Status: SHIPPED | OUTPATIENT
Start: 2021-12-31

## 2022-01-05 ENCOUNTER — HOSPITAL ENCOUNTER (OUTPATIENT)
Dept: WOMENS IMAGING | Age: 67
Discharge: HOME | End: 2022-01-05
Attending: FAMILY MEDICINE
Payer: MEDICARE

## 2022-01-05 DIAGNOSIS — Z12.31 ENCOUNTER FOR SCREENING MAMMOGRAM FOR BREAST CANCER: ICD-10-CM

## 2022-01-05 PROCEDURE — 77063 BREAST TOMOSYNTHESIS BI: CPT

## 2022-01-19 ENCOUNTER — TELEPHONE (OUTPATIENT)
Dept: CARDIOLOGY CLINIC | Age: 67
End: 2022-01-19

## 2022-01-19 ENCOUNTER — OFFICE VISIT (OUTPATIENT)
Dept: CARDIOLOGY CLINIC | Age: 67
End: 2022-01-19
Payer: MEDICARE

## 2022-01-19 VITALS
DIASTOLIC BLOOD PRESSURE: 63 MMHG | RESPIRATION RATE: 16 BRPM | SYSTOLIC BLOOD PRESSURE: 99 MMHG | BODY MASS INDEX: 31.83 KG/M2 | HEART RATE: 68 BPM | OXYGEN SATURATION: 99 % | WEIGHT: 174 LBS | TEMPERATURE: 97.3 F

## 2022-01-19 DIAGNOSIS — I48.3 TYPICAL ATRIAL FLUTTER (HCC): Primary | ICD-10-CM

## 2022-01-19 PROCEDURE — 1101F PT FALLS ASSESS-DOCD LE1/YR: CPT | Performed by: INTERNAL MEDICINE

## 2022-01-19 PROCEDURE — G8510 SCR DEP NEG, NO PLAN REQD: HCPCS | Performed by: INTERNAL MEDICINE

## 2022-01-19 PROCEDURE — 99214 OFFICE O/P EST MOD 30 MIN: CPT | Performed by: INTERNAL MEDICINE

## 2022-01-19 PROCEDURE — G8417 CALC BMI ABV UP PARAM F/U: HCPCS | Performed by: INTERNAL MEDICINE

## 2022-01-19 PROCEDURE — G8427 DOCREV CUR MEDS BY ELIG CLIN: HCPCS | Performed by: INTERNAL MEDICINE

## 2022-01-19 PROCEDURE — 1090F PRES/ABSN URINE INCON ASSESS: CPT | Performed by: INTERNAL MEDICINE

## 2022-01-19 PROCEDURE — G9899 SCRN MAM PERF RSLTS DOC: HCPCS | Performed by: INTERNAL MEDICINE

## 2022-01-19 PROCEDURE — G8399 PT W/DXA RESULTS DOCUMENT: HCPCS | Performed by: INTERNAL MEDICINE

## 2022-01-19 PROCEDURE — 3017F COLORECTAL CA SCREEN DOC REV: CPT | Performed by: INTERNAL MEDICINE

## 2022-01-19 PROCEDURE — G8536 NO DOC ELDER MAL SCRN: HCPCS | Performed by: INTERNAL MEDICINE

## 2022-01-19 NOTE — PROGRESS NOTES
Identified pt with two pt identifiers(name and ). Reviewed record in preparation for visit and have obtained necessary documentation. Martinez Mantlila presents today for   Chief Complaint   Patient presents with    Follow-up     6m       Pt c/o SOB. Martinez Mantilla preferred language for health care discussion is english/other. Personal Protective Equipment:   Personal Protective Equipment was used including: mask-surgical and hands-gloves. Patient was placed on no precaution(s). Patient was masked. Precautions:   Patient currently on None  Patient currently roomed with door closed. Is someone accompanying this pt? no    Is the patient using any DME equipment during [address]? no    Depression Screening:  3 most recent PHQ Screens 2022   Little interest or pleasure in doing things Not at all   Feeling down, depressed, irritable, or hopeless Not at all   Total Score PHQ 2 0       Learning Assessment:  Learning Assessment 2016   PRIMARY LEARNER Patient   PRIMARY LANGUAGE ENGLISH   LEARNER PREFERENCE PRIMARY OTHER (COMMENT)   ANSWERED BY Patient   RELATIONSHIP SELF       Abuse Screening:  No flowsheet data found. Fall Risk  Fall Risk Assessment, last 12 mths 2022   Able to walk? Yes   Fall in past 12 months? -   Do you feel unsteady? -   Are you worried about falling -       Pt currently taking Anticoagulant therapy? yes  Pt currently taking Antiplatelet therapy? no    Coordination of Care:  1. Have you been to the ER, urgent care clinic since your last visit? Hospitalized since your last visit? no    2. Have you seen or consulted any other health care providers outside of the [address] [place] [address] since your last visit? Include any pap smears or colon screening. no      Please see Red banners under Allergies and Med Rec to remove outside inquires. All correct information has been verified with patient and added to chart.      Medication's patient's would liked removed has been marked not taking to be removed per Verbal order and read back per Britni Blair MD

## 2022-01-29 NOTE — PROGRESS NOTES
Subjective:      Rachel Connolly is in the office today for cardiac reevaluation. She is a 57-year-old woman with hypertension, history of atrial flutter with symptomatic bradycardia, and prior atrial flutter ablation, as well as permanent pacemaker implantation in March of 2015. She was admitted to [facility] in 06/2017 with increasing shortness of breath and chest pressure. She was felt to have acute on chronic systolic failure. Her failure resolved fairly quickly with intravenous diuretics with a normalization of her chest x-ray, as well as her BNP. In January and early February of 2018, she began to experience more SOB and was once again  found to be in atrial fibrillation. She was evaluated by Dr Emerald Carroll and there were plans for a possible second ablative attempt. However, JINA demonstrated a possible clot in the LA appendage. Since her EF remained at 30% for > 3 months on appropriate therapy, her pacemaker was upgraded to a BiV AICD. In the office today, she reports that she may be experiencing more shortness of breath lately. She has a Spiriva inhaler but has not used it on a daily basis in the past.  She finds that she does use it often, her breathing is better. She reports having recent abnormal labs with a plan for a repeat by Dr. Delphine Bledsoe. Patient Active Problem List    Diagnosis Date Noted    S/P biventricular cardiac pacemaker procedure 05/14/2018    AICD (automatic cardioverter/defibrillator) present 05/14/2018    JOSÉ MIGUEL (obstructive sleep apnea) 08/14/2017    Acute on chronic systolic congestive heart failure ([facility] [address] 07/20/2017    Bronchiolitis 07/20/2017    Chest pain 07/19/2017    Hypertension     Dyslipidemia     Atrial flutter       Current Outpatient Medications   Medication Sig Dispense Refill    tiotropium (Spiriva with HandiHaler) 18 mcg inhalation capsule Take 1 Capsule by inhalation daily.       Eliquis 5 mg tablet TAKE 1 TABLET BY MOUTH TWICE DAILY 180 Tablet 3    bumetanide (BUMEX) 1 mg tablet Take 1 Tablet by mouth daily. 30 Tablet 6    atorvastatin (LIPITOR) 20 mg tablet TAKE 1 TABLET BY MOUTH ONCE DAILY 90 Tablet 3    budesonide-formoteroL (Symbicort) 160-4.5 mcg/actuation HFAA Take 2 Puffs by inhalation.  carvediloL (COREG) 6.25 mg tablet TAKE 1 TABLET BY MOUTH TWICE DAILY WITH MEALS 180 Tab 4    albuterol (PROVENTIL HFA, VENTOLIN HFA, PROAIR HFA) 90 mcg/actuation inhaler Take 2 Puffs by inhalation every four (4) hours as needed for Wheezing. 1 Inhaler 0    losartan (COZAAR) 25 mg tablet take 1 tablet by mouth once daily  0    buPROPion XL (WELLBUTRIN XL) 150 mg tablet Take 150 mg by mouth every morning.  multivitamin, stress formula (STRESS TAB) tablet Take 1 Tab by mouth daily.  cholecalciferol (VITAMIN D3) 1,000 unit tablet Take  by mouth daily.  omega-3 fatty acids-vitamin e (FISH OIL) 1,000 mg cap Take 1 Cap by mouth.  Desvenlafaxine SR (PRISTIQ) 50 mg tablet Take 50 mg by mouth daily. Indications: GENERALIZED ANXIETY DISORDER       No Known Allergies  Past Medical History:   Diagnosis Date    Atrial flutter     CHADS score 1  (-CHF, +HTN, -AGE, -DM, -CVA)    Atrial flutter ablation     3/13    Cardiomyopathy ([facility] [address]     LVEF 30% (03/17)    Dyslipidemia     Hypertension     Obstructive sleep apnea     CPAP    Pacemaker     3/13 ([employer])     Past Surgical History:   Procedure Laterality Date    HX HYSTERECTOMY      ?  years ago    HX OOPHORECTOMY       Family History   Problem Relation Age of Onset    Breast Cancer Sister 59     Social History     Tobacco Use   Smoking Status Never Smoker   Smokeless Tobacco Never Used          Review of Systems, additional:  Constitutional: negative  Eyes: negative  Respiratory: negative  Cardiovascular: negative  Gastrointestinal: negative  Musculoskeletal:negative  Neurological: negative  Behvioral/Psych: negative  Endocrine: negative  ENT: negative    Objective: Visit Vitals  BP 99/63   Pulse 68   Temp 97.3 °F (36.3 °C) (Temporal)   Resp 16   Wt 78.9 kg (174 lb)   SpO2 99%   BMI 31.83 kg/m²     General:  alert, cooperative, no distress   Chest Wall: inspection normal - no chest wall deformities or tenderness, respiratory effort normal   Lung: clear to auscultation bilaterally   Heart:  normal rate and regular rhythm, no murmurs noted, no gallops noted   Abdomen: soft, non-tender. Bowel sounds normal. No masses,  no organomegaly   Extremities: extremities normal, atraumatic, no cyanosis or edema Skin: no rashes   Neuro: alert, oriented, normal speech, no focal findings or movement disorder noted         Assessment/Plan:       ICD-10-CM ICD-9-CM    1. Acute on chronic systolic congestive heart failure ([address] Echocardiogram completed 4/21/2021. EF 45 to 50%. Grade 2 diastolic dysfunction. Mild TR. I50.23 428.23      428.0    2. Typical atrial flutter ([address] S/P ablation 03/2013 with return of atrial dysrhythmia. CHADS score 1. Plan was to repeat Echo which demonstrated an EF of 35% and normal LA size. Second ablative procedure considered but patient had atrial appendage clot and worsened LV function with EF 30%. BiV AICD placed 4/24/2018. Continued stable course. Echo repeated on 4/21/2021. EF is 45 to 50%. There was grade 2 diastolic dysfunction. Continue present cardiac Rx. Return in 3 months. I48.3 427.32    3. Hypertension, low BP in the office today. Asymptomatic. I11.9 402.10    4. Biventricular AICD   in situ, [employer] 5/14/2018. Last interrogation available 9/14/2021. No therapy. Longevity 3.2 years. Followed by Dr. Tova Osman. 5. Chest pain, unspecified type, none recently R07.9 786.50    6. Dyslipidemia LDL is 100 on 9/24/2019. PCP follows E78.5 272.4    7. JOSÉ MIGUEL (obstructive sleep apnea) G47.33 327.23    8. Bronchiectasis, high resolution CT 11/18/2019; mild central bronchiectasis. Followed by Dr. Leander Olmedo.   Patient reports does better with daily use of her Spiriva  9       COVID-19 infection, 2020

## 2022-03-16 ENCOUNTER — OFFICE VISIT (OUTPATIENT)
Dept: CARDIOLOGY CLINIC | Age: 67
End: 2022-03-16
Payer: MEDICARE

## 2022-03-16 DIAGNOSIS — I42.9 CARDIOMYOPATHY, UNSPECIFIED TYPE (HCC): Primary | ICD-10-CM

## 2022-03-16 DIAGNOSIS — Z95.810 AICD (AUTOMATIC CARDIOVERTER/DEFIBRILLATOR) PRESENT: ICD-10-CM

## 2022-03-16 DIAGNOSIS — I48.3 TYPICAL ATRIAL FLUTTER (HCC): ICD-10-CM

## 2022-03-16 PROCEDURE — 93295 DEV INTERROG REMOTE 1/2/MLT: CPT | Performed by: INTERNAL MEDICINE

## 2022-03-18 PROBLEM — R07.9 CHEST PAIN: Status: ACTIVE | Noted: 2017-07-19

## 2022-03-19 PROBLEM — J21.9 BRONCHIOLITIS: Status: ACTIVE | Noted: 2017-07-20

## 2022-03-19 PROBLEM — Z95.810 AICD (AUTOMATIC CARDIOVERTER/DEFIBRILLATOR) PRESENT: Status: ACTIVE | Noted: 2018-05-14

## 2022-03-19 PROBLEM — G47.33 OSA (OBSTRUCTIVE SLEEP APNEA): Status: ACTIVE | Noted: 2017-08-14

## 2022-03-19 PROBLEM — I50.23 ACUTE ON CHRONIC SYSTOLIC CONGESTIVE HEART FAILURE (HCC): Status: ACTIVE | Noted: 2017-07-20

## 2022-03-20 PROBLEM — Z95.0 S/P BIVENTRICULAR CARDIAC PACEMAKER PROCEDURE: Status: ACTIVE | Noted: 2018-05-14

## 2022-03-22 NOTE — PROGRESS NOTES
BIV AICD. 2.3 years left on battery. Lead impedance and threshold WNL. A sensed 99(in afib and on eliquis)  %, V paced 84 %. OP Vol WNL. 0 events.  Dr. Alyssia Toro patient

## 2022-03-31 RX ORDER — CARVEDILOL 6.25 MG/1
TABLET ORAL
Qty: 180 TABLET | Refills: 4 | Status: SHIPPED | OUTPATIENT
Start: 2022-03-31

## 2022-04-08 NOTE — PROGRESS NOTES
I have personally seen and evaluated the device findings. Interrogation reviewed and I agree with assessment.     Blanca Maldonado

## 2022-04-21 RX ORDER — BUMETANIDE 1 MG/1
1 TABLET ORAL DAILY
Qty: 30 TABLET | Refills: 6 | Status: SHIPPED | OUTPATIENT
Start: 2022-04-21

## 2022-04-29 ENCOUNTER — OFFICE VISIT (OUTPATIENT)
Dept: CARDIOLOGY CLINIC | Age: 67
End: 2022-04-29
Payer: MEDICARE

## 2022-04-29 VITALS
BODY MASS INDEX: 32.01 KG/M2 | OXYGEN SATURATION: 97 % | SYSTOLIC BLOOD PRESSURE: 122 MMHG | WEIGHT: 175 LBS | TEMPERATURE: 97.6 F | DIASTOLIC BLOOD PRESSURE: 70 MMHG | HEART RATE: 89 BPM

## 2022-04-29 DIAGNOSIS — I42.9 CARDIOMYOPATHY, UNSPECIFIED TYPE (HCC): Primary | ICD-10-CM

## 2022-04-29 PROCEDURE — G8399 PT W/DXA RESULTS DOCUMENT: HCPCS | Performed by: INTERNAL MEDICINE

## 2022-04-29 PROCEDURE — G9899 SCRN MAM PERF RSLTS DOC: HCPCS | Performed by: INTERNAL MEDICINE

## 2022-04-29 PROCEDURE — 99214 OFFICE O/P EST MOD 30 MIN: CPT | Performed by: INTERNAL MEDICINE

## 2022-04-29 PROCEDURE — 1101F PT FALLS ASSESS-DOCD LE1/YR: CPT | Performed by: INTERNAL MEDICINE

## 2022-04-29 PROCEDURE — G8510 SCR DEP NEG, NO PLAN REQD: HCPCS | Performed by: INTERNAL MEDICINE

## 2022-04-29 PROCEDURE — 3017F COLORECTAL CA SCREEN DOC REV: CPT | Performed by: INTERNAL MEDICINE

## 2022-04-29 PROCEDURE — 1090F PRES/ABSN URINE INCON ASSESS: CPT | Performed by: INTERNAL MEDICINE

## 2022-04-29 PROCEDURE — G8536 NO DOC ELDER MAL SCRN: HCPCS | Performed by: INTERNAL MEDICINE

## 2022-04-29 PROCEDURE — G8427 DOCREV CUR MEDS BY ELIG CLIN: HCPCS | Performed by: INTERNAL MEDICINE

## 2022-04-29 PROCEDURE — G8417 CALC BMI ABV UP PARAM F/U: HCPCS | Performed by: INTERNAL MEDICINE

## 2022-04-29 RX ORDER — SERTRALINE HYDROCHLORIDE 50 MG/1
50 TABLET, FILM COATED ORAL DAILY
COMMUNITY

## 2022-04-29 NOTE — PROGRESS NOTES
Identified pt with two pt identifiers(name and ). Reviewed record in preparation for visit and have obtained necessary documentation. Ramona Vergara presents today for   Chief Complaint   Patient presents with    Follow-up       Pt denies DIZZINESS, SOB, CHEST PAIN/ PRESSURE, FATIGUE/WEAKNESS, HEADACHES, SWELLING. Ramona Vergara preferred language for health care discussion is english/other. Personal Protective Equipment:   Personal Protective Equipment was used including: mask-surgical and hands-gloves. Patient was placed on no precaution(s). Patient was masked. Precautions:   Patient currently on None  Patient currently roomed with door closed. Is someone accompanying this pt?no     Is the patient using any DME equipment during OV? No     Depression Screening:  3 most recent PHQ Screens 2022   Little interest or pleasure in doing things Not at all   Feeling down, depressed, irritable, or hopeless Not at all   Total Score PHQ 2 0       Learning Assessment:  Learning Assessment 2016   PRIMARY LEARNER Patient   PRIMARY LANGUAGE ENGLISH   LEARNER PREFERENCE PRIMARY OTHER (COMMENT)   ANSWERED BY Patient   RELATIONSHIP SELF       Abuse Screening:  No flowsheet data found. Fall Risk  Fall Risk Assessment, last 12 mths 2022   Able to walk? Yes   Fall in past 12 months? -   Do you feel unsteady? -   Are you worried about falling -       Pt currently taking Anticoagulant therapy? yes  Pt currently taking Antiplatelet therapy? No   Coordination of Care:  1. Have you been to the ER, urgent care clinic since your last visit? Hospitalized since your last visit? No     2. Have you seen or consulted any other health care providers outside of the [address] [place] [address] since your last visit? Include any pap smears or colon screening. No     Please see Red banners under Allergies and Med Rec to remove outside inquires.  All correct information has been verified with patient and added to chart.      Medication's patient's would liked removed has been marked not taking to be removed per Verbal order and read back per Char Zheng MD

## 2022-05-06 NOTE — PROGRESS NOTES
Subjective:      Zuhair Britt is in the office today for cardiac reevaluation. She is a 69-year-old woman with hypertension, history of atrial flutter with symptomatic bradycardia, and prior atrial flutter ablation, as well as permanent pacemaker implantation in March of 2015. She was admitted to [facility] [place] [facility]\"" in 06/2017 with increasing shortness of breath and chest pressure. She was felt to have acute on chronic systolic failure. Her failure resolved fairly quickly with intravenous diuretics with a normalization of her chest x-ray, as well as her BNP. In January and early February of 2018, she began to experience more SOB and was once again  found to be in atrial fibrillation. She was evaluated by Dr Radhika Marti and there were plans for a possible second ablative attempt. However, JINA demonstrated a possible clot in the LA appendage. Since her EF remained at 30% for > 3 months on appropriate therapy, her pacemaker was upgraded to a BiV AICD. In the office today, she reports that her breathing has been [place] \". She does experience dyspnea on exertion. She stated that walking at a steady pace for any length of time is difficult. She does not believe she would walk a block. Recently, she started walking on a treadmill and she is also doing yoga. She has some lower back issues. Dr. Matthew Curry follows her laboratory data. Patient Active Problem List    Diagnosis Date Noted    S/P biventricular cardiac pacemaker procedure 05/14/2018    AICD (automatic cardioverter/defibrillator) present 05/14/2018    JOSÉ MIGUEL (obstructive sleep apnea) 08/14/2017    Acute on chronic systolic congestive heart failure ([facility] [address] 07/20/2017    Bronchiolitis 07/20/2017    Chest pain 07/19/2017    Hypertension     Dyslipidemia     Atrial flutter       Current Outpatient Medications   Medication Sig Dispense Refill    sertraline (Zoloft) 50 mg tablet Take 75 mg by mouth daily.       bumetanide (BUMEX) 1 mg tablet TAKE 1 TABLET BY MOUTH DAILY 30 Tablet 6    carvediloL (COREG) 6.25 mg tablet TAKE 1 TABLET BY MOUTH TWICE DAILY WITH MEALS 180 Tablet 4    tiotropium (Spiriva with HandiHaler) 18 mcg inhalation capsule Take 1 Capsule by inhalation daily.  Eliquis 5 mg tablet TAKE 1 TABLET BY MOUTH TWICE DAILY 180 Tablet 3    atorvastatin (LIPITOR) 20 mg tablet TAKE 1 TABLET BY MOUTH ONCE DAILY 90 Tablet 3    budesonide-formoteroL (Symbicort) 160-4.5 mcg/actuation HFAA Take 2 Puffs by inhalation.  albuterol (PROVENTIL HFA, VENTOLIN HFA, PROAIR HFA) 90 mcg/actuation inhaler Take 2 Puffs by inhalation every four (4) hours as needed for Wheezing. 1 Inhaler 0    losartan (COZAAR) 25 mg tablet take 1 tablet by mouth once daily  0    buPROPion XL (WELLBUTRIN XL) 150 mg tablet Take 150 mg by mouth every morning.  multivitamin, stress formula (STRESS TAB) tablet Take 1 Tab by mouth daily.  cholecalciferol (VITAMIN D3) 1,000 unit tablet Take  by mouth daily.  omega-3 fatty acids-vitamin e (FISH OIL) 1,000 mg cap Take 1 Cap by mouth. No Known Allergies  Past Medical History:   Diagnosis Date    Atrial flutter     CHADS score 1  (-CHF, +HTN, -AGE, -DM, -CVA)    Atrial flutter ablation     3/13    Cardiomyopathy ([facility] [address]     LVEF 30% (03/17)    Dyslipidemia     Hypertension     Obstructive sleep apnea     CPAP    Pacemaker     3/13 ([employer])     Past Surgical History:   Procedure Laterality Date    HX HYSTERECTOMY      ?  years ago    HX OOPHORECTOMY       Family History   Problem Relation Age of Onset    Breast Cancer Sister 59     Social History     Tobacco Use   Smoking Status Never Smoker   Smokeless Tobacco Never Used          Review of Systems, additional:  Constitutional: negative  Eyes: negative  Respiratory: negative  Cardiovascular: negative  Gastrointestinal: negative  Musculoskeletal:negative  Neurological: negative  Behvioral/Psych: negative  Endocrine: negative  ENT: negative    Objective:     Visit Vitals  /70   Pulse 89   Temp 97.6 °F (36.4 °C) (Temporal)   Wt 79.4 kg (175 lb)   SpO2 97%   BMI 32.01 kg/m²     General:  alert, cooperative, no distress   Chest Wall: inspection normal - no chest wall deformities or tenderness, respiratory effort normal   Lung: clear to auscultation bilaterally   Heart:  normal rate and regular rhythm, no murmurs noted, no gallops noted   Abdomen: soft, non-tender. Bowel sounds normal. No masses,  no organomegaly   Extremities: extremities normal, atraumatic, no cyanosis or edema Skin: no rashes   Neuro: alert, oriented, normal speech, no focal findings or movement disorder noted         Assessment/Plan:       ICD-10-CM ICD-9-CM    1. Acute on chronic systolic congestive heart failure ([address] Echocardiogram completed 4/21/2021. EF 45 to 50%. Grade 2 diastolic dysfunction. Mild TR. I50.23 428.23      428.0    2. Typical atrial flutter ([address] S/P ablation 03/2013 with return of atrial dysrhythmia. CHADS score 1. Plan was to repeat Echo which demonstrated an EF of 35% and normal LA size. Second ablative procedure considered but patient had atrial appendage clot and worsened LV function with EF 30%. BiV AICD placed 4/24/2018. Continued stable course. Echo repeated on 4/21/2021. EF is 45 to 50%. There was grade 2 diastolic dysfunction. Continue present cardiac Rx. Return in 6 months. I48.3 427.32    3. Hypertension, controlled BP in the office today. I11.9 402.10    4. Biventricular AICD  in situ, [employer] 5/14/2018. Last interrogation available 12/21/2021. No therapy. Longevity 2.9 years. Followed by Dr. Ted Gonzalez. 5. Chest pain, unspecified type, none recently R07.9 786.50    6. Dyslipidemia LDL is 100 on 9/24/2019. Dr. Belkys Meléndez follows E78.5 272.4    7. JOSÉ MIGUEL (obstructive sleep apnea) G47.33 327.23    8. Bronchiectasis, high resolution CT 11/18/2019; mild central bronchiectasis. Followed by Dr. Tank Lynn.   Patient reports does better with daily use of her Spiriva  9       COVID-19 infection, 2020

## 2022-06-22 ENCOUNTER — OFFICE VISIT (OUTPATIENT)
Dept: CARDIOLOGY CLINIC | Age: 67
End: 2022-06-22
Payer: MEDICARE

## 2022-06-22 DIAGNOSIS — I42.9 CARDIOMYOPATHY, UNSPECIFIED TYPE (HCC): Primary | ICD-10-CM

## 2022-06-22 DIAGNOSIS — Z95.810 AICD (AUTOMATIC CARDIOVERTER/DEFIBRILLATOR) PRESENT: ICD-10-CM

## 2022-06-22 PROCEDURE — 93295 DEV INTERROG REMOTE 1/2/MLT: CPT | Performed by: INTERNAL MEDICINE

## 2022-08-28 RX ORDER — ATORVASTATIN CALCIUM 20 MG/1
TABLET, FILM COATED ORAL
Qty: 90 TABLET | Refills: 3 | Status: SHIPPED | OUTPATIENT
Start: 2022-08-28

## 2022-09-28 ENCOUNTER — OFFICE VISIT (OUTPATIENT)
Dept: CARDIOLOGY CLINIC | Age: 67
End: 2022-09-28
Payer: MEDICARE

## 2022-09-28 DIAGNOSIS — I42.9 CARDIOMYOPATHY, UNSPECIFIED TYPE (HCC): Primary | ICD-10-CM

## 2022-09-28 DIAGNOSIS — Z95.810 AICD (AUTOMATIC CARDIOVERTER/DEFIBRILLATOR) PRESENT: ICD-10-CM

## 2022-09-28 PROCEDURE — 93296 REM INTERROG EVL PM/IDS: CPT | Performed by: INTERNAL MEDICINE

## 2022-09-28 PROCEDURE — 93295 DEV INTERROG REMOTE 1/2/MLT: CPT | Performed by: INTERNAL MEDICINE

## 2022-10-05 NOTE — PROGRESS NOTES
I have personally seen and evaluated the device findings. Interrogation reviewed and I agree with assessment.     Tc Mcdermott

## 2022-10-31 ENCOUNTER — OFFICE VISIT (OUTPATIENT)
Dept: CARDIOLOGY CLINIC | Age: 67
End: 2022-10-31
Payer: MEDICARE

## 2022-10-31 VITALS
DIASTOLIC BLOOD PRESSURE: 90 MMHG | SYSTOLIC BLOOD PRESSURE: 128 MMHG | WEIGHT: 175 LBS | OXYGEN SATURATION: 99 % | HEART RATE: 79 BPM | BODY MASS INDEX: 32.01 KG/M2

## 2022-10-31 DIAGNOSIS — Z95.0 S/P BIVENTRICULAR CARDIAC PACEMAKER PROCEDURE: Primary | ICD-10-CM

## 2022-10-31 PROCEDURE — 1101F PT FALLS ASSESS-DOCD LE1/YR: CPT | Performed by: INTERNAL MEDICINE

## 2022-10-31 PROCEDURE — 3017F COLORECTAL CA SCREEN DOC REV: CPT | Performed by: INTERNAL MEDICINE

## 2022-10-31 PROCEDURE — G8427 DOCREV CUR MEDS BY ELIG CLIN: HCPCS | Performed by: INTERNAL MEDICINE

## 2022-10-31 PROCEDURE — G8399 PT W/DXA RESULTS DOCUMENT: HCPCS | Performed by: INTERNAL MEDICINE

## 2022-10-31 PROCEDURE — G8510 SCR DEP NEG, NO PLAN REQD: HCPCS | Performed by: INTERNAL MEDICINE

## 2022-10-31 PROCEDURE — G8417 CALC BMI ABV UP PARAM F/U: HCPCS | Performed by: INTERNAL MEDICINE

## 2022-10-31 PROCEDURE — 1123F ACP DISCUSS/DSCN MKR DOCD: CPT | Performed by: INTERNAL MEDICINE

## 2022-10-31 PROCEDURE — G8536 NO DOC ELDER MAL SCRN: HCPCS | Performed by: INTERNAL MEDICINE

## 2022-10-31 PROCEDURE — G9899 SCRN MAM PERF RSLTS DOC: HCPCS | Performed by: INTERNAL MEDICINE

## 2022-10-31 PROCEDURE — 1090F PRES/ABSN URINE INCON ASSESS: CPT | Performed by: INTERNAL MEDICINE

## 2022-10-31 PROCEDURE — 99214 OFFICE O/P EST MOD 30 MIN: CPT | Performed by: INTERNAL MEDICINE

## 2022-10-31 NOTE — PROGRESS NOTES
Identified pt with two pt identifiers(name and ). Reviewed record in preparation for visit and have obtained necessary documentation. Lis Bates presents today for   Chief Complaint   Patient presents with    Follow-up     6m       Pt denied  DIZZINESS, SOB, CHEST PAIN/ PRESSURE, FATIGUE/WEAKNESS, HEADACHES, SWELLING. Lis Bates preferred language for health care discussion is english/other. Personal Protective Equipment:   Personal Protective Equipment was used including: mask-surgical and hands-gloves. Patient was placed on no precaution(s). Patient was masked. Precautions:   Patient currently on None  Patient currently roomed with door closed. Is someone accompanying this pt?     Is the patient using any DME equipment during [address]? Hearing aid    Depression Screening:  3 most recent PHQ Screens 10/31/2022   Little interest or pleasure in doing things Not at all   Feeling down, depressed, irritable, or hopeless Not at all   Total Score PHQ 2 0       Learning Assessment:  Learning Assessment 2016   PRIMARY LEARNER Patient   PRIMARY LANGUAGE ENGLISH   LEARNER PREFERENCE PRIMARY OTHER (COMMENT)   ANSWERED BY Patient   RELATIONSHIP SELF       Abuse Screening:  completed    Fall Risk  Fall Risk Assessment, last 12 mths 10/31/2022   Able to walk? Yes   Fall in past 12 months? -   Do you feel unsteady? -   Are you worried about falling -       Pt currently taking Anticoagulant therapy? no  Pt currently taking Antiplatelet therapy? no    Coordination of Care:  1. Have you been to the ER, urgent care clinic since your last visit? Hospitalized since your last visit? no    2. Have you seen or consulted any other health care providers outside of the [address] [place] [address] since your last visit? Include any pap smears or colon screening. no      Please see Red banners under Allergies and Med Rec to remove outside inquires.  All correct information has been verified with patient and added to chart.      Medication's patient's would liked removed has been marked not taking to be removed per Verbal order and read back per Cassidy Qiu MD

## 2022-11-06 NOTE — PROGRESS NOTES
Subjective:      Rachel Connolly is in the office today for cardiac reevaluation. She is a 47-year-old woman with hypertension, history of atrial flutter with symptomatic bradycardia, and prior atrial flutter ablation, as well as permanent pacemaker implantation in March of 2015. She was admitted to [facility] in 06/2017 with increasing shortness of breath and chest pressure. She was felt to have acute on chronic systolic failure. Her failure resolved fairly quickly with intravenous diuretics with a normalization of her chest x-ray, as well as her BNP. In January and early February of 2018, she began to experience more SOB and was once again was found to be in atrial fibrillation. She was evaluated by Dr Emerald Carroll and there were plans for a possible second ablative attempt. However, JINA demonstrated a possible clot in the LA appendage. Since her EF remained at 30% for > 3 months on appropriate therapy, her pacemaker was upgraded to a BiV AICD. In the office today, she reports that has been doing well. She does experience shortness of breath if she \"walks fast \". She has had no PND. She has had no orthopnea. Patient Active Problem List    Diagnosis Date Noted    S/P biventricular cardiac pacemaker procedure 05/14/2018    AICD (automatic cardioverter/defibrillator) present 05/14/2018    JOSÉ MIGUEL (obstructive sleep apnea) 08/14/2017    Acute on chronic systolic congestive heart failure ([facility] [address] 07/20/2017    Bronchiolitis 07/20/2017    Chest pain 07/19/2017    Hypertension     Dyslipidemia     Atrial flutter       Current Outpatient Medications   Medication Sig Dispense Refill    atorvastatin (LIPITOR) 20 mg tablet TAKE 1 TABLET BY MOUTH ONCE DAILY 90 Tablet 3    sertraline (ZOLOFT) 50 mg tablet Take 50 mg by mouth daily.       bumetanide (BUMEX) 1 mg tablet TAKE 1 TABLET BY MOUTH DAILY 30 Tablet 6    carvediloL (COREG) 6.25 mg tablet TAKE 1 TABLET BY MOUTH TWICE DAILY WITH MEALS 180 Tablet 4 Eliquis 5 mg tablet TAKE 1 TABLET BY MOUTH TWICE DAILY 180 Tablet 3    budesonide-formoteroL (SYMBICORT) 160-4.5 mcg/actuation HFAA Take 2 Puffs by inhalation. albuterol (PROVENTIL HFA, VENTOLIN HFA, PROAIR HFA) 90 mcg/actuation inhaler Take 2 Puffs by inhalation every four (4) hours as needed for Wheezing. 1 Inhaler 0    losartan (COZAAR) 25 mg tablet take 1 tablet by mouth once daily  0    buPROPion XL (WELLBUTRIN XL) 150 mg tablet Take 150 mg by mouth every morning.        multivitamin, stress formula (STRESS TAB) tablet Take 1 Tab by mouth daily. cholecalciferol (VITAMIN D3) (1000 Units /25 mcg) tablet Take  by mouth daily. omega-3 fatty acids-vitamin e 1,000 mg cap Take 1 Cap by mouth. No Known Allergies  Past Medical History:   Diagnosis Date    Atrial flutter     CHADS score 1  (-CHF, +HTN, -AGE, -DM, -CVA)    Atrial flutter ablation     3/13    Cardiomyopathy ([facility] [address]     LVEF 30% (03/17)    Dyslipidemia     Hypertension     Obstructive sleep apnea     CPAP    Pacemaker     3/13 ([employer])     Past Surgical History:   Procedure Laterality Date    HX HYSTERECTOMY      ?  years ago    HX OOPHORECTOMY       Family History   Problem Relation Age of Onset    Breast Cancer Sister 59     Social History     Tobacco Use   Smoking Status Never   Smokeless Tobacco Never          Review of Systems, additional:  Constitutional: negative  Eyes: negative  Respiratory: negative  Cardiovascular: negative  Gastrointestinal: negative  Musculoskeletal:negative  Neurological: negative  Behvioral/Psych: negative  Endocrine: negative  ENT: negative    Objective:     Visit Vitals  BP (!) 128/90   Pulse 79   Wt 79.4 kg (175 lb)   SpO2 99%   BMI 32.01 kg/m²     General:  alert, cooperative, no distress   Chest Wall: inspection normal - no chest wall deformities or tenderness, respiratory effort normal   Lung: clear to auscultation bilaterally   Heart:  normal rate and regular rhythm, no murmurs noted, no gallops noted   Abdomen: soft, non-tender. Bowel sounds normal. No masses,  no organomegaly   Extremities: extremities normal, atraumatic, no cyanosis or edema Skin: no rashes   Neuro: alert, oriented, normal speech, no focal findings or movement disorder noted         Assessment/Plan:       ICD-10-CM ICD-9-CM    1. Acute on chronic systolic congestive heart failure ([address] Echocardiogram completed 4/21/2021. EF 45 to 50%. Grade 2 diastolic dysfunction. Mild TR. I50.23 428.23      428.0    2. Typical atrial flutter ([address] S/P ablation 03/2013 with return of atrial dysrhythmia. CHADS score 1. Plan was to repeat Echo which demonstrated an EF of 35% and normal LA size. Second ablative procedure considered but patient had atrial appendage clot and worsened LV function with EF 30%. BiV AICD placed 4/24/2018. Continued stable course. Echo repeated on 4/21/2021. EF is 45 to 50%. There was grade 2 diastolic dysfunction. Continue present cardiac Rx. Return in 6 months. I48.3 427.32    3. Hypertension, controlled BP in the office today. I11.9 402.10    4. Biventricular AICD  in situ, [employer] 5/14/2018. Last interrogation available 9/28/2022. No therapy. Longevity 2.2 years. Followed by Dr. Antonio Victor. 5. Chest pain, unspecified type, none recently R07.9 786.50    6. Dyslipidemia   Dr. Marifer Kinsey follows E78.5 272.4    7. JOSÉ MIGUEL (obstructive sleep apnea) G47.33 327.23    8. Bronchiectasis, high resolution CT 11/18/2019; mild central bronchiectasis. Followed by Dr. Sheldon Jiménez.   Patient reports does better with daily use of her Spiriva  9       COVID-19 infection, 2020

## 2022-11-16 NOTE — TELEPHONE ENCOUNTER
PCP: Dennis Cintron MD    Last appt: 10/31/2022  Future Appointments   Date Time Provider Alyssa Nicholas   12/20/2022 10:30 AM CSI, PACER NORF [facility] BS AMB   1/9/2023  9:30 AM [facility] LENIN STEREO BX RM [address]   5/1/2023  1:40 PM Mirna Mohs, MD [facility] BS AMB       Requested Prescriptions     Pending Prescriptions Disp Refills    bumetanide (BUMEX) 1 mg tablet 30 Tablet 6     Sig: Take 1 Tablet by mouth daily.

## 2022-11-17 RX ORDER — BUMETANIDE 1 MG/1
1 TABLET ORAL DAILY
Qty: 30 TABLET | Refills: 6 | Status: SHIPPED | OUTPATIENT
Start: 2022-11-17

## 2022-12-20 ENCOUNTER — CLINICAL SUPPORT (OUTPATIENT)
Dept: CARDIOLOGY CLINIC | Age: 67
End: 2022-12-20
Payer: MEDICARE

## 2022-12-20 DIAGNOSIS — I50.23 ACUTE ON CHRONIC SYSTOLIC CONGESTIVE HEART FAILURE (HCC): ICD-10-CM

## 2022-12-20 DIAGNOSIS — Z95.810 AICD (AUTOMATIC CARDIOVERTER/DEFIBRILLATOR) PRESENT: ICD-10-CM

## 2022-12-20 DIAGNOSIS — I42.9 CARDIOMYOPATHY, UNSPECIFIED TYPE (HCC): Primary | ICD-10-CM

## 2022-12-20 PROCEDURE — 93289 INTERROG DEVICE EVAL HEART: CPT | Performed by: INTERNAL MEDICINE

## 2022-12-26 RX ORDER — APIXABAN 5 MG/1
TABLET, FILM COATED ORAL
Qty: 180 TABLET | Refills: 3 | Status: SHIPPED | OUTPATIENT
Start: 2022-12-26

## 2023-01-09 ENCOUNTER — HOSPITAL ENCOUNTER (OUTPATIENT)
Dept: WOMENS IMAGING | Age: 68
Discharge: HOME | End: 2023-01-09
Attending: FAMILY MEDICINE
Payer: MEDICARE

## 2023-01-09 DIAGNOSIS — Z12.31 ENCOUNTER FOR SCREENING MAMMOGRAM FOR BREAST CANCER: ICD-10-CM

## 2023-01-09 PROCEDURE — 77063 BREAST TOMOSYNTHESIS BI: CPT

## 2023-02-01 NOTE — PROGRESS NOTES
I have personally seen and evaluated the device findings. Interrogation reviewed and I agree with assessment.     Rosendo Peters

## 2023-03-29 ENCOUNTER — OFFICE VISIT (OUTPATIENT)
Age: 68
End: 2023-03-29

## 2023-03-29 DIAGNOSIS — Z95.810 PRESENCE OF AUTOMATIC (IMPLANTABLE) CARDIAC DEFIBRILLATOR: ICD-10-CM

## 2023-03-29 DIAGNOSIS — I42.9 CARDIOMYOPATHY, UNSPECIFIED TYPE (HCC): Primary | ICD-10-CM

## 2023-04-07 NOTE — PROGRESS NOTES
[facility] :  BIV AICD. 1.9 years left on battery. Lead impedance and threshold WNL. Afib 100 %, V paced 83.2 %. OP Vol WNL. 0 episodes, Dr Cleopatra Marvin patient is on eliquis.

## 2023-06-11 RX ORDER — BUMETANIDE 1 MG/1
TABLET ORAL
Qty: 30 TABLET | OUTPATIENT
Start: 2023-06-11

## 2023-06-19 ENCOUNTER — TELEPHONE (OUTPATIENT)
Age: 68
End: 2023-06-19

## 2023-06-19 NOTE — TELEPHONE ENCOUNTER
PCP: José Miguel Chan MD    Last appt: [unfilled]  Future Appointments   Date Time Provider Karina Devi   6/28/2023  2:20 PM Shira Ashley DO [facility] BS AMB   9/20/2023  3:30 PM CSI, LEXII [facility] BS AMB   12/20/2023  1:45 PM CSI, GALILEAR [facility] BS AMB   1/15/2024  9:30 AM [address] [place] [address] 1 [address] [facility]   3/20/2024  1:00 PM CSI, LEXII [facility] BS AMB       Requested Prescriptions     Pending Prescriptions Disp Refills    atorvastatin (LIPITOR) 20 MG tablet 30 tablet 0     Sig: Take 1 tablet by mouth daily    bumetanide (BUMEX) 1 MG tablet 30 tablet 0     Sig: Take 1 tablet by mouth daily

## 2023-06-19 NOTE — TELEPHONE ENCOUNTER
Refill on atorvastatin (LIPITOR) 20 MG tablet     bumetanide (BUMEX) 1 MG tablet   Had an appt on wed 6/21 but provider is not in office adri o 6/28 w/dr. Vicente Verduzco

## 2023-06-20 RX ORDER — ATORVASTATIN CALCIUM 20 MG/1
20 TABLET, FILM COATED ORAL DAILY
Qty: 30 TABLET | Refills: 0 | Status: SHIPPED | OUTPATIENT
Start: 2023-06-20

## 2023-06-20 RX ORDER — BUMETANIDE 1 MG/1
1 TABLET ORAL DAILY
Qty: 30 TABLET | Refills: 0 | Status: SHIPPED | OUTPATIENT
Start: 2023-06-20

## 2023-06-23 RX ORDER — CARVEDILOL 6.25 MG/1
TABLET ORAL
Qty: 180 TABLET | Refills: 3 | Status: SHIPPED | OUTPATIENT
Start: 2023-06-23

## 2023-07-18 RX ORDER — BUMETANIDE 1 MG/1
1 TABLET ORAL DAILY
Qty: 30 TABLET | Refills: 6 | Status: SHIPPED | OUTPATIENT
Start: 2023-07-18

## 2023-07-26 RX ORDER — ATORVASTATIN CALCIUM 20 MG/1
20 TABLET, FILM COATED ORAL DAILY
Qty: 90 TABLET | Refills: 3 | Status: SHIPPED | OUTPATIENT
Start: 2023-07-26

## 2023-08-14 ENCOUNTER — OFFICE VISIT (OUTPATIENT)
Age: 68
End: 2023-08-14

## 2023-08-14 VITALS
HEART RATE: 104 BPM | WEIGHT: 143 LBS | OXYGEN SATURATION: 98 % | HEIGHT: 62 IN | BODY MASS INDEX: 26.31 KG/M2 | DIASTOLIC BLOOD PRESSURE: 70 MMHG | SYSTOLIC BLOOD PRESSURE: 128 MMHG

## 2023-08-14 DIAGNOSIS — I48.3 TYPICAL ATRIAL FLUTTER (HCC): Primary | ICD-10-CM

## 2023-08-14 DIAGNOSIS — I42.9 CARDIOMYOPATHY, UNSPECIFIED TYPE (HCC): ICD-10-CM

## 2023-08-14 ASSESSMENT — PATIENT HEALTH QUESTIONNAIRE - PHQ9
2. FEELING DOWN, DEPRESSED OR HOPELESS: 0
SUM OF ALL RESPONSES TO PHQ QUESTIONS 1-9: 0
SUM OF ALL RESPONSES TO PHQ9 QUESTIONS 1 & 2: 0
SUM OF ALL RESPONSES TO PHQ QUESTIONS 1-9: 0
SUM OF ALL RESPONSES TO PHQ QUESTIONS 1-9: 0
1. LITTLE INTEREST OR PLEASURE IN DOING THINGS: 0
SUM OF ALL RESPONSES TO PHQ QUESTIONS 1-9: 0

## 2023-08-14 NOTE — PROGRESS NOTES
Susanna Rose presents today for   Chief Complaint   Patient presents with    Follow-up     6 month       Susanna Calixtos preferred language for health care discussion is english/other. Is someone accompanying this pt? no    Is the patient using any DME equipment during OV? no    Depression Screening:  Depression: Not at risk    PHQ-2 Score: 0        Learning Assessment:  Who is the primary learner? Patient    What is the preferred language for health care of the primary learner? ENGLISH    How does the primary learner prefer to learn new concepts? DEMONSTRATION    Answered By patient    Relationship to Learner SELF           Pt currently taking Anticoagulant therapy? Eliquis 5 mg    Pt currently taking Antiplatelet therapy ? no      Coordination of Care:  1. Have you been to the ER, urgent care clinic since your last visit? Hospitalized since your last visit? no    2. Have you seen or consulted any other health care providers outside of the [address] [place] [address] since your last visit? Include any pap smears or colon screening.  no

## 2023-08-14 NOTE — PATIENT INSTRUCTIONS
Testing   Echo- the  will call you to schedule appointment      **call office 3-5 days after testing is completed for results** Please ensure testing is completed prior to scheduled follow up appointment.  If it is not completed your appointment may be rescheduled**

## 2023-08-25 ENCOUNTER — NURSE ONLY (OUTPATIENT)
Age: 68
End: 2023-08-25
Payer: MEDICARE

## 2023-08-25 DIAGNOSIS — I48.3 TYPICAL ATRIAL FLUTTER (HCC): ICD-10-CM

## 2023-08-25 DIAGNOSIS — I42.9 CARDIOMYOPATHY, UNSPECIFIED TYPE (HCC): Primary | ICD-10-CM

## 2023-08-25 DIAGNOSIS — Z95.810 PRESENCE OF AUTOMATIC (IMPLANTABLE) CARDIAC DEFIBRILLATOR: ICD-10-CM

## 2023-09-12 ENCOUNTER — HOSPITAL ENCOUNTER (OUTPATIENT)
Facility: HOSPITAL | Age: 68
Discharge: HOME | End: 2023-09-14
Attending: INTERNAL MEDICINE
Payer: MEDICARE

## 2023-09-12 VITALS
WEIGHT: 143 LBS | BODY MASS INDEX: 26.31 KG/M2 | HEIGHT: 62 IN | DIASTOLIC BLOOD PRESSURE: 70 MMHG | SYSTOLIC BLOOD PRESSURE: 128 MMHG

## 2023-09-12 DIAGNOSIS — I48.3 TYPICAL ATRIAL FLUTTER (HCC): ICD-10-CM

## 2023-09-12 DIAGNOSIS — I42.9 CARDIOMYOPATHY, UNSPECIFIED TYPE (HCC): ICD-10-CM

## 2023-09-12 LAB
ECHO AO ASC DIAM: 2.6 CM
ECHO AO ASCENDING AORTA INDEX: 1.57 CM/M2
ECHO AO ROOT DIAM: 3.1 CM
ECHO AO ROOT INDEX: 1.87 CM/M2
ECHO BSA: 1.68 M2
ECHO EST RA PRESSURE: 8 MMHG
ECHO LA VOL 2C: 66 ML (ref 22–52)
ECHO LA VOL 4C: 57 ML (ref 22–52)
ECHO LA VOL BP: 57 ML (ref 22–52)
ECHO LA VOL/BSA BIPLANE: 34 ML/M2 (ref 16–34)
ECHO LA VOLUME AREA LENGTH: 61 ML
ECHO LA VOLUME INDEX A2C: 40 ML/M2 (ref 16–34)
ECHO LA VOLUME INDEX A4C: 34 ML/M2 (ref 16–34)
ECHO LA VOLUME INDEX AREA LENGTH: 37 ML/M2 (ref 16–34)
ECHO LV E' LATERAL VELOCITY: 9 CM/S
ECHO LV E' SEPTAL VELOCITY: 4 CM/S
ECHO LV FRACTIONAL SHORTENING: 24 % (ref 28–44)
ECHO LV INTERNAL DIMENSION DIASTOLE INDEX: 2.77 CM/M2
ECHO LV INTERNAL DIMENSION DIASTOLIC: 4.6 CM (ref 3.9–5.3)
ECHO LV INTERNAL DIMENSION SYSTOLIC INDEX: 2.11 CM/M2
ECHO LV INTERNAL DIMENSION SYSTOLIC: 3.5 CM
ECHO LV IVSD: 1.1 CM (ref 0.6–0.9)
ECHO LV MASS 2D: 158.8 G (ref 67–162)
ECHO LV MASS INDEX 2D: 95.7 G/M2 (ref 43–95)
ECHO LV POSTERIOR WALL DIASTOLIC: 0.9 CM (ref 0.6–0.9)
ECHO LV RELATIVE WALL THICKNESS RATIO: 0.39
ECHO LVOT AREA: 2.8 CM2
ECHO LVOT DIAM: 1.9 CM
ECHO LVOT MEAN GRADIENT: 2 MMHG
ECHO LVOT PEAK GRADIENT: 4 MMHG
ECHO LVOT PEAK VELOCITY: 1 M/S
ECHO LVOT STROKE VOLUME INDEX: 32.8 ML/M2
ECHO LVOT SV: 54.4 ML
ECHO LVOT VTI: 19.2 CM
ECHO MV A VELOCITY: 0.29 M/S
ECHO MV E DECELERATION TIME (DT): 121.8 MS
ECHO MV E VELOCITY: 1.16 M/S
ECHO MV E/A RATIO: 4
ECHO MV E/E' LATERAL: 12.89
ECHO MV E/E' RATIO (AVERAGED): 20.94
ECHO MV E/E' SEPTAL: 29
ECHO RV BASAL DIMENSION: 3.2 CM
ECHO RV FREE WALL PEAK S': 9 CM/S
ECHO RV TAPSE: 2 CM (ref 1.7–?)

## 2023-09-12 PROCEDURE — 93306 TTE W/DOPPLER COMPLETE: CPT

## 2023-09-25 ENCOUNTER — TELEPHONE (OUTPATIENT)
Age: 68
End: 2023-09-25

## 2023-09-25 NOTE — TELEPHONE ENCOUNTER
Called the patient to inform her of the results of the cardiac testing that she had done. Had to leave voicemail. Verbal order and read back per Kyung Essex, MD  The echocardiogram is essentially unchanged. We will plan to continue the same cardiac treatment.

## 2023-09-25 NOTE — TELEPHONE ENCOUNTER
----- Message from Gema Choudhary MD sent at 9/25/2023 11:08 AM EDT -----  The echocardiogram is essentially unchanged. We will plan to continue the same cardiac treatment.  ----- Message -----  From: Courtney Montague MA  Sent: 9/14/2023   1:25 PM EDT  To: Gema Choudhary MD    Per your last note \"  Typical atrial flutter ([address]), S/P ablation 03/2013 with return of atrial dysrhythmia. CHADS score 1. Plan was to repeat Echo which demonstrated an EF of 35% and normal LA size. Second ablative procedure considered but patient had atrial appendage clot and worsened LV function with EF 30%. BiV AICD placed 4/24/2018. Continued stable course. Echo repeated on 4/21/2021. EF is 45 to 50%. There was grade 2 diastolic dysfunction. Continue present cardiac Rx.   Will order echocardiogram.  Return in 6 weeks

## 2023-09-26 PROCEDURE — 93289 INTERROG DEVICE EVAL HEART: CPT | Performed by: INTERNAL MEDICINE

## 2023-10-02 NOTE — TELEPHONE ENCOUNTER
Had to leave voicemail for the patient to call the office.  This is the second call, letter going out

## 2023-11-28 ENCOUNTER — TELEPHONE (OUTPATIENT)
Age: 68
End: 2023-11-28

## 2023-11-28 NOTE — TELEPHONE ENCOUNTER
Office received cardiac clearance for the patient.  Joni herrera/Jody, 01-, with Dr. Harini Renee.  Discussed this with Dr. Ricci since Dr. Enrique is out of the office.    Verbal order and read back per Charlie Ricci MD  Patient is low to moderate risk.      Called the office [phone] to see if patient needs to hold Eliquis.  Left message with the call center, waiting for the nurse to call me back.

## 2023-12-20 ENCOUNTER — OFFICE VISIT (OUTPATIENT)
Age: 68
End: 2023-12-20
Payer: MEDICARE

## 2023-12-20 DIAGNOSIS — I42.9 CARDIOMYOPATHY, UNSPECIFIED TYPE (HCC): Primary | ICD-10-CM

## 2023-12-20 DIAGNOSIS — Z95.810 PRESENCE OF AUTOMATIC (IMPLANTABLE) CARDIAC DEFIBRILLATOR: ICD-10-CM

## 2024-01-02 PROCEDURE — 93295 DEV INTERROG REMOTE 1/2/MLT: CPT | Performed by: INTERNAL MEDICINE

## 2024-01-02 PROCEDURE — 93296 REM INTERROG EVL PM/IDS: CPT | Performed by: INTERNAL MEDICINE

## 2024-01-02 NOTE — PROGRESS NOTES
[facility] :  BIV AICD. 17 months left on battery. Lead impedance and threshold WNL. Afib sensed 99 %,patient is on Eliquis,  BIV paced 83 %. OP Vol back to baseline. 0 episodes

## 2024-01-04 NOTE — RESULT ENCOUNTER NOTE
Device check personally reviewed by me.  Normal device function on interrogation.  See scanned interrogation document for complete details.

## 2024-01-15 ENCOUNTER — HOSPITAL ENCOUNTER (OUTPATIENT)
Dept: WOMENS IMAGING | Facility: HOSPITAL | Age: 69
Discharge: HOME | End: 2024-01-18
Payer: MEDICARE

## 2024-01-15 DIAGNOSIS — Z12.31 VISIT FOR SCREENING MAMMOGRAM: ICD-10-CM

## 2024-01-15 PROCEDURE — 77063 BREAST TOMOSYNTHESIS BI: CPT

## 2024-03-20 ENCOUNTER — NURSE ONLY (OUTPATIENT)
Age: 69
End: 2024-03-20

## 2024-03-20 DIAGNOSIS — Z95.810 PRESENCE OF AUTOMATIC (IMPLANTABLE) CARDIAC DEFIBRILLATOR: Primary | ICD-10-CM

## 2024-03-20 DIAGNOSIS — I42.9 CARDIOMYOPATHY, UNSPECIFIED TYPE (HCC): ICD-10-CM

## 2024-04-23 RX ORDER — BUMETANIDE 1 MG/1
1 TABLET ORAL DAILY
Qty: 30 TABLET | Refills: 6 | Status: SHIPPED | OUTPATIENT
Start: 2024-04-23

## 2024-05-10 ENCOUNTER — TELEPHONE (OUTPATIENT)
Age: 69
End: 2024-05-10

## 2024-05-10 NOTE — TELEPHONE ENCOUNTER
During appt reminder call, patient advised she completed an Echocardiogram last year and has been trying to speak to someone who can explain the results. Patient has reviewed results via [employer] but is unfamiliar with the terminology and would like a call from someone who can explain the information to her. Patient can be reached at [phone].   Oral Minoxidil Pregnancy And Lactation Text: This medication should only be used when clearly needed if you are pregnant, attempting to become pregnant or breast feeding.

## 2024-05-13 NOTE — TELEPHONE ENCOUNTER
Per previous notes, attempts were made to reach patient regarding results, but they were unsuccessful. At that time the following was noted regarding her echocardiogram:    \"The echocardiogram is essentially unchanged.  We will plan to continue the same cardiac treatment.\"    This has been fully explained to the patient, who indicates understanding.

## 2024-06-10 ENCOUNTER — TELEPHONE (OUTPATIENT)
Age: 69
End: 2024-06-10

## 2024-06-10 NOTE — TELEPHONE ENCOUNTER
Patient called complaining of dyspnea with minimal exertion, such as walking 15-20 steps. She stated that it resolves with rest. She also stated that she has edema of the legs and ankles bilaterally. She denied chest pain and palpitations. She stated that she does not weigh herself daily and that she has been consuming more salty foods than normal the past few days. She drinks approximately 50 oz of fluid daily and takes bumex 1 mg once daily. She missed a dose of bumex on Saturday due to being out of town.    Advised patient to go to the hospital for immediate treatment and evaluation. Educated patient on daily weights, fluid restriction, and limiting consumption of salty foods. She verbalized understanding. Patient has follow up appointment with Dr. Arriola on 6/19/2024.

## 2024-06-17 RX ORDER — CARVEDILOL 6.25 MG/1
TABLET ORAL
Qty: 180 TABLET | Refills: 3 | Status: SHIPPED | OUTPATIENT
Start: 2024-06-17

## 2024-06-19 ENCOUNTER — OFFICE VISIT (OUTPATIENT)
Age: 69
End: 2024-06-19
Payer: MEDICARE

## 2024-06-19 VITALS
OXYGEN SATURATION: 95 % | WEIGHT: 182 LBS | BODY MASS INDEX: 33.49 KG/M2 | HEIGHT: 62 IN | SYSTOLIC BLOOD PRESSURE: 108 MMHG | DIASTOLIC BLOOD PRESSURE: 62 MMHG | HEART RATE: 78 BPM

## 2024-06-19 DIAGNOSIS — I42.9 CARDIOMYOPATHY, UNSPECIFIED TYPE (HCC): ICD-10-CM

## 2024-06-19 DIAGNOSIS — Z95.810 PRESENCE OF AUTOMATIC (IMPLANTABLE) CARDIAC DEFIBRILLATOR: ICD-10-CM

## 2024-06-19 DIAGNOSIS — I48.3 TYPICAL ATRIAL FLUTTER (HCC): Primary | ICD-10-CM

## 2024-06-19 PROCEDURE — 1036F TOBACCO NON-USER: CPT | Performed by: INTERNAL MEDICINE

## 2024-06-19 PROCEDURE — G8399 PT W/DXA RESULTS DOCUMENT: HCPCS | Performed by: INTERNAL MEDICINE

## 2024-06-19 PROCEDURE — 1123F ACP DISCUSS/DSCN MKR DOCD: CPT | Performed by: INTERNAL MEDICINE

## 2024-06-19 PROCEDURE — G8417 CALC BMI ABV UP PARAM F/U: HCPCS | Performed by: INTERNAL MEDICINE

## 2024-06-19 PROCEDURE — 99215 OFFICE O/P EST HI 40 MIN: CPT | Performed by: INTERNAL MEDICINE

## 2024-06-19 PROCEDURE — 3017F COLORECTAL CA SCREEN DOC REV: CPT | Performed by: INTERNAL MEDICINE

## 2024-06-19 PROCEDURE — 1090F PRES/ABSN URINE INCON ASSESS: CPT | Performed by: INTERNAL MEDICINE

## 2024-06-19 PROCEDURE — G8427 DOCREV CUR MEDS BY ELIG CLIN: HCPCS | Performed by: INTERNAL MEDICINE

## 2024-06-19 RX ORDER — FUROSEMIDE 40 MG/1
40 TABLET ORAL 2 TIMES DAILY
COMMUNITY
Start: 2024-06-18

## 2024-06-19 ASSESSMENT — PATIENT HEALTH QUESTIONNAIRE - PHQ9
SUM OF ALL RESPONSES TO PHQ QUESTIONS 1-9: 0
SUM OF ALL RESPONSES TO PHQ QUESTIONS 1-9: 0
1. LITTLE INTEREST OR PLEASURE IN DOING THINGS: NOT AT ALL
SUM OF ALL RESPONSES TO PHQ9 QUESTIONS 1 & 2: 0
SUM OF ALL RESPONSES TO PHQ QUESTIONS 1-9: 0
2. FEELING DOWN, DEPRESSED OR HOPELESS: NOT AT ALL
SUM OF ALL RESPONSES TO PHQ QUESTIONS 1-9: 0

## 2024-06-19 NOTE — PROGRESS NOTES
Susanna Seymour presents today for   Chief Complaint   Patient presents with    Follow-up     10 month f/u     Follow-Up from Hospital     Hosp f/u 6/11-6/18 due to CHF    Shortness of Breath     SOB with exertion        Susanna Seymour preferred language for health care discussion is english/other.    Is someone accompanying this pt? yes    Is the patient using any DME equipment during OV? no    Depression Screening:  Depression: Not at risk (6/19/2024)    PHQ-2     PHQ-2 Score: 0        Learning Assessment:  Who is the primary learner? Patient    What is the preferred language for health care of the primary learner? ENGLISH    How does the primary learner prefer to learn new concepts? DEMONSTRATION    Answered By patient    Relationship to Learner SELF           Pt currently taking Anticoagulant therapy? Eliquis 5 mg 2x daily     Pt currently taking Antiplatelet therapy ? no      Coordination of Care:  1. Have you been to the ER, urgent care clinic since your last visit? Hospitalized since your last visit? yes    2. Have you seen or consulted any other health care providers outside of the [facility] since your last visit? Include any pap smears or colon screening. no    
status:      Spouse name: Not on file    Number of children: Not on file    Years of education: Not on file    Highest education level: Not on file   Occupational History    Not on file   Tobacco Use    Smoking status: Never    Smokeless tobacco: Never   Vaping Use    Vaping Use: Not on file   Substance and Sexual Activity    Alcohol use: Yes    Drug use: No    Sexual activity: Not on file   Other Topics Concern    Not on file   Social History Narrative    Not on file     Social Determinants of Health     Financial Resource Strain: Not on file   Food Insecurity: Not on file   Transportation Needs: Not on file   Physical Activity: Not on file   Stress: Not on file   Social Connections: Not on file   Intimate Partner Violence: Not on file   Housing Stability: Not on file        The patient has a family history of    Review of Systems    14 pt Review of Systems is negative unless otherwise mentioned in the HPI.    Wt Readings from Last 3 Encounters:   06/19/24 82.6 kg (182 lb)   09/12/23 64.9 kg (143 lb)   08/14/23 64.9 kg (143 lb)     Temp Readings from Last 3 Encounters:   No data found for Temp     BP Readings from Last 3 Encounters:   06/19/24 108/62   09/12/23 128/70   08/14/23 128/70     Pulse Readings from Last 3 Encounters:   06/19/24 78   08/14/23 (!) 104   10/31/22 79       No valid procedures specified.    Physical Exam:    /62 (Site: Left Upper Arm, Position: Sitting, Cuff Size: Large Adult)   Pulse 78   Ht 1.575 m (5' 2\")   Wt 82.6 kg (182 lb)   SpO2 95%   BMI 33.29 kg/m²    Physical Exam  Vitals and nursing note reviewed.   Constitutional:       General: She is not in acute distress.     Appearance: Normal appearance.   HENT:      Head: Normocephalic.      Nose: Nose normal.      Mouth/Throat:      Mouth: Mucous membranes are moist.   Eyes:      Extraocular Movements: Extraocular movements intact.      Pupils: Pupils are equal, round, and reactive to light.   Neck:      Vascular: No

## 2024-06-19 NOTE — PATIENT INSTRUCTIONS
Referral to [facility] - they will contact you for a date and time for an appointment       F/U with Connie Beverly MD  ( [facility])   [address], [place] [address]  Phone: [phone]

## 2024-06-26 ENCOUNTER — NURSE ONLY (OUTPATIENT)
Age: 69
End: 2024-06-26
Payer: MEDICARE

## 2024-06-26 DIAGNOSIS — I42.9 CARDIOMYOPATHY, UNSPECIFIED TYPE (HCC): ICD-10-CM

## 2024-06-26 DIAGNOSIS — Z95.810 PRESENCE OF AUTOMATIC (IMPLANTABLE) CARDIAC DEFIBRILLATOR: Primary | ICD-10-CM

## 2024-06-26 PROCEDURE — 93295 DEV INTERROG REMOTE 1/2/MLT: CPT | Performed by: INTERNAL MEDICINE

## 2024-06-26 PROCEDURE — 93296 REM INTERROG EVL PM/IDS: CPT | Performed by: INTERNAL MEDICINE

## 2024-07-12 ENCOUNTER — OFFICE VISIT (OUTPATIENT)
Age: 69
End: 2024-07-12
Payer: MEDICARE

## 2024-07-12 VITALS
DIASTOLIC BLOOD PRESSURE: 66 MMHG | HEIGHT: 62 IN | BODY MASS INDEX: 31.83 KG/M2 | SYSTOLIC BLOOD PRESSURE: 111 MMHG | OXYGEN SATURATION: 100 % | HEART RATE: 62 BPM | WEIGHT: 173 LBS

## 2024-07-12 DIAGNOSIS — I42.9 CARDIOMYOPATHY, UNSPECIFIED TYPE (HCC): Primary | ICD-10-CM

## 2024-07-12 DIAGNOSIS — Z95.810 AICD (AUTOMATIC CARDIOVERTER/DEFIBRILLATOR) PRESENT: ICD-10-CM

## 2024-07-12 DIAGNOSIS — I50.23 ACUTE ON CHRONIC SYSTOLIC (CONGESTIVE) HEART FAILURE (HCC): ICD-10-CM

## 2024-07-12 PROBLEM — I50.9 CONGESTIVE HEART FAILURE (HCC): Status: ACTIVE | Noted: 2024-06-11

## 2024-07-12 PROCEDURE — 1123F ACP DISCUSS/DSCN MKR DOCD: CPT | Performed by: INTERNAL MEDICINE

## 2024-07-12 PROCEDURE — G8399 PT W/DXA RESULTS DOCUMENT: HCPCS | Performed by: INTERNAL MEDICINE

## 2024-07-12 PROCEDURE — 99214 OFFICE O/P EST MOD 30 MIN: CPT | Performed by: INTERNAL MEDICINE

## 2024-07-12 PROCEDURE — G8417 CALC BMI ABV UP PARAM F/U: HCPCS | Performed by: INTERNAL MEDICINE

## 2024-07-12 PROCEDURE — 3017F COLORECTAL CA SCREEN DOC REV: CPT | Performed by: INTERNAL MEDICINE

## 2024-07-12 PROCEDURE — 1090F PRES/ABSN URINE INCON ASSESS: CPT | Performed by: INTERNAL MEDICINE

## 2024-07-12 PROCEDURE — G8428 CUR MEDS NOT DOCUMENT: HCPCS | Performed by: INTERNAL MEDICINE

## 2024-07-12 PROCEDURE — 1036F TOBACCO NON-USER: CPT | Performed by: INTERNAL MEDICINE

## 2024-07-12 ASSESSMENT — PATIENT HEALTH QUESTIONNAIRE - PHQ9
SUM OF ALL RESPONSES TO PHQ9 QUESTIONS 1 & 2: 0
SUM OF ALL RESPONSES TO PHQ QUESTIONS 1-9: 0
2. FEELING DOWN, DEPRESSED OR HOPELESS: NOT AT ALL
SUM OF ALL RESPONSES TO PHQ QUESTIONS 1-9: 0
SUM OF ALL RESPONSES TO PHQ QUESTIONS 1-9: 0
1. LITTLE INTEREST OR PLEASURE IN DOING THINGS: NOT AT ALL
SUM OF ALL RESPONSES TO PHQ QUESTIONS 1-9: 0

## 2024-07-12 NOTE — PROGRESS NOTES
Susanna Seymour presents today for   Chief Complaint   Patient presents with    Follow-Up from Hospital       Susanna Seymour preferred language for health care discussion is english/other.    Is someone accompanying this pt? no    Is the patient using any DME equipment during OV? no    Depression Screening:  Depression: Not at risk (7/12/2024)    PHQ-2     PHQ-2 Score: 0        Learning Assessment:  Who is the primary learner? Patient    What is the preferred language for health care of the primary learner? ENGLISH    How does the primary learner prefer to learn new concepts? DEMONSTRATION    Answered By patient    Relationship to Learner SELF           Pt currently taking Anticoagulant therapy? eliquis    Pt currently taking Antiplatelet therapy ? no      Coordination of Care:  1. Have you been to the ER, urgent care clinic since your last visit? Hospitalized since your last visit? no    2. Have you seen or consulted any other health care providers outside of the [facility] since your last visit? Include any pap smears or colon screening. no

## 2024-07-13 RX ORDER — CARVEDILOL 6.25 MG/1
6.25 TABLET ORAL 2 TIMES DAILY WITH MEALS
Qty: 180 TABLET | Refills: 3 | Status: SHIPPED | OUTPATIENT
Start: 2024-07-13

## 2024-07-13 RX ORDER — ATORVASTATIN CALCIUM 20 MG/1
20 TABLET, FILM COATED ORAL DAILY
Qty: 90 TABLET | Refills: 3 | Status: SHIPPED | OUTPATIENT
Start: 2024-07-13

## 2024-07-13 RX ORDER — FUROSEMIDE 40 MG/1
40 TABLET ORAL 2 TIMES DAILY
Qty: 60 TABLET | Refills: 5 | Status: SHIPPED | OUTPATIENT
Start: 2024-07-13

## 2024-07-13 RX ORDER — LOSARTAN POTASSIUM 25 MG/1
25 TABLET ORAL DAILY
Qty: 90 TABLET | Refills: 2 | Status: SHIPPED | OUTPATIENT
Start: 2024-07-13

## 2024-07-13 NOTE — PROGRESS NOTES
Subjective:      Susanna Seymour is in the office today for cardiac reevaluation.     She is a 67-year-old woman with hypertension, history of atrial flutter with symptomatic bradycardia, and prior atrial flutter ablation, as well as pacemaker implantation in March of 2015.      She was admitted to [facility] in 06/2017 with increasing shortness of breath and chest pressure.  She was felt to have acute on chronic systolic failure.  Her failure resolved fairly quickly with intravenous diuretics with a normalization of her chest x-ray, as well as her BNP.    In January and early February of 2018, she began to experience more SOB and was once again was found to be in atrial fibrillation. She was evaluated by Dr Jones and there were plans for a possible second ablative attempt.  However, AMBAR demonstrated a possible clot in the LA appendage. Since her EF remained at 30% for > 3 months on appropriate therapy, her pacemaker was upgraded to a BiV AICD.    In the office today, she reports that she has been hospitalized recently for decompensated heart failure.  She had a transesophageal echo which demonstrated a that was ordered reduced ejection fraction of only 15% .  She also had heart catheterization done by Dr. Greene.  His findings were patent coronaries at mildly elevated right-sided filling pressures.  She was subsequently cardioverted into sinus rhythm on 6/17/2024.  In the office today she reports she feels much better.  She has been weighing every day and her ideal weight has been approximately 173 pounds.      Patient Active Problem List    Diagnosis Date Noted    S/P biventricular cardiac pacemaker procedure 05/14/2018    AICD (automatic cardioverter/defibrillator) present 05/14/2018    DIANA (obstructive sleep apnea) 08/14/2017    Acute on chronic systolic congestive heart failure (HCC) 07/20/2017    Bronchiolitis 07/20/2017    Chest pain 07/19/2017    Hypertension     Dyslipidemia     Atrial

## 2024-07-22 NOTE — RESULT ENCOUNTER NOTE
Device check personally reviewed by me.  Battery life less than 1 year, please recheck in 3 months, otherwise normal device function on interrogation.  See scanned interrogation document for complete details.

## 2024-10-08 RX ORDER — BUMETANIDE 1 MG/1
1 TABLET ORAL DAILY
Qty: 30 TABLET | Refills: 6 | OUTPATIENT
Start: 2024-10-08

## 2024-10-11 RX ORDER — BUMETANIDE 1 MG/1
1 TABLET ORAL DAILY
Qty: 30 TABLET | Refills: 6 | OUTPATIENT
Start: 2024-10-11

## 2024-10-14 ENCOUNTER — OFFICE VISIT (OUTPATIENT)
Age: 69
End: 2024-10-14

## 2024-10-14 VITALS
SYSTOLIC BLOOD PRESSURE: 99 MMHG | OXYGEN SATURATION: 97 % | DIASTOLIC BLOOD PRESSURE: 65 MMHG | BODY MASS INDEX: 28.89 KG/M2 | WEIGHT: 157 LBS | HEART RATE: 67 BPM | HEIGHT: 62 IN

## 2024-10-14 DIAGNOSIS — I48.4 ATYPICAL ATRIAL FLUTTER (HCC): Primary | ICD-10-CM

## 2024-10-14 DIAGNOSIS — G47.33 OSA (OBSTRUCTIVE SLEEP APNEA): ICD-10-CM

## 2024-10-14 DIAGNOSIS — I42.9 CARDIOMYOPATHY, UNSPECIFIED TYPE (HCC): ICD-10-CM

## 2024-10-14 DIAGNOSIS — Z95.810 AICD (AUTOMATIC CARDIOVERTER/DEFIBRILLATOR) PRESENT: ICD-10-CM

## 2024-10-14 RX ORDER — DESVENLAFAXINE 50 MG/1
50 TABLET, FILM COATED, EXTENDED RELEASE ORAL DAILY
COMMUNITY
Start: 2024-10-09

## 2024-10-14 RX ORDER — MOMETASONE FUROATE AND FORMOTEROL FUMARATE DIHYDRATE 200; 5 UG/1; UG/1
2 AEROSOL RESPIRATORY (INHALATION) 2 TIMES DAILY
COMMUNITY

## 2024-10-14 RX ORDER — TOBRAMYCIN 3 MG/ML
SOLUTION/ DROPS OPHTHALMIC
COMMUNITY
Start: 2024-09-20

## 2024-10-14 RX ORDER — ACETAMINOPHEN 500 MG
1000 TABLET ORAL EVERY 6 HOURS PRN
COMMUNITY

## 2024-10-14 RX ORDER — TROPICAMIDE 10 MG/ML
SOLUTION/ DROPS OPHTHALMIC
COMMUNITY
Start: 2023-11-14

## 2024-10-14 RX ORDER — KETOROLAC TROMETHAMINE 5 MG/ML
SOLUTION OPHTHALMIC
COMMUNITY
Start: 2024-09-20

## 2024-10-14 RX ORDER — BRIMONIDINE TARTRATE 2 MG/ML
SOLUTION/ DROPS OPHTHALMIC
COMMUNITY

## 2024-10-14 NOTE — PROGRESS NOTES
Identified pt with two pt identifiers(name and ). Reviewed record in preparation for visit and have obtained necessary documentation.    Susanna Seymour presents today for   Chief Complaint   Patient presents with    Follow-up     3m       Pt denied DIZZINESS, SOB, CHEST PAIN/ PRESSURE, FATIGUE/WEAKNESS, HEADACHES, SWELLING.             Susanna Seymour preferred language for health care discussion is english/other.    Personal Protective Equipment:   Personal Protective Equipment was used including: mask-surgical and hands-gloves. Patient was placed on no precaution(s). Patient was not masked.    Precautions:   Patient currently on None  Patient currently roomed with door closed.    Is someone accompanying this pt? no    Is the patient using any DME equipment during OV? no    Depression Screenin/12/2024    10:23 AM 2024    11:34 AM 2023     8:55 AM 10/31/2022     1:57 PM 2022    10:28 AM 2022    10:26 AM 10/13/2021     8:39 AM   PHQ-9 Questionaire   Little interest or pleasure in doing things 0 0 0 0 0 0 0   Feeling down, depressed, or hopeless 0 0 0 0 0 0 0   PHQ-9 Total Score 0 0 0 0 0 0 0        Learning Assessment:  completed    Abuse Screening:       No data to display                   Fall Risk      2024    10:23 AM 2024    11:34 AM 2023     8:55 AM   Fall Risk   Do you feel unsteady or are you worried about falling?  no no no   2 or more falls in past year? no no no   Fall with injury in past year? no no no         Pt currently taking Anticoagulant /Antiplatelet therapy? no    Coordination of Care:  1. Have you been to the ER, urgent care clinic since your last visit? Hospitalized since your last visit? no    2. Have you seen or consulted any other health care providers outside of the [facility] since your last visit? Include any pap smears or colon screening. no      Please see Red banners under Allergies and Med Rec to remove outside inquires. All

## 2024-12-13 RX ORDER — CARVEDILOL 6.25 MG/1
6.25 TABLET ORAL 2 TIMES DAILY WITH MEALS
Qty: 60 TABLET | Refills: 2 | Status: SHIPPED | OUTPATIENT
Start: 2024-12-13

## 2024-12-16 RX ORDER — APIXABAN 5 MG/1
5 TABLET, FILM COATED ORAL 2 TIMES DAILY
Qty: 180 TABLET | Refills: 3 | Status: SHIPPED | OUTPATIENT
Start: 2024-12-16

## 2025-01-02 RX ORDER — FUROSEMIDE 40 MG/1
40 TABLET ORAL 2 TIMES DAILY
Qty: 60 TABLET | Refills: 5 | OUTPATIENT
Start: 2025-01-02

## 2025-01-20 ENCOUNTER — HOSPITAL ENCOUNTER (OUTPATIENT)
Dept: WOMENS IMAGING | Facility: HOSPITAL | Age: 70
Discharge: HOME | End: 2025-01-23
Payer: MEDICARE

## 2025-01-20 DIAGNOSIS — Z12.31 ENCOUNTER FOR SCREENING MAMMOGRAM FOR MALIGNANT NEOPLASM OF BREAST: ICD-10-CM

## 2025-01-20 PROCEDURE — 77063 BREAST TOMOSYNTHESIS BI: CPT

## 2025-07-28 RX ORDER — ATORVASTATIN CALCIUM 20 MG/1
20 TABLET, FILM COATED ORAL DAILY
Qty: 90 TABLET | Refills: 3 | Status: SHIPPED | OUTPATIENT
Start: 2025-07-28